# Patient Record
Sex: FEMALE | Race: BLACK OR AFRICAN AMERICAN | Employment: OTHER | URBAN - METROPOLITAN AREA
[De-identification: names, ages, dates, MRNs, and addresses within clinical notes are randomized per-mention and may not be internally consistent; named-entity substitution may affect disease eponyms.]

---

## 2018-01-01 ENCOUNTER — APPOINTMENT (OUTPATIENT)
Dept: GENERAL RADIOLOGY | Age: 83
End: 2018-01-01
Payer: MEDICARE

## 2018-01-01 ENCOUNTER — HOSPITAL ENCOUNTER (OUTPATIENT)
Dept: CT IMAGING | Age: 83
Discharge: HOME | End: 2018-12-21
Payer: MEDICARE

## 2018-01-01 ENCOUNTER — HOSPITAL ENCOUNTER (OUTPATIENT)
Age: 83
Discharge: HOME | End: 2018-12-19
Payer: MEDICARE

## 2018-01-01 ENCOUNTER — OFFICE VISIT (OUTPATIENT)
Dept: HEMATOLOGY | Age: 83
End: 2018-01-01
Payer: MEDICARE

## 2018-01-01 ENCOUNTER — TELEPHONE (OUTPATIENT)
Dept: HEMATOLOGY | Age: 83
End: 2018-01-01

## 2018-01-01 ENCOUNTER — HOSPITAL ENCOUNTER (OUTPATIENT)
Dept: GENERAL RADIOLOGY | Age: 83
Discharge: HOME | End: 2018-12-16
Payer: MEDICARE

## 2018-01-01 ENCOUNTER — HOSPITAL ENCOUNTER (OUTPATIENT)
Age: 83
Discharge: HOME | End: 2018-12-16
Payer: MEDICARE

## 2018-01-01 ENCOUNTER — APPOINTMENT (OUTPATIENT)
Dept: INTERVENTIONAL RADIOLOGY/VASCULAR | Age: 83
End: 2018-01-01
Payer: MEDICARE

## 2018-01-01 ENCOUNTER — HOSPITAL ENCOUNTER (OUTPATIENT)
Age: 83
Setting detail: OBSERVATION
Discharge: HOME | End: 2018-11-24
Attending: EMERGENCY MEDICINE | Admitting: INTERNAL MEDICINE
Payer: MEDICARE

## 2018-01-01 VITALS
BODY MASS INDEX: 39.86 KG/M2 | WEIGHT: 248 LBS | TEMPERATURE: 97.5 F | SYSTOLIC BLOOD PRESSURE: 112 MMHG | HEART RATE: 98 BPM | DIASTOLIC BLOOD PRESSURE: 67 MMHG | HEIGHT: 66 IN | RESPIRATION RATE: 16 BRPM | OXYGEN SATURATION: 95 %

## 2018-01-01 VITALS
OXYGEN SATURATION: 95 % | HEART RATE: 116 BPM | SYSTOLIC BLOOD PRESSURE: 129 MMHG | HEIGHT: 66 IN | DIASTOLIC BLOOD PRESSURE: 84 MMHG | WEIGHT: 244 LBS | RESPIRATION RATE: 20 BRPM | BODY MASS INDEX: 39.21 KG/M2

## 2018-01-01 DIAGNOSIS — J90 PLEURAL EFFUSION: Primary | ICD-10-CM

## 2018-01-01 DIAGNOSIS — K31.89 GASTRIC MASS: ICD-10-CM

## 2018-01-01 DIAGNOSIS — R16.0 LIVER MASS: Primary | ICD-10-CM

## 2018-01-01 DIAGNOSIS — J90 PLEURAL EFFUSION: ICD-10-CM

## 2018-01-01 DIAGNOSIS — R16.0 LIVER MASS: ICD-10-CM

## 2018-01-01 LAB
ALBUMIN SERPL-MCNC: 3.7 G/DL (ref 3.5–5.2)
ALP BLD-CCNC: 88 U/L (ref 35–104)
ALT SERPL-CCNC: 17 U/L (ref 0–32)
AMYLASE FLUID: 24 U/L
ANION GAP SERPL CALCULATED.3IONS-SCNC: 11 MMOL/L (ref 7–16)
ANION GAP SERPL CALCULATED.3IONS-SCNC: 13 MMOL/L (ref 7–16)
ANION GAP SERPL CALCULATED.3IONS-SCNC: 15 MMOL/L (ref 7–16)
APPEARANCE FLUID: NORMAL
APTT: 35.9 SEC (ref 24.5–35.1)
AST SERPL-CCNC: 16 U/L (ref 0–31)
BASOPHILS ABSOLUTE: 0.05 E9/L (ref 0–0.2)
BASOPHILS ABSOLUTE: 0.05 E9/L (ref 0–0.2)
BASOPHILS RELATIVE PERCENT: 0.4 % (ref 0–2)
BASOPHILS RELATIVE PERCENT: 0.5 % (ref 0–2)
BILIRUB SERPL-MCNC: 0.2 MG/DL (ref 0–1.2)
BODY FLUID CULTURE, STERILE: NORMAL
BUN BLDV-MCNC: 13 MG/DL (ref 8–23)
BUN BLDV-MCNC: 14 MG/DL (ref 8–23)
BUN BLDV-MCNC: 16 MG/DL (ref 8–23)
CALCIUM SERPL-MCNC: 9.2 MG/DL (ref 8.6–10.2)
CALCIUM SERPL-MCNC: 9.6 MG/DL (ref 8.6–10.2)
CALCIUM SERPL-MCNC: 9.7 MG/DL (ref 8.6–10.2)
CEA,FLUID: 621.5 NG/ML
CEA: 5.1 NG/ML (ref 0–5.2)
CELL COUNT FLUID TYPE: NORMAL
CHLORIDE BLD-SCNC: 100 MMOL/L (ref 98–107)
CHLORIDE BLD-SCNC: 103 MMOL/L (ref 98–107)
CHLORIDE BLD-SCNC: 95 MMOL/L (ref 98–107)
CO2: 23 MMOL/L (ref 22–29)
CO2: 25 MMOL/L (ref 22–29)
CO2: 25 MMOL/L (ref 22–29)
COLOR FLUID: YELLOW
CREAT SERPL-MCNC: 0.7 MG/DL (ref 0.5–1)
EKG ATRIAL RATE: 115 BPM
EKG ATRIAL RATE: 241 BPM
EKG P AXIS: 54 DEGREES
EKG Q-T INTERVAL: 288 MS
EKG Q-T INTERVAL: 434 MS
EKG QRS DURATION: 82 MS
EKG QRS DURATION: 82 MS
EKG QTC CALCULATION (BAZETT): 412 MS
EKG QTC CALCULATION (BAZETT): 613 MS
EKG R AXIS: 18 DEGREES
EKG R AXIS: 6 DEGREES
EKG T AXIS: -27 DEGREES
EKG T AXIS: -85 DEGREES
EKG VENTRICULAR RATE: 120 BPM
EKG VENTRICULAR RATE: 123 BPM
EOSINOPHILS ABSOLUTE: 0.08 E9/L (ref 0.05–0.5)
EOSINOPHILS ABSOLUTE: 0.19 E9/L (ref 0.05–0.5)
EOSINOPHILS RELATIVE PERCENT: 0.7 % (ref 0–6)
EOSINOPHILS RELATIVE PERCENT: 1.7 % (ref 0–6)
FLUID TYPE: NORMAL
FLUID TYPE: NORMAL
FUNGUS (MYCOLOGY) CULTURE: NORMAL
FUNGUS STAIN: NORMAL
GFR AFRICAN AMERICAN: >60
GFR NON-AFRICAN AMERICAN: >60 ML/MIN/1.73
GLUCOSE BLD-MCNC: 128 MG/DL (ref 74–99)
GLUCOSE BLD-MCNC: 158 MG/DL (ref 74–99)
GLUCOSE BLD-MCNC: 186 MG/DL (ref 74–99)
GLUCOSE, FLUID: 130 MG/DL
GRAM STAIN RESULT: NORMAL
HBA1C MFR BLD: 6.3 % (ref 4–5.6)
HCT VFR BLD CALC: 39.8 % (ref 34–48)
HCT VFR BLD CALC: 41.9 % (ref 34–48)
HEMOGLOBIN: 13.1 G/DL (ref 11.5–15.5)
HEMOGLOBIN: 14 G/DL (ref 11.5–15.5)
IMMATURE GRANULOCYTES #: 0.04 E9/L
IMMATURE GRANULOCYTES #: 0.06 E9/L
IMMATURE GRANULOCYTES %: 0.4 % (ref 0–5)
IMMATURE GRANULOCYTES %: 0.6 % (ref 0–5)
INR BLD: 1.1
LD, FLUID: 163 U/L
LV EF: 60 %
LVEF MODALITY: NORMAL
LYMPHOCYTES ABSOLUTE: 1.92 E9/L (ref 1.5–4)
LYMPHOCYTES ABSOLUTE: 2.19 E9/L (ref 1.5–4)
LYMPHOCYTES RELATIVE PERCENT: 17.6 % (ref 20–42)
LYMPHOCYTES RELATIVE PERCENT: 19.7 % (ref 20–42)
MAGNESIUM: 2.3 MG/DL (ref 1.6–2.6)
MAGNESIUM: 2.5 MG/DL (ref 1.6–2.6)
MCH RBC QN AUTO: 28.1 PG (ref 26–35)
MCH RBC QN AUTO: 28.6 PG (ref 26–35)
MCHC RBC AUTO-ENTMCNC: 32.9 % (ref 32–34.5)
MCHC RBC AUTO-ENTMCNC: 33.4 % (ref 32–34.5)
MCV RBC AUTO: 85.4 FL (ref 80–99.9)
MCV RBC AUTO: 85.5 FL (ref 80–99.9)
METER GLUCOSE: 105 MG/DL (ref 74–99)
METER GLUCOSE: 116 MG/DL (ref 74–99)
METER GLUCOSE: 126 MG/DL (ref 74–99)
METER GLUCOSE: 128 MG/DL (ref 74–99)
METER GLUCOSE: 129 MG/DL (ref 74–99)
METER GLUCOSE: 147 MG/DL (ref 74–99)
METER GLUCOSE: 149 MG/DL (ref 74–99)
MONOCYTE, FLUID: 95 %
MONOCYTES ABSOLUTE: 0.67 E9/L (ref 0.1–0.95)
MONOCYTES ABSOLUTE: 0.8 E9/L (ref 0.1–0.95)
MONOCYTES RELATIVE PERCENT: 6.1 % (ref 2–12)
MONOCYTES RELATIVE PERCENT: 7.2 % (ref 2–12)
NEUTROPHIL, FLUID: 5 %
NEUTROPHILS ABSOLUTE: 7.85 E9/L (ref 1.8–7.3)
NEUTROPHILS ABSOLUTE: 8.12 E9/L (ref 1.8–7.3)
NEUTROPHILS RELATIVE PERCENT: 70.6 % (ref 43–80)
NEUTROPHILS RELATIVE PERCENT: 74.5 % (ref 43–80)
NUCLEATED CELLS FLUID: 2250 /UL
PDW BLD-RTO: 13.5 FL (ref 11.5–15)
PDW BLD-RTO: 13.7 FL (ref 11.5–15)
PLATELET # BLD: 532 E9/L (ref 130–450)
PLATELET # BLD: 545 E9/L (ref 130–450)
PMV BLD AUTO: 8.4 FL (ref 7–12)
PMV BLD AUTO: 8.7 FL (ref 7–12)
POTASSIUM REFLEX MAGNESIUM: 3.5 MMOL/L (ref 3.5–5)
POTASSIUM SERPL-SCNC: 3.9 MMOL/L (ref 3.5–5)
POTASSIUM SERPL-SCNC: 4.4 MMOL/L (ref 3.5–5)
PRO-BNP: 73 PG/ML (ref 0–450)
PROCALCITONIN: 0.06 NG/ML (ref 0–0.08)
PROTEIN FLUID: 4.5 G/DL
PROTHROMBIN TIME: 12.9 SEC (ref 9.3–12.4)
RBC # BLD: 4.66 E12/L (ref 3.5–5.5)
RBC # BLD: 4.9 E12/L (ref 3.5–5.5)
RBC FLUID: <2000 /UL
SODIUM BLD-SCNC: 131 MMOL/L (ref 132–146)
SODIUM BLD-SCNC: 139 MMOL/L (ref 132–146)
SODIUM BLD-SCNC: 140 MMOL/L (ref 132–146)
TOTAL PROTEIN: 7.6 G/DL (ref 6.4–8.3)
TRIGLYCERIDES FLUID: 30 MG/DL
TROPONIN: <0.01 NG/ML (ref 0–0.03)
WBC # BLD: 10.9 E9/L (ref 4.5–11.5)
WBC # BLD: 11.1 E9/L (ref 4.5–11.5)

## 2018-01-01 PROCEDURE — 4004F PT TOBACCO SCREEN RCVD TLK: CPT | Performed by: TRANSPLANT SURGERY

## 2018-01-01 PROCEDURE — 2580000003 HC RX 258: Performed by: INTERNAL MEDICINE

## 2018-01-01 PROCEDURE — 71045 X-RAY EXAM CHEST 1 VIEW: CPT

## 2018-01-01 PROCEDURE — 85610 PROTHROMBIN TIME: CPT

## 2018-01-01 PROCEDURE — 32555 ASPIRATE PLEURA W/ IMAGING: CPT

## 2018-01-01 PROCEDURE — 83615 LACTATE (LD) (LDH) ENZYME: CPT

## 2018-01-01 PROCEDURE — 87205 SMEAR GRAM STAIN: CPT

## 2018-01-01 PROCEDURE — 87102 FUNGUS ISOLATION CULTURE: CPT

## 2018-01-01 PROCEDURE — 82378 CARCINOEMBRYONIC ANTIGEN: CPT

## 2018-01-01 PROCEDURE — G0378 HOSPITAL OBSERVATION PER HR: HCPCS

## 2018-01-01 PROCEDURE — 74175 CTA ABDOMEN W/CONTRAST: CPT

## 2018-01-01 PROCEDURE — 80048 BASIC METABOLIC PNL TOTAL CA: CPT

## 2018-01-01 PROCEDURE — 82962 GLUCOSE BLOOD TEST: CPT

## 2018-01-01 PROCEDURE — 96374 THER/PROPH/DIAG INJ IV PUSH: CPT

## 2018-01-01 PROCEDURE — 83735 ASSAY OF MAGNESIUM: CPT

## 2018-01-01 PROCEDURE — 6360000002 HC RX W HCPCS: Performed by: INTERNAL MEDICINE

## 2018-01-01 PROCEDURE — 6370000000 HC RX 637 (ALT 250 FOR IP): Performed by: INTERNAL MEDICINE

## 2018-01-01 PROCEDURE — 85025 COMPLETE CBC W/AUTO DIFF WBC: CPT

## 2018-01-01 PROCEDURE — 99204 OFFICE O/P NEW MOD 45 MIN: CPT | Performed by: TRANSPLANT SURGERY

## 2018-01-01 PROCEDURE — G8484 FLU IMMUNIZE NO ADMIN: HCPCS | Performed by: TRANSPLANT SURGERY

## 2018-01-01 PROCEDURE — 96372 THER/PROPH/DIAG INJ SC/IM: CPT

## 2018-01-01 PROCEDURE — 1101F PT FALLS ASSESS-DOCD LE1/YR: CPT | Performed by: TRANSPLANT SURGERY

## 2018-01-01 PROCEDURE — 84145 PROCALCITONIN (PCT): CPT

## 2018-01-01 PROCEDURE — 71046 X-RAY EXAM CHEST 2 VIEWS: CPT

## 2018-01-01 PROCEDURE — 87070 CULTURE OTHR SPECIMN AEROBIC: CPT

## 2018-01-01 PROCEDURE — 2580000003 HC RX 258: Performed by: RADIOLOGY

## 2018-01-01 PROCEDURE — 6360000004 HC RX CONTRAST MEDICATION: Performed by: RADIOLOGY

## 2018-01-01 PROCEDURE — 84157 ASSAY OF PROTEIN OTHER: CPT

## 2018-01-01 PROCEDURE — 94761 N-INVAS EAR/PLS OXIMETRY MLT: CPT

## 2018-01-01 PROCEDURE — 84484 ASSAY OF TROPONIN QUANT: CPT

## 2018-01-01 PROCEDURE — 1123F ACP DISCUSS/DSCN MKR DOCD: CPT | Performed by: TRANSPLANT SURGERY

## 2018-01-01 PROCEDURE — 1090F PRES/ABSN URINE INCON ASSESS: CPT | Performed by: TRANSPLANT SURGERY

## 2018-01-01 PROCEDURE — 99285 EMERGENCY DEPT VISIT HI MDM: CPT

## 2018-01-01 PROCEDURE — 85730 THROMBOPLASTIN TIME PARTIAL: CPT

## 2018-01-01 PROCEDURE — 83036 HEMOGLOBIN GLYCOSYLATED A1C: CPT

## 2018-01-01 PROCEDURE — 82150 ASSAY OF AMYLASE: CPT

## 2018-01-01 PROCEDURE — G8427 DOCREV CUR MEDS BY ELIG CLIN: HCPCS | Performed by: TRANSPLANT SURGERY

## 2018-01-01 PROCEDURE — 84478 ASSAY OF TRIGLYCERIDES: CPT

## 2018-01-01 PROCEDURE — 6360000002 HC RX W HCPCS: Performed by: EMERGENCY MEDICINE

## 2018-01-01 PROCEDURE — 36415 COLL VENOUS BLD VENIPUNCTURE: CPT

## 2018-01-01 PROCEDURE — 89051 BODY FLUID CELL COUNT: CPT

## 2018-01-01 PROCEDURE — G8400 PT W/DXA NO RESULTS DOC: HCPCS | Performed by: TRANSPLANT SURGERY

## 2018-01-01 PROCEDURE — C1729 CATH, DRAINAGE: HCPCS

## 2018-01-01 PROCEDURE — 80053 COMPREHEN METABOLIC PANEL: CPT

## 2018-01-01 PROCEDURE — 4040F PNEUMOC VAC/ADMIN/RCVD: CPT | Performed by: TRANSPLANT SURGERY

## 2018-01-01 PROCEDURE — 83880 ASSAY OF NATRIURETIC PEPTIDE: CPT

## 2018-01-01 PROCEDURE — 93306 TTE W/DOPPLER COMPLETE: CPT

## 2018-01-01 PROCEDURE — 82947 ASSAY GLUCOSE BLOOD QUANT: CPT

## 2018-01-01 PROCEDURE — G8417 CALC BMI ABV UP PARAM F/U: HCPCS | Performed by: TRANSPLANT SURGERY

## 2018-01-01 RX ORDER — POLYETHYLENE GLYCOL 3350 17 G/17G
17 POWDER, FOR SOLUTION ORAL ONCE
Status: COMPLETED | OUTPATIENT
Start: 2018-01-01 | End: 2018-01-01

## 2018-01-01 RX ORDER — DEXTROSE MONOHYDRATE 50 MG/ML
100 INJECTION, SOLUTION INTRAVENOUS PRN
Status: DISCONTINUED | OUTPATIENT
Start: 2018-01-01 | End: 2018-01-01 | Stop reason: SDUPTHER

## 2018-01-01 RX ORDER — SODIUM CHLORIDE 0.9 % (FLUSH) 0.9 %
10 SYRINGE (ML) INJECTION EVERY 12 HOURS SCHEDULED
Status: DISCONTINUED | OUTPATIENT
Start: 2018-01-01 | End: 2018-01-01 | Stop reason: HOSPADM

## 2018-01-01 RX ORDER — METOPROLOL SUCCINATE 25 MG/1
25 TABLET, EXTENDED RELEASE ORAL DAILY
Status: DISCONTINUED | OUTPATIENT
Start: 2018-01-01 | End: 2018-01-01 | Stop reason: HOSPADM

## 2018-01-01 RX ORDER — DORZOLAMIDE HCL 20 MG/ML
1 SOLUTION/ DROPS OPHTHALMIC 2 TIMES DAILY
COMMUNITY

## 2018-01-01 RX ORDER — FUROSEMIDE 10 MG/ML
40 INJECTION INTRAMUSCULAR; INTRAVENOUS ONCE
Status: COMPLETED | OUTPATIENT
Start: 2018-01-01 | End: 2018-01-01

## 2018-01-01 RX ORDER — AMLODIPINE BESYLATE 2.5 MG/1
2.5 TABLET ORAL DAILY
Status: DISCONTINUED | OUTPATIENT
Start: 2018-01-01 | End: 2018-01-01 | Stop reason: HOSPADM

## 2018-01-01 RX ORDER — DEXTROSE MONOHYDRATE 25 G/50ML
12.5 INJECTION, SOLUTION INTRAVENOUS PRN
Status: DISCONTINUED | OUTPATIENT
Start: 2018-01-01 | End: 2018-01-01 | Stop reason: HOSPADM

## 2018-01-01 RX ORDER — LATANOPROST 50 UG/ML
1 SOLUTION/ DROPS OPHTHALMIC NIGHTLY
COMMUNITY

## 2018-01-01 RX ORDER — METOPROLOL SUCCINATE 25 MG/1
25 TABLET, EXTENDED RELEASE ORAL DAILY
COMMUNITY

## 2018-01-01 RX ORDER — TRIAMTERENE AND HYDROCHLOROTHIAZIDE 37.5; 25 MG/1; MG/1
1 TABLET ORAL DAILY
Status: ON HOLD | COMMUNITY
End: 2019-01-01 | Stop reason: HOSPADM

## 2018-01-01 RX ORDER — ACETAMINOPHEN 325 MG/1
650 TABLET ORAL EVERY 4 HOURS PRN
Status: DISCONTINUED | OUTPATIENT
Start: 2018-01-01 | End: 2018-01-01 | Stop reason: HOSPADM

## 2018-01-01 RX ORDER — NICOTINE POLACRILEX 4 MG
15 LOZENGE BUCCAL PRN
Status: DISCONTINUED | OUTPATIENT
Start: 2018-01-01 | End: 2018-01-01 | Stop reason: HOSPADM

## 2018-01-01 RX ORDER — DOCUSATE SODIUM 100 MG/1
100 CAPSULE, LIQUID FILLED ORAL DAILY
Status: DISCONTINUED | OUTPATIENT
Start: 2018-01-01 | End: 2018-01-01 | Stop reason: HOSPADM

## 2018-01-01 RX ORDER — DEXTROSE MONOHYDRATE 50 MG/ML
100 INJECTION, SOLUTION INTRAVENOUS PRN
Status: DISCONTINUED | OUTPATIENT
Start: 2018-01-01 | End: 2018-01-01 | Stop reason: HOSPADM

## 2018-01-01 RX ORDER — DORZOLAMIDE HCL 20 MG/ML
1 SOLUTION/ DROPS OPHTHALMIC 2 TIMES DAILY
Status: DISCONTINUED | OUTPATIENT
Start: 2018-01-01 | End: 2018-01-01 | Stop reason: HOSPADM

## 2018-01-01 RX ORDER — SODIUM CHLORIDE 0.9 % (FLUSH) 0.9 %
10 SYRINGE (ML) INJECTION PRN
Status: DISCONTINUED | OUTPATIENT
Start: 2018-01-01 | End: 2018-01-01 | Stop reason: HOSPADM

## 2018-01-01 RX ORDER — PREDNISONE 50 MG/1
TABLET ORAL
Qty: 10 TABLET | Refills: 0 | Status: ON HOLD | OUTPATIENT
Start: 2018-01-01 | End: 2019-01-01 | Stop reason: HOSPADM

## 2018-01-01 RX ORDER — ONDANSETRON 2 MG/ML
4 INJECTION INTRAMUSCULAR; INTRAVENOUS EVERY 6 HOURS PRN
Status: DISCONTINUED | OUTPATIENT
Start: 2018-01-01 | End: 2018-01-01 | Stop reason: HOSPADM

## 2018-01-01 RX ORDER — TRIAMTERENE AND HYDROCHLOROTHIAZIDE 37.5; 25 MG/1; MG/1
1 TABLET ORAL DAILY
Status: DISCONTINUED | OUTPATIENT
Start: 2018-01-01 | End: 2018-01-01 | Stop reason: HOSPADM

## 2018-01-01 RX ORDER — LATANOPROST 50 UG/ML
1 SOLUTION/ DROPS OPHTHALMIC NIGHTLY
Status: DISCONTINUED | OUTPATIENT
Start: 2018-01-01 | End: 2018-01-01 | Stop reason: HOSPADM

## 2018-01-01 RX ORDER — SODIUM CHLORIDE 0.9 % (FLUSH) 0.9 %
10 SYRINGE (ML) INJECTION
Status: COMPLETED | OUTPATIENT
Start: 2018-01-01 | End: 2018-01-01

## 2018-01-01 RX ORDER — AMLODIPINE BESYLATE 2.5 MG/1
2.5 TABLET ORAL DAILY
COMMUNITY
End: 2019-01-01

## 2018-01-01 RX ORDER — NICOTINE POLACRILEX 4 MG
15 LOZENGE BUCCAL PRN
Status: DISCONTINUED | OUTPATIENT
Start: 2018-01-01 | End: 2018-01-01 | Stop reason: SDUPTHER

## 2018-01-01 RX ORDER — CETIRIZINE HYDROCHLORIDE 10 MG/1
5 TABLET ORAL NIGHTLY
Status: DISCONTINUED | OUTPATIENT
Start: 2018-01-01 | End: 2018-01-01 | Stop reason: HOSPADM

## 2018-01-01 RX ORDER — DEXTROSE MONOHYDRATE 25 G/50ML
12.5 INJECTION, SOLUTION INTRAVENOUS PRN
Status: DISCONTINUED | OUTPATIENT
Start: 2018-01-01 | End: 2018-01-01 | Stop reason: SDUPTHER

## 2018-01-01 RX ADMIN — CETIRIZINE HYDROCHLORIDE 5 MG: 10 TABLET, FILM COATED ORAL at 20:03

## 2018-01-01 RX ADMIN — Medication 10 ML: at 08:38

## 2018-01-01 RX ADMIN — LATANOPROST 1 DROP: 50 SOLUTION/ DROPS OPHTHALMIC at 21:21

## 2018-01-01 RX ADMIN — DOCUSATE SODIUM 100 MG: 100 CAPSULE, LIQUID FILLED ORAL at 17:03

## 2018-01-01 RX ADMIN — Medication 10 ML: at 09:11

## 2018-01-01 RX ADMIN — AMLODIPINE BESYLATE 2.5 MG: 2.5 TABLET ORAL at 09:11

## 2018-01-01 RX ADMIN — Medication 10 ML: at 10:31

## 2018-01-01 RX ADMIN — DOCUSATE SODIUM 100 MG: 100 CAPSULE, LIQUID FILLED ORAL at 08:39

## 2018-01-01 RX ADMIN — ENOXAPARIN SODIUM 40 MG: 40 INJECTION SUBCUTANEOUS at 08:39

## 2018-01-01 RX ADMIN — TRIAMTERENE AND HYDROCHLOROTHIAZIDE 1 TABLET: 37.5; 25 TABLET ORAL at 09:11

## 2018-01-01 RX ADMIN — DORZOLAMIDE HYDROCHLORIDE 1 DROP: 20 SOLUTION/ DROPS OPHTHALMIC at 21:21

## 2018-01-01 RX ADMIN — DORZOLAMIDE HYDROCHLORIDE 1 DROP: 20 SOLUTION/ DROPS OPHTHALMIC at 09:11

## 2018-01-01 RX ADMIN — AMLODIPINE BESYLATE 2.5 MG: 2.5 TABLET ORAL at 08:39

## 2018-01-01 RX ADMIN — IOHEXOL 50 ML: 240 INJECTION, SOLUTION INTRATHECAL; INTRAVASCULAR; INTRAVENOUS; ORAL at 10:31

## 2018-01-01 RX ADMIN — LATANOPROST 1 DROP: 50 SOLUTION/ DROPS OPHTHALMIC at 20:03

## 2018-01-01 RX ADMIN — DORZOLAMIDE HYDROCHLORIDE 1 DROP: 20 SOLUTION/ DROPS OPHTHALMIC at 08:40

## 2018-01-01 RX ADMIN — POLYETHYLENE GLYCOL 3350 17 G: 17 POWDER, FOR SOLUTION ORAL at 17:03

## 2018-01-01 RX ADMIN — Medication 10 ML: at 21:21

## 2018-01-01 RX ADMIN — METOPROLOL SUCCINATE 25 MG: 25 TABLET, FILM COATED, EXTENDED RELEASE ORAL at 08:39

## 2018-01-01 RX ADMIN — ENOXAPARIN SODIUM 40 MG: 40 INJECTION SUBCUTANEOUS at 18:25

## 2018-01-01 RX ADMIN — FUROSEMIDE 40 MG: 10 INJECTION, SOLUTION INTRAMUSCULAR; INTRAVENOUS at 13:58

## 2018-01-01 RX ADMIN — TRIAMTERENE AND HYDROCHLOROTHIAZIDE 1 TABLET: 37.5; 25 TABLET ORAL at 08:38

## 2018-01-01 RX ADMIN — METOPROLOL SUCCINATE 25 MG: 25 TABLET, FILM COATED, EXTENDED RELEASE ORAL at 09:11

## 2018-01-01 RX ADMIN — DORZOLAMIDE HYDROCHLORIDE 1 DROP: 20 SOLUTION/ DROPS OPHTHALMIC at 20:03

## 2018-01-01 RX ADMIN — Medication 10 ML: at 20:31

## 2018-01-01 RX ADMIN — IOPAMIDOL 90 ML: 755 INJECTION, SOLUTION INTRAVENOUS at 10:31

## 2018-01-01 ASSESSMENT — ENCOUNTER SYMPTOMS
PHOTOPHOBIA: 0
CONSTIPATION: 0
EYE PAIN: 0
EYE DISCHARGE: 0
NAUSEA: 0
BACK PAIN: 0
VOMITING: 0
ABDOMINAL PAIN: 1
DIARRHEA: 0
BLOOD IN STOOL: 0
SHORTNESS OF BREATH: 0

## 2018-01-01 ASSESSMENT — PAIN SCALES - GENERAL
PAINLEVEL_OUTOF10: 0

## 2018-11-22 PROBLEM — J90 PLEURAL EFFUSION: Status: ACTIVE | Noted: 2018-01-01

## 2018-11-22 NOTE — ED PROVIDER NOTES
HPI:  11/22/18,   Time: 2:40 PM       Akua Ba is a 80 y.o. female presenting to the ED for shortness of breath. Patient reassured of breath for the past week. It is worth exertion when she lies flat. She's never had this before. Denies any associated pain. Denies any associated fever, sputum, chest pain, change in bowel or bladder, rash, nausea, vomiting, or any other symptoms. Review of Systems:   Pertinent positives and negatives are stated within HPI, all other systems reviewed and are negative.          --------------------------------------------- PAST HISTORY ---------------------------------------------  Past Medical History:  has a past medical history of Arthritis. Past Surgical History:  has a past surgical history that includes joint replacement. Social History:  reports that she has been smoking Cigarettes. She has been smoking about 1.00 pack per day. She does not have any smokeless tobacco history on file. She reports that she does not drink alcohol or use drugs. Family History: family history is not on file. The patients home medications have been reviewed. Allergies: Iv [iodides]        ---------------------------------------------------PHYSICAL EXAM--------------------------------------    Constitutional/General: Alert and oriented x3, well appearing, non toxic in NAD  Head: Normocephalic and atraumatic  Eyes: PERRL, EOMI, conjunctive normal, sclera non icteric  Mouth: Oropharynx clear, handling secretions, no trismus, no asymmetry of the posterior oropharynx or uvular edema  Neck: Supple, full ROM, non tender to palpation in the midline, no stridor, no crepitus, no meningeal signs  Respiratory: Diminished at the left, coarse on the right. Not in respiratory distress  Cardiovascular:  Regular tachycardia. Regular rhythm. No murmurs, gallops, or rubs. 2+ distal pulses  Chest: No chest wall tenderness  GI:  Abdomen Soft, Non tender, Non distended. +BS.  No organomegaly, no

## 2018-11-23 PROBLEM — I10 HTN (HYPERTENSION), BENIGN: Chronic | Status: ACTIVE | Noted: 2018-01-01

## 2018-11-23 PROBLEM — I49.3 PVC (PREMATURE VENTRICULAR CONTRACTION): Status: ACTIVE | Noted: 2018-01-01

## 2018-11-23 PROBLEM — R06.00 DYSPNEA: Status: ACTIVE | Noted: 2018-01-01

## 2018-11-23 PROBLEM — I10 ESSENTIAL HYPERTENSION: Status: ACTIVE | Noted: 2018-01-01

## 2018-11-23 PROBLEM — E66.01 MORBID OBESITY (HCC): Chronic | Status: ACTIVE | Noted: 2018-01-01

## 2018-11-23 PROBLEM — K59.00 CONSTIPATION: Status: ACTIVE | Noted: 2018-01-01

## 2018-11-23 PROBLEM — E11.9 DIABETES MELLITUS TYPE 2, CONTROLLED (HCC): Chronic | Status: ACTIVE | Noted: 2018-01-01

## 2018-11-23 NOTE — PROGRESS NOTES
Patient arrived awake for left thoracentesis, procedure explained by tech and Interventional Radiologist, questions answered, patient expresses understanding and consent signed. Pre procedure routine / checklist completed. Left post pleural area scanned, prepped and centesis catheter inserted left side with ultrasound guidance by Dr Quiuqe Marin  @ 21 [phone]. Patient tolerated well. 1.9 Liters drained of green/yellow colored pleural fluid. Centesis catheter removed @ 1903. Puncture site cleansed and dry dressing applied. No bleeding, swelling or complications noted. Chest x-ray post procedure completed, report called to C/Jessica Mchugh [id number] on floor @ 1301.

## 2018-11-23 NOTE — CARE COORDINATION
11/23/2018 social work transition of care  Pt was out of the room but dtr,Fanny ([phone]) was present. Patient is from home with dtr [facility] and had been independent,per dtr. Pt has hx of snf-[place] and [facility]-unable to remember provider. Patient pcp is Dr. Jose Leonard and pharmacy is [facility] on [address] [place] [address]. Explained sw role in transition of care,pt plan is home,family will transport. Sw will follow and assist prn.   Electronically signed by DICK Brown on 11/23/2018 at 1:05 PM

## 2018-11-23 NOTE — H&P
[address] [facility]  History and Physical      CHIEF COMPLAINT:  Short of breath    History of Present Illness: the patient is a 80 y.o.  [place] woman followed by DR Nai Garcia who presents secondary to shortness of breath. She reports feeling short of breath for several weeks. She initially had problems with constipation which she was attributing to use of metformin. She was using stool softeners with modest improvement. She then began noting increased dyspnea with exertion. No associated fevers or chills, but she has noted a sore throat. No nausea or vomiting. No chest pain. No new leg edema. She presented to the ER. Work up with imaging demonstrated a large left pleural effusion. She denies any past history of CHF. Her BNP was normal.  She was admitted for further care. Thoracentesis was completed a short time ago with success. She currently feels better and has no current distress or dyspnea. Past Medical History:   Diagnosis Date    Arthritis     Diabetes mellitus type 2, controlled   11/23/2018    HTN (hypertension), benign 11/23/2018         Past Surgical History:   Procedure Laterality Date    JOINT REPLACEMENT         Medications Prior to Admission:    Prescriptions Prior to Admission: amLODIPine (NORVASC) 2.5 MG tablet, Take 2.5 mg by mouth daily  metoprolol succinate (TOPROL XL) 25 MG extended release tablet, Take 25 mg by mouth daily  triamterene-hydrochlorothiazide (MAXZIDE-25) 37.5-25 MG per tablet, Take 1 tablet by mouth daily  metFORMIN (GLUCOPHAGE) 500 MG tablet, Take 500 mg by mouth daily (with breakfast)  latanoprost (XALATAN) 0.005 % ophthalmic solution, Place 1 drop into both eyes nightly  dorzolamide (TRUSOPT) 2 % ophthalmic solution, Place 1 drop into both eyes 2 times daily    Note that the patient's home medications were reviewed and the above list is accurate to the best of my knowledge at the time of the exam.    Allergies:     Iv [iodides]    Social History:    reports that she has been smoking Cigarettes. She has been smoking about 1.00 pack per day. She does not have any smokeless tobacco history on file. She reports that she does not drink alcohol or use drugs. Family History:   History is noncontributory due to advanced age and co-morbidities. REVIEW OF SYSTEMS:  As above in the HPI, otherwise negative    PHYSICAL EXAM:    Vitals:  /62   Pulse 94   Temp 97.7 °F (36.5 °C) (Oral)   Resp 16   Ht 5' 6\" (1.676 m)   Wt 249 lb 12.8 oz (113.3 kg)   SpO2 97%   BMI 40.32 kg/m²     General:  Awake, alert, oriented X 3. Well developed, well nourished, well groomed. No apparent distress. HEENT:  Normocephalic, atraumatic. No scleral icterus. No conjunctival injection. Normal lips, teeth, and gums. No nasal discharge. No pharyngeal erythema or exudate. Neck:  Supple  Heart:  RRR, no murmurs, gallops, or rubs  Lungs:  CTA bilaterally, bilat symmetrical expansion, no wheeze, rales, or rhonchi  Abdomen:   Bowel sounds present, soft, non distended, nontender, no masses, no organomegaly, no peritoneal signs  Extremities:  No clubbing, cyanosis, or edema  Skin:  Warm and dry, no open lesions or rash  Neuro:  Cranial nerves 2-12 intact, no focal deficits  Breast: deferred  Rectal: deferred  Genitalia:  deferred    LABS:  CBC:   Lab Results   Component Value Date    WBC 11.1 11/23/2018    RBC 4.66 11/23/2018    HGB 13.1 11/23/2018    HCT 39.8 11/23/2018    MCV 85.4 11/23/2018    MCH 28.1 11/23/2018    MCHC 32.9 11/23/2018    RDW 13.7 11/23/2018     11/23/2018    MPV 8.7 11/23/2018     BMP:    Lab Results   Component Value Date     11/23/2018    K 3.5 11/23/2018    CL 95 11/23/2018    CO2 25 11/23/2018    BUN 14 11/23/2018    LABALBU 3.7 11/22/2018    CREATININE 0.7 11/23/2018    CALCIUM 9.2 11/23/2018    GFRAA >60 11/23/2018    LABGLOM >60 11/23/2018    GLUCOSE 128 11/23/2018         ASSESSMENT:      Patient Active Problem List   Diagnosis    Pleural effusion-?etiology, improved post thoracentesis    HTN (hypertension), benign    Diabetes mellitus type 2, controlled      Constipation    Morbid obesity        PLAN:    1) admit to monitored bed observation  2) pulmonary consult (done)  3) await pleural fluid studies  4) check echo  5) resume home medications  6) stool softeners and laxatives for constipation  7) home once work up complete  8) the patient is expected to stay 2 or more midnights to evaluate and treat her pleural effusion      Please note that over 50 minutes was spent in evaluating the patient, review of records and results, discussion with staff/family, etc.    Anahy Encarnacion MD  2:43 PM  11/23/2018

## 2018-11-23 NOTE — PROGRESS NOTES
Dior Richardson M.D.,[facility]  Tavo Whiteside D.O., F.AMILCAR.OELMO., Zachery Spann M.D. Camryn Dutton M.D., Jeffrey Zuniga M.D. Daily Pulmonary Progress Note    Patient:  Rebekah Ballard 80 y.o. female MRN: [id number]     Date of Service: 11/23/2018      Synopsis     We are following patient for increasing dyspnea and large left pleural effusion    \"CC\" shortness of breath    Code status:  Subjective      Patient was seen and examined today lying in the bed. She states that she feels a little better today and is awaiting thoracentesis. She has a slight productive cough and is on room air. She complains of dyspnea on exertion. Review of Systems:  Constitutional: Denies fever, weight loss, night sweats, and fatigue  Skin: Denies pigmentation, dark lesions, and rashes   HEENT: Denies hearing loss, tinnitus, ear drainage, epistaxis, sore throat, and hoarseness. Cardiovascular: Denies palpitations, chest pain, and chest pressure. Respiratory: Denies cough, dyspnea at rest, hemoptysis, apnea, and choking.   Gastrointestinal: Denies nausea, vomiting, poor appetite, diarrhea, heartburn or reflux  Genitourinary: Denies dysuria, frequency, urgency or hematuria  Musculoskeletal: Denies myalgias, muscle weakness, and bone pain  Neurological: Denies dizziness, vertigo, headache, and focal weakness  Psychological: Denies anxiety and depression  Endocrine: Denies heat intolerance and cold intolerance  Hematopoietic/Lymphatic: Denies bleeding problems and blood transfusions    24-hour events:  No acute overnight events    Objective   Vitals: BP (!) 142/80   Pulse 87   Temp 97.2 °F (36.2 °C) (Temporal)   Resp 17   Ht 5' 6\" (1.676 m)   Wt 249 lb 12.8 oz (113.3 kg)   SpO2 94%   BMI 40.32 kg/m²     I/O:    Intake/Output Summary (Last 24 hours) at 11/23/18 0942  Last data filed at 11/23/18 0640   Gross per 24 hour   Intake              480 ml   Output              700 ml   Net             -220 ml results for input(s): CULTRESP in the last 72 hours. No results for input(s): LABGRAM in the last 72 hours. No results for input(s): LEGUR in the last 72 hours. No results for input(s): STREPNEUMAGU in the last 72 hours. No results for input(s): LP1UAG in the last 72 hours. Assessment:    1. Increasing dyspnea. 2.  Large left pleural effusion, cannot rule out underlying mass. 3.  History of breast cancer. Plan:   1. Thoracentesis today, follow fluids  2. CT chest without contrast after thoracentesis  3. procalcitonin pending  4. Echo pending      This plan of care was reviewed in collaboration with    Electronically signed by LIZBETH Thompson CNP on 11/23/2018 at 9:42 AM    I personally saw, examined, and cared for the patient. Labs, medications, radiographs reviewed. I agree with history exam and plans detailed in NP note with the following additions: For L thoracentesis today. Possible chest CT thereafter.   Pleural fluid analysis    Electronically signed by Tyler Arnold MD on 11/23/2018 at 2:40 PM

## 2018-11-23 NOTE — PLAN OF CARE
Problem: Serum Glucose Level - Abnormal:  Goal: Ability to maintain appropriate glucose levels will improve  Ability to maintain appropriate glucose levels will improve   Outcome: Ongoing      Problem: Gas Exchange - Impaired:  Goal: Levels of oxygenation will improve  Levels of oxygenation will improve   Outcome: Met This Shift      Problem: Breathing Pattern - Ineffective:  Goal: Ability to achieve and maintain a regular respiratory rate will improve  Ability to achieve and maintain a regular respiratory rate will improve  Outcome: Met This Shift

## 2018-11-24 NOTE — PROGRESS NOTES
CARDIOLOGY PROGRESS NOTE  [facility]        Subjective:  [address] cardiology seeing patient for shortness of breath, dyspnea on exertion, hypertension, diabetes on metformin 500 mg daily at home. Status post left thoracentesis and breathing better by her report. She is a long-term smoker and analysis pending. Eating lunch in chair at bedside this afternoon and appears to be comfortable in no distress. She denies any hemoptysis, chest pain or exertional chest symptoms. She has ambulated in the room without significant difficulty or shortness of breath by her report. Objective: Labs chart medications and telemetry all reviewed. Patient Vitals for the past 24 hrs:   BP Temp Temp src Pulse Resp SpO2 Weight   11/24/18 0758 112/67 97.5 °F (36.4 °C) Temporal 98 16 95 % -   11/24/18 0630 - - - - - - 248 lb (112.5 kg)   11/23/18 2315 (!) 120/58 97.2 °F (36.2 °C) Temporal 90 16 - -   11/23/18 1945 122/62 96.8 °F (36 °C) Temporal 86 16 - -   11/23/18 1500 (!) 114/59 96.5 °F (35.8 °C) Temporal 92 - 95 % -         Intake/Output Summary (Last 24 hours) at 11/24/18 1324  Last data filed at 11/24/18 1044   Gross per 24 hour   Intake              720 ml   Output              800 ml   Net              -80 ml       Wt Readings from Last 3 Encounters:   11/24/18 248 lb (112.5 kg)       Telemetry:  I  personally reviewed and shows normal sinus rhythm heart rate of 90.     Current meds: Scheduled Meds:   insulin lispro  0-6 Units Subcutaneous TID     insulin lispro  0-3 Units Subcutaneous Nightly    cetirizine  5 mg Oral Nightly    docusate sodium  100 mg Oral Daily    amLODIPine  2.5 mg Oral Daily    dorzolamide  1 drop Both Eyes BID    latanoprost  1 drop Both Eyes Nightly    metoprolol succinate  25 mg Oral Daily    triamterene-hydrochlorothiazide  1 tablet Oral Daily    sodium chloride flush  10 mL Intravenous 2 times per day    enoxaparin  40 mg Subcutaneous Daily     Continuous Infusions:  

## 2018-12-17 NOTE — PROGRESS NOTES
use No    Drug use: No    Sexual activity: Not on file     Other Topics Concern    Not on file     Social History Narrative    No narrative on file       ROS:   Review of Systems   Constitutional: Negative for chills, diaphoresis and fever. HENT: Negative for congestion, ear discharge, ear pain, hearing loss, nosebleeds and tinnitus. Eyes: Negative for photophobia, pain and discharge. Respiratory: Negative for shortness of breath. Cardiovascular: Negative for palpitations and leg swelling. Gastrointestinal: Positive for abdominal pain. Negative for blood in stool, constipation, diarrhea, nausea and vomiting. Endocrine: Negative for polydipsia. Genitourinary: Negative for frequency, hematuria and urgency. Musculoskeletal: Negative for back pain and neck pain. Skin: Negative for rash. Allergic/Immunologic: Negative for environmental allergies. Neurological: Negative for tremors and seizures. Psychiatric/Behavioral: Negative for hallucinations and suicidal ideas. The patient is not nervous/anxious. Physical Exam:  Vitals:    12/14/18 1151   BP: 129/84   Pulse: 116   Resp: 20   SpO2: 95%       PSYCH: mood and affect normal, alert and oriented x 3  CONSTITUTIONAL: No apparent distress, comfortable  EYES: Sclera white, pupils equal round and reactive to light  ENMT:  Hearing normal, trachea midline, ears externally intact  LYMPH: no lympadenopathy in neck. Nolympadenopathy in groins  RESP: Breath sounds were clear and equal with no rales, wheezes, or rhonchi. Respiratory effort was normal with no retractions or use of accessory muscles. CV: Heart soundswere normal with a regular rate and rhythm. No pedal edema  GI/ Abdomen: soft, nondistended, nontender,no guarding, no peritoneal signs.     MSK: no clubbing/ no cyanosis/ gaitnormal       Assessment/Plan:  9cm Gastric mass - possible liver mass  - we discussed her previous surgery's along with there previous CT scan  - will plan for a triphasic CT scan with oral contrast and a steroid prep  - will plan for an EGD  - I am wondering if the mass we are seeing is her previous VBG - she may require an Endoscopic ultrasound  - she is in agreement with this plan    45 Minutes of which greater than 50% was spent counseling or coordinating her care. Thank you for the consultation allowing me to take part in Ms. Shelby' care. Please send a copy of my note to Dr. Frankie Solomon.  Rosangela Alaniz M.D.  12/17/2018  11:06 AM

## 2019-01-01 ENCOUNTER — ANESTHESIA (OUTPATIENT)
Dept: OPERATING ROOM | Age: 84
DRG: 982 | End: 2019-01-01
Payer: MEDICARE

## 2019-01-01 ENCOUNTER — HOSPITAL ENCOUNTER (OUTPATIENT)
Dept: PREADMISSION TESTING | Age: 84
Discharge: HOME | End: 2019-02-05
Payer: MEDICARE

## 2019-01-01 ENCOUNTER — APPOINTMENT (OUTPATIENT)
Dept: GENERAL RADIOLOGY | Age: 84
DRG: 982 | End: 2019-01-01
Attending: TRANSPLANT SURGERY
Payer: MEDICARE

## 2019-01-01 ENCOUNTER — OFFICE VISIT (OUTPATIENT)
Dept: HEMATOLOGY | Age: 84
End: 2019-01-01
Payer: MEDICARE

## 2019-01-01 ENCOUNTER — HOSPITAL ENCOUNTER (INPATIENT)
Age: 84
LOS: 3 days | Discharge: HOSPICE | DRG: 180 | End: 2019-06-01
Attending: EMERGENCY MEDICINE | Admitting: INTERNAL MEDICINE
Payer: MEDICARE

## 2019-01-01 ENCOUNTER — APPOINTMENT (OUTPATIENT)
Dept: GENERAL RADIOLOGY | Age: 84
DRG: 193 | End: 2019-01-01
Payer: MEDICARE

## 2019-01-01 ENCOUNTER — HOSPITAL ENCOUNTER (OUTPATIENT)
Age: 84
Setting detail: OUTPATIENT SURGERY
Discharge: HOME | End: 2019-01-21
Attending: TRANSPLANT SURGERY | Admitting: TRANSPLANT SURGERY
Payer: MEDICARE

## 2019-01-01 ENCOUNTER — TELEPHONE (OUTPATIENT)
Dept: HEMATOLOGY | Age: 84
End: 2019-01-01

## 2019-01-01 ENCOUNTER — ANESTHESIA EVENT (OUTPATIENT)
Dept: ENDOSCOPY | Age: 84
End: 2019-01-01
Payer: MEDICARE

## 2019-01-01 ENCOUNTER — APPOINTMENT (OUTPATIENT)
Dept: GENERAL RADIOLOGY | Age: 84
DRG: 180 | End: 2019-01-01
Payer: MEDICARE

## 2019-01-01 ENCOUNTER — HOSPITAL ENCOUNTER (OUTPATIENT)
Dept: CT IMAGING | Age: 84
Discharge: HOME | End: 2019-03-17
Payer: MEDICARE

## 2019-01-01 ENCOUNTER — HOSPITAL ENCOUNTER (INPATIENT)
Age: 84
LOS: 3 days | Discharge: HOME | DRG: 193 | End: 2019-05-24
Attending: EMERGENCY MEDICINE | Admitting: INTERNAL MEDICINE
Payer: MEDICARE

## 2019-01-01 ENCOUNTER — ANESTHESIA (OUTPATIENT)
Dept: ENDOSCOPY | Age: 84
End: 2019-01-01
Payer: MEDICARE

## 2019-01-01 ENCOUNTER — ANESTHESIA EVENT (OUTPATIENT)
Dept: OPERATING ROOM | Age: 84
DRG: 982 | End: 2019-01-01
Payer: MEDICARE

## 2019-01-01 ENCOUNTER — APPOINTMENT (OUTPATIENT)
Dept: GENERAL RADIOLOGY | Age: 84
End: 2019-01-01
Payer: MEDICARE

## 2019-01-01 ENCOUNTER — HOSPITAL ENCOUNTER (EMERGENCY)
Age: 84
Discharge: HOME | End: 2019-03-29
Attending: EMERGENCY MEDICINE
Payer: MEDICARE

## 2019-01-01 ENCOUNTER — APPOINTMENT (OUTPATIENT)
Dept: CT IMAGING | Age: 84
DRG: 180 | End: 2019-01-01
Payer: MEDICARE

## 2019-01-01 ENCOUNTER — HOSPITAL ENCOUNTER (INPATIENT)
Age: 84
LOS: 8 days | Discharge: HOME HEALTH | DRG: 982 | End: 2019-02-20
Attending: TRANSPLANT SURGERY | Admitting: TRANSPLANT SURGERY
Payer: MEDICARE

## 2019-01-01 ENCOUNTER — APPOINTMENT (OUTPATIENT)
Dept: CT IMAGING | Age: 84
DRG: 982 | End: 2019-01-01
Attending: TRANSPLANT SURGERY
Payer: MEDICARE

## 2019-01-01 ENCOUNTER — APPOINTMENT (OUTPATIENT)
Dept: INTERVENTIONAL RADIOLOGY/VASCULAR | Age: 84
DRG: 982 | End: 2019-01-01
Attending: TRANSPLANT SURGERY
Payer: MEDICARE

## 2019-01-01 VITALS
BODY MASS INDEX: 39.05 KG/M2 | HEIGHT: 66 IN | RESPIRATION RATE: 20 BRPM | SYSTOLIC BLOOD PRESSURE: 125 MMHG | OXYGEN SATURATION: 95 % | DIASTOLIC BLOOD PRESSURE: 81 MMHG | WEIGHT: 243 LBS | HEART RATE: 115 BPM

## 2019-01-01 VITALS
OXYGEN SATURATION: 99 % | WEIGHT: 238.2 LBS | DIASTOLIC BLOOD PRESSURE: 56 MMHG | HEART RATE: 88 BPM | RESPIRATION RATE: 22 BRPM | TEMPERATURE: 97.2 F | HEIGHT: 66 IN | SYSTOLIC BLOOD PRESSURE: 122 MMHG | BODY MASS INDEX: 38.28 KG/M2

## 2019-01-01 VITALS
WEIGHT: 243 LBS | OXYGEN SATURATION: 96 % | BODY MASS INDEX: 39.05 KG/M2 | TEMPERATURE: 96.8 F | RESPIRATION RATE: 16 BRPM | DIASTOLIC BLOOD PRESSURE: 58 MMHG | HEIGHT: 66 IN | HEART RATE: 83 BPM | SYSTOLIC BLOOD PRESSURE: 112 MMHG

## 2019-01-01 VITALS
DIASTOLIC BLOOD PRESSURE: 79 MMHG | BODY MASS INDEX: 39.05 KG/M2 | RESPIRATION RATE: 18 BRPM | HEIGHT: 66 IN | WEIGHT: 243 LBS | HEART RATE: 96 BPM | SYSTOLIC BLOOD PRESSURE: 133 MMHG

## 2019-01-01 VITALS
BODY MASS INDEX: 37.59 KG/M2 | SYSTOLIC BLOOD PRESSURE: 138 MMHG | HEART RATE: 98 BPM | DIASTOLIC BLOOD PRESSURE: 75 MMHG | HEIGHT: 66 IN | TEMPERATURE: 97.3 F | OXYGEN SATURATION: 96 % | WEIGHT: 233.9 LBS

## 2019-01-01 VITALS
SYSTOLIC BLOOD PRESSURE: 109 MMHG | WEIGHT: 225.2 LBS | BODY MASS INDEX: 38.45 KG/M2 | TEMPERATURE: 98.4 F | RESPIRATION RATE: 18 BRPM | DIASTOLIC BLOOD PRESSURE: 61 MMHG | HEART RATE: 100 BPM | HEIGHT: 64 IN | OXYGEN SATURATION: 95 %

## 2019-01-01 VITALS
WEIGHT: 230 LBS | TEMPERATURE: 97.6 F | HEIGHT: 63 IN | HEART RATE: 88 BPM | BODY MASS INDEX: 40.75 KG/M2 | SYSTOLIC BLOOD PRESSURE: 133 MMHG | OXYGEN SATURATION: 95 % | RESPIRATION RATE: 18 BRPM | DIASTOLIC BLOOD PRESSURE: 69 MMHG

## 2019-01-01 VITALS
DIASTOLIC BLOOD PRESSURE: 74 MMHG | TEMPERATURE: 97.5 F | HEIGHT: 60 IN | BODY MASS INDEX: 45.57 KG/M2 | WEIGHT: 232.1 LBS | HEART RATE: 89 BPM | SYSTOLIC BLOOD PRESSURE: 120 MMHG | OXYGEN SATURATION: 100 %

## 2019-01-01 VITALS — SYSTOLIC BLOOD PRESSURE: 100 MMHG | OXYGEN SATURATION: 93 % | DIASTOLIC BLOOD PRESSURE: 58 MMHG

## 2019-01-01 VITALS
OXYGEN SATURATION: 96 % | HEIGHT: 66 IN | HEART RATE: 98 BPM | TEMPERATURE: 98.4 F | BODY MASS INDEX: 37.28 KG/M2 | DIASTOLIC BLOOD PRESSURE: 69 MMHG | WEIGHT: 232 LBS | RESPIRATION RATE: 20 BRPM | SYSTOLIC BLOOD PRESSURE: 133 MMHG

## 2019-01-01 VITALS
WEIGHT: 228 LBS | HEIGHT: 64 IN | SYSTOLIC BLOOD PRESSURE: 123 MMHG | OXYGEN SATURATION: 96 % | RESPIRATION RATE: 18 BRPM | BODY MASS INDEX: 38.93 KG/M2 | DIASTOLIC BLOOD PRESSURE: 75 MMHG | HEART RATE: 93 BPM | TEMPERATURE: 97.7 F

## 2019-01-01 VITALS — TEMPERATURE: 96.1 F | OXYGEN SATURATION: 96 %

## 2019-01-01 DIAGNOSIS — C49.A2 GASTROINTESTINAL STROMAL TUMOR (GIST) OF STOMACH (HCC): Primary | ICD-10-CM

## 2019-01-01 DIAGNOSIS — G89.3 CANCER ASSOCIATED PAIN: ICD-10-CM

## 2019-01-01 DIAGNOSIS — R06.02 SOB (SHORTNESS OF BREATH): ICD-10-CM

## 2019-01-01 DIAGNOSIS — K31.89 MASS OF STOMACH: ICD-10-CM

## 2019-01-01 DIAGNOSIS — Z01.812 PRE-OPERATIVE LABORATORY EXAMINATION: Primary | ICD-10-CM

## 2019-01-01 DIAGNOSIS — C49.A0 GASTROINTESTINAL STROMAL TUMOR (GIST) (HCC): Primary | ICD-10-CM

## 2019-01-01 DIAGNOSIS — J18.9 PNEUMONIA DUE TO ORGANISM: Primary | ICD-10-CM

## 2019-01-01 DIAGNOSIS — G89.18 POST-OP PAIN: ICD-10-CM

## 2019-01-01 DIAGNOSIS — Z51.5 PALLIATIVE CARE ENCOUNTER: ICD-10-CM

## 2019-01-01 DIAGNOSIS — C79.9 METASTATIC DISEASE (HCC): ICD-10-CM

## 2019-01-01 DIAGNOSIS — K31.89 GASTRIC MASS: ICD-10-CM

## 2019-01-01 DIAGNOSIS — R10.12 LEFT UPPER QUADRANT ABDOMINAL PAIN OF UNKNOWN ETIOLOGY: ICD-10-CM

## 2019-01-01 DIAGNOSIS — Z01.812 PRE-OPERATIVE LABORATORY EXAMINATION: ICD-10-CM

## 2019-01-01 DIAGNOSIS — C34.10 MALIGNANT NEOPLASM OF UPPER LOBE OF LUNG, UNSPECIFIED LATERALITY (HCC): ICD-10-CM

## 2019-01-01 DIAGNOSIS — I50.9 CONGESTIVE HEART FAILURE, UNSPECIFIED HF CHRONICITY, UNSPECIFIED HEART FAILURE TYPE (HCC): Primary | ICD-10-CM

## 2019-01-01 DIAGNOSIS — J18.9 COMMUNITY ACQUIRED PNEUMONIA OF LEFT LOWER LOBE OF LUNG: ICD-10-CM

## 2019-01-01 LAB
ABO/RH: NORMAL
ALBUMIN SERPL-MCNC: 2.3 G/DL (ref 3.5–5.2)
ALBUMIN SERPL-MCNC: 2.4 G/DL (ref 3.5–5.2)
ALBUMIN SERPL-MCNC: 2.6 G/DL (ref 3.5–5.2)
ALBUMIN SERPL-MCNC: 2.6 G/DL (ref 3.5–5.2)
ALBUMIN SERPL-MCNC: 2.7 G/DL (ref 3.5–5.2)
ALBUMIN SERPL-MCNC: 2.9 G/DL (ref 3.5–5.2)
ALBUMIN SERPL-MCNC: 3 G/DL (ref 3.5–5.2)
ALBUMIN SERPL-MCNC: 3.7 G/DL (ref 3.5–5.2)
ALP BLD-CCNC: 142 U/L (ref 35–104)
ALP BLD-CCNC: 312 U/L (ref 35–104)
ALP BLD-CCNC: 61 U/L (ref 35–104)
ALP BLD-CCNC: 63 U/L (ref 35–104)
ALP BLD-CCNC: 65 U/L (ref 35–104)
ALP BLD-CCNC: 65 U/L (ref 35–104)
ALP BLD-CCNC: 68 U/L (ref 35–104)
ALP BLD-CCNC: 74 U/L (ref 35–104)
ALP BLD-CCNC: 78 U/L (ref 35–104)
ALP BLD-CCNC: 78 U/L (ref 35–104)
ALP BLD-CCNC: 79 U/L (ref 35–104)
ALT SERPL-CCNC: 13 U/L (ref 0–32)
ALT SERPL-CCNC: 14 U/L (ref 0–32)
ALT SERPL-CCNC: 21 U/L (ref 0–32)
ALT SERPL-CCNC: 21 U/L (ref 0–32)
ALT SERPL-CCNC: 8 U/L (ref 0–32)
ALT SERPL-CCNC: 9 U/L (ref 0–32)
AMORPHOUS: ABNORMAL
ANION GAP SERPL CALCULATED.3IONS-SCNC: 10 MMOL/L (ref 7–16)
ANION GAP SERPL CALCULATED.3IONS-SCNC: 10 MMOL/L (ref 7–16)
ANION GAP SERPL CALCULATED.3IONS-SCNC: 11 MMOL/L (ref 7–16)
ANION GAP SERPL CALCULATED.3IONS-SCNC: 12 MMOL/L (ref 7–16)
ANION GAP SERPL CALCULATED.3IONS-SCNC: 14 MMOL/L (ref 7–16)
ANION GAP SERPL CALCULATED.3IONS-SCNC: 14 MMOL/L (ref 7–16)
ANION GAP SERPL CALCULATED.3IONS-SCNC: 15 MMOL/L (ref 7–16)
ANION GAP SERPL CALCULATED.3IONS-SCNC: 7 MMOL/L (ref 7–16)
ANION GAP SERPL CALCULATED.3IONS-SCNC: 7 MMOL/L (ref 7–16)
ANION GAP SERPL CALCULATED.3IONS-SCNC: 9 MMOL/L (ref 7–16)
ANISOCYTOSIS: ABNORMAL
ANTIBODY SCREEN: NORMAL
APTT: 29.2 SEC (ref 24.5–35.1)
APTT: 31.7 SEC (ref 24.5–35.1)
AST SERPL-CCNC: 12 U/L (ref 0–31)
AST SERPL-CCNC: 14 U/L (ref 0–31)
AST SERPL-CCNC: 15 U/L (ref 0–31)
AST SERPL-CCNC: 16 U/L (ref 0–31)
AST SERPL-CCNC: 17 U/L (ref 0–31)
AST SERPL-CCNC: 17 U/L (ref 0–31)
AST SERPL-CCNC: 18 U/L (ref 0–31)
AST SERPL-CCNC: 19 U/L (ref 0–31)
AST SERPL-CCNC: 19 U/L (ref 0–31)
AST SERPL-CCNC: 44 U/L (ref 0–31)
AST SERPL-CCNC: 49 U/L (ref 0–31)
BACTERIA: ABNORMAL /HPF
BASOPHILS ABSOLUTE: 0 E9/L (ref 0–0.2)
BASOPHILS ABSOLUTE: 0.01 E9/L (ref 0–0.2)
BASOPHILS ABSOLUTE: 0.02 E9/L (ref 0–0.2)
BASOPHILS ABSOLUTE: 0.03 E9/L (ref 0–0.2)
BASOPHILS ABSOLUTE: 0.04 E9/L (ref 0–0.2)
BASOPHILS RELATIVE PERCENT: 0 % (ref 0–2)
BASOPHILS RELATIVE PERCENT: 0.1 % (ref 0–2)
BASOPHILS RELATIVE PERCENT: 0.1 % (ref 0–2)
BASOPHILS RELATIVE PERCENT: 0.3 % (ref 0–2)
BASOPHILS RELATIVE PERCENT: 0.3 % (ref 0–2)
BASOPHILS RELATIVE PERCENT: 0.4 % (ref 0–2)
BILIRUB SERPL-MCNC: 0.2 MG/DL (ref 0–1.2)
BILIRUB SERPL-MCNC: 0.3 MG/DL (ref 0–1.2)
BILIRUB SERPL-MCNC: 0.4 MG/DL (ref 0–1.2)
BILIRUB SERPL-MCNC: 0.4 MG/DL (ref 0–1.2)
BILIRUB SERPL-MCNC: 0.5 MG/DL (ref 0–1.2)
BILIRUB SERPL-MCNC: 0.6 MG/DL (ref 0–1.2)
BILIRUB SERPL-MCNC: <0.2 MG/DL (ref 0–1.2)
BILIRUBIN URINE: ABNORMAL
BLOOD BANK DISPENSE STATUS: NORMAL
BLOOD BANK PRODUCT CODE: NORMAL
BLOOD CULTURE, ROUTINE: ABNORMAL
BLOOD CULTURE, ROUTINE: NORMAL
BLOOD, URINE: NEGATIVE
BPU ID: NORMAL
BUN BLDV-MCNC: 13 MG/DL (ref 8–23)
BUN BLDV-MCNC: 15 MG/DL (ref 8–23)
BUN BLDV-MCNC: 16 MG/DL (ref 8–23)
BUN BLDV-MCNC: 16 MG/DL (ref 8–23)
BUN BLDV-MCNC: 18 MG/DL (ref 8–23)
BUN BLDV-MCNC: 20 MG/DL (ref 8–23)
BUN BLDV-MCNC: 21 MG/DL (ref 8–23)
BUN BLDV-MCNC: 22 MG/DL (ref 8–23)
BUN BLDV-MCNC: 23 MG/DL (ref 8–23)
BUN BLDV-MCNC: 25 MG/DL (ref 8–23)
BUN BLDV-MCNC: 25 MG/DL (ref 8–23)
BUN BLDV-MCNC: 27 MG/DL (ref 8–23)
BUN BLDV-MCNC: 27 MG/DL (ref 8–23)
BUN BLDV-MCNC: 29 MG/DL (ref 8–23)
BURR CELLS: ABNORMAL
CALCIUM IONIZED: 1.3 MMOL/L (ref 1.15–1.33)
CALCIUM IONIZED: 1.34 MMOL/L (ref 1.15–1.33)
CALCIUM IONIZED: 1.35 MMOL/L (ref 1.15–1.33)
CALCIUM SERPL-MCNC: 10.4 MG/DL (ref 8.6–10.2)
CALCIUM SERPL-MCNC: 8.7 MG/DL (ref 8.6–10.2)
CALCIUM SERPL-MCNC: 8.7 MG/DL (ref 8.6–10.2)
CALCIUM SERPL-MCNC: 8.9 MG/DL (ref 8.6–10.2)
CALCIUM SERPL-MCNC: 9 MG/DL (ref 8.6–10.2)
CALCIUM SERPL-MCNC: 9 MG/DL (ref 8.6–10.2)
CALCIUM SERPL-MCNC: 9.1 MG/DL (ref 8.6–10.2)
CALCIUM SERPL-MCNC: 9.1 MG/DL (ref 8.6–10.2)
CALCIUM SERPL-MCNC: 9.2 MG/DL (ref 8.6–10.2)
CALCIUM SERPL-MCNC: 9.3 MG/DL (ref 8.6–10.2)
CALCIUM SERPL-MCNC: 9.5 MG/DL (ref 8.6–10.2)
CALCIUM SERPL-MCNC: 9.5 MG/DL (ref 8.6–10.2)
CHLORIDE BLD-SCNC: 102 MMOL/L (ref 98–107)
CHLORIDE BLD-SCNC: 103 MMOL/L (ref 98–107)
CHLORIDE BLD-SCNC: 104 MMOL/L (ref 98–107)
CHLORIDE BLD-SCNC: 105 MMOL/L (ref 98–107)
CHLORIDE BLD-SCNC: 106 MMOL/L (ref 98–107)
CHLORIDE BLD-SCNC: 107 MMOL/L (ref 98–107)
CHLORIDE BLD-SCNC: 108 MMOL/L (ref 98–107)
CHLORIDE BLD-SCNC: 98 MMOL/L (ref 98–107)
CLARITY: CLEAR
CO2: 21 MMOL/L (ref 22–29)
CO2: 22 MMOL/L (ref 22–29)
CO2: 23 MMOL/L (ref 22–29)
CO2: 24 MMOL/L (ref 22–29)
CO2: 24 MMOL/L (ref 22–29)
CO2: 26 MMOL/L (ref 22–29)
CO2: 27 MMOL/L (ref 22–29)
CO2: 28 MMOL/L (ref 22–29)
CO2: 29 MMOL/L (ref 22–29)
CO2: 29 MMOL/L (ref 22–29)
COLOR: YELLOW
CREAT SERPL-MCNC: 0.5 MG/DL (ref 0.5–1)
CREAT SERPL-MCNC: 0.6 MG/DL (ref 0.5–1)
CREAT SERPL-MCNC: 0.7 MG/DL (ref 0.5–1)
CREAT SERPL-MCNC: 0.8 MG/DL (ref 0.5–1)
CREAT SERPL-MCNC: 0.9 MG/DL (ref 0.5–1)
CREAT SERPL-MCNC: 1 MG/DL (ref 0.5–1)
CREAT SERPL-MCNC: 1 MG/DL (ref 0.5–1)
CULTURE, BLOOD 2: NORMAL
CULTURE, RESPIRATORY: NORMAL
DESCRIPTION BLOOD BANK: NORMAL
EKG ATRIAL RATE: 137 BPM
EKG ATRIAL RATE: 96 BPM
EKG ATRIAL RATE: 99 BPM
EKG P AXIS: 36 DEGREES
EKG P AXIS: 38 DEGREES
EKG P AXIS: 43 DEGREES
EKG P-R INTERVAL: 138 MS
EKG P-R INTERVAL: 138 MS
EKG P-R INTERVAL: 156 MS
EKG Q-T INTERVAL: 268 MS
EKG Q-T INTERVAL: 338 MS
EKG Q-T INTERVAL: 382 MS
EKG QRS DURATION: 76 MS
EKG QRS DURATION: 76 MS
EKG QRS DURATION: 80 MS
EKG QTC CALCULATION (BAZETT): 404 MS
EKG QTC CALCULATION (BAZETT): 427 MS
EKG QTC CALCULATION (BAZETT): 490 MS
EKG R AXIS: -5 DEGREES
EKG R AXIS: -6 DEGREES
EKG R AXIS: 4 DEGREES
EKG T AXIS: -42 DEGREES
EKG T AXIS: 137 DEGREES
EKG T AXIS: 33 DEGREES
EKG VENTRICULAR RATE: 137 BPM
EKG VENTRICULAR RATE: 96 BPM
EKG VENTRICULAR RATE: 99 BPM
EOSINOPHILS ABSOLUTE: 0 E9/L (ref 0.05–0.5)
EOSINOPHILS ABSOLUTE: 0 E9/L (ref 0.05–0.5)
EOSINOPHILS ABSOLUTE: 0.02 E9/L (ref 0.05–0.5)
EOSINOPHILS ABSOLUTE: 0.12 E9/L (ref 0.05–0.5)
EOSINOPHILS ABSOLUTE: 0.29 E9/L (ref 0.05–0.5)
EOSINOPHILS ABSOLUTE: 0.46 E9/L (ref 0.05–0.5)
EOSINOPHILS ABSOLUTE: 0.51 E9/L (ref 0.05–0.5)
EOSINOPHILS ABSOLUTE: 0.53 E9/L (ref 0.05–0.5)
EOSINOPHILS ABSOLUTE: 0.58 E9/L (ref 0.05–0.5)
EOSINOPHILS ABSOLUTE: 0.89 E9/L (ref 0.05–0.5)
EOSINOPHILS ABSOLUTE: 0.93 E9/L (ref 0.05–0.5)
EOSINOPHILS RELATIVE PERCENT: 0 % (ref 0–6)
EOSINOPHILS RELATIVE PERCENT: 0 % (ref 0–6)
EOSINOPHILS RELATIVE PERCENT: 0.1 % (ref 0–6)
EOSINOPHILS RELATIVE PERCENT: 1 % (ref 0–6)
EOSINOPHILS RELATIVE PERCENT: 2.6 % (ref 0–6)
EOSINOPHILS RELATIVE PERCENT: 4.3 % (ref 0–6)
EOSINOPHILS RELATIVE PERCENT: 4.6 % (ref 0–6)
EOSINOPHILS RELATIVE PERCENT: 4.7 % (ref 0–6)
EOSINOPHILS RELATIVE PERCENT: 5.2 % (ref 0–6)
EOSINOPHILS RELATIVE PERCENT: 5.3 % (ref 0–6)
EOSINOPHILS RELATIVE PERCENT: 5.6 % (ref 0–6)
EPITHELIAL CELLS, UA: ABNORMAL /HPF
FILM ARRAY ADENOVIRUS: NORMAL
FILM ARRAY BORDETELLA PERTUSSIS: NORMAL
FILM ARRAY CHLAMYDOPHILIA PNEUMONIAE: NORMAL
FILM ARRAY CORONAVIRUS 229E: NORMAL
FILM ARRAY CORONAVIRUS HKU1: NORMAL
FILM ARRAY CORONAVIRUS NL63: NORMAL
FILM ARRAY CORONAVIRUS OC43: NORMAL
FILM ARRAY INFLUENZA A VIRUS 09H1: NORMAL
FILM ARRAY INFLUENZA A VIRUS H1: NORMAL
FILM ARRAY INFLUENZA A VIRUS H3: NORMAL
FILM ARRAY INFLUENZA A VIRUS: NORMAL
FILM ARRAY INFLUENZA B: NORMAL
FILM ARRAY METAPNEUMOVIRUS: NORMAL
FILM ARRAY MYCOPLASMA PNEUMONIAE: NORMAL
FILM ARRAY PARAINFLUENZA VIRUS 1: NORMAL
FILM ARRAY PARAINFLUENZA VIRUS 2: NORMAL
FILM ARRAY PARAINFLUENZA VIRUS 3: NORMAL
FILM ARRAY PARAINFLUENZA VIRUS 4: NORMAL
FILM ARRAY RESPIRATORY SYNCITIAL VIRUS: NORMAL
FILM ARRAY RHINOVIRUS/ENTEROVIRUS: NORMAL
GFR AFRICAN AMERICAN: >60
GFR NON-AFRICAN AMERICAN: >60 ML/MIN/1.73
GLUCOSE BLD-MCNC: 100 MG/DL (ref 74–99)
GLUCOSE BLD-MCNC: 104 MG/DL (ref 74–99)
GLUCOSE BLD-MCNC: 110 MG/DL (ref 74–99)
GLUCOSE BLD-MCNC: 111 MG/DL (ref 74–99)
GLUCOSE BLD-MCNC: 115 MG/DL (ref 74–99)
GLUCOSE BLD-MCNC: 117 MG/DL (ref 74–99)
GLUCOSE BLD-MCNC: 120 MG/DL (ref 74–99)
GLUCOSE BLD-MCNC: 120 MG/DL (ref 74–99)
GLUCOSE BLD-MCNC: 121 MG/DL (ref 74–99)
GLUCOSE BLD-MCNC: 122 MG/DL (ref 74–99)
GLUCOSE BLD-MCNC: 125 MG/DL (ref 74–99)
GLUCOSE BLD-MCNC: 130 MG/DL (ref 74–99)
GLUCOSE BLD-MCNC: 152 MG/DL (ref 74–99)
GLUCOSE BLD-MCNC: 167 MG/DL (ref 74–99)
GLUCOSE URINE: NEGATIVE MG/DL
HCT VFR BLD CALC: 27.7 % (ref 34–48)
HCT VFR BLD CALC: 29.6 % (ref 34–48)
HCT VFR BLD CALC: 30.7 % (ref 34–48)
HCT VFR BLD CALC: 31.2 % (ref 34–48)
HCT VFR BLD CALC: 31.5 % (ref 34–48)
HCT VFR BLD CALC: 31.7 % (ref 34–48)
HCT VFR BLD CALC: 32.3 % (ref 34–48)
HCT VFR BLD CALC: 32.4 % (ref 34–48)
HCT VFR BLD CALC: 33.3 % (ref 34–48)
HCT VFR BLD CALC: 33.7 % (ref 34–48)
HCT VFR BLD CALC: 34.3 % (ref 34–48)
HCT VFR BLD CALC: 35.8 % (ref 34–48)
HCT VFR BLD CALC: 36 % (ref 34–48)
HCT VFR BLD CALC: 38.1 % (ref 34–48)
HEMOGLOBIN: 10 G/DL (ref 11.5–15.5)
HEMOGLOBIN: 10.1 G/DL (ref 11.5–15.5)
HEMOGLOBIN: 10.2 G/DL (ref 11.5–15.5)
HEMOGLOBIN: 10.3 G/DL (ref 11.5–15.5)
HEMOGLOBIN: 10.5 G/DL (ref 11.5–15.5)
HEMOGLOBIN: 10.9 G/DL (ref 11.5–15.5)
HEMOGLOBIN: 11.2 G/DL (ref 11.5–15.5)
HEMOGLOBIN: 11.4 G/DL (ref 11.5–15.5)
HEMOGLOBIN: 11.9 G/DL (ref 11.5–15.5)
HEMOGLOBIN: 12.4 G/DL (ref 11.5–15.5)
HEMOGLOBIN: 8.8 G/DL (ref 11.5–15.5)
HEMOGLOBIN: 9.3 G/DL (ref 11.5–15.5)
HEMOGLOBIN: 9.5 G/DL (ref 11.5–15.5)
HEMOGLOBIN: 9.9 G/DL (ref 11.5–15.5)
HYPOCHROMIA: ABNORMAL
IMMATURE GRANULOCYTES #: 0.04 E9/L
IMMATURE GRANULOCYTES #: 0.05 E9/L
IMMATURE GRANULOCYTES #: 0.07 E9/L
IMMATURE GRANULOCYTES #: 0.1 E9/L
IMMATURE GRANULOCYTES #: 1.1 E9/L
IMMATURE GRANULOCYTES %: 0.4 % (ref 0–5)
IMMATURE GRANULOCYTES %: 0.5 % (ref 0–5)
IMMATURE GRANULOCYTES %: 0.5 % (ref 0–5)
IMMATURE GRANULOCYTES %: 0.6 % (ref 0–5)
IMMATURE GRANULOCYTES %: 0.6 % (ref 0–5)
IMMATURE GRANULOCYTES %: 1.1 % (ref 0–5)
INR BLD: 1.1
INR BLD: 1.3
KETONES, URINE: NEGATIVE MG/DL
L. PNEUMOPHILA SEROGP 1 UR AG: NORMAL
LACTIC ACID, SEPSIS: 2.8 MMOL/L (ref 0.5–1.9)
LACTIC ACID: 1.5 MMOL/L (ref 0.5–2.2)
LEUKOCYTE ESTERASE, URINE: NEGATIVE
LIPASE: 9 U/L (ref 13–60)
LYMPHOCYTES ABSOLUTE: 0 E9/L (ref 1.5–4)
LYMPHOCYTES ABSOLUTE: 0.41 E9/L (ref 1.5–4)
LYMPHOCYTES ABSOLUTE: 1.51 E9/L (ref 1.5–4)
LYMPHOCYTES ABSOLUTE: 1.65 E9/L (ref 1.5–4)
LYMPHOCYTES ABSOLUTE: 1.67 E9/L (ref 1.5–4)
LYMPHOCYTES ABSOLUTE: 1.74 E9/L (ref 1.5–4)
LYMPHOCYTES ABSOLUTE: 1.87 E9/L (ref 1.5–4)
LYMPHOCYTES ABSOLUTE: 2.04 E9/L (ref 1.5–4)
LYMPHOCYTES ABSOLUTE: 2.09 E9/L (ref 1.5–4)
LYMPHOCYTES ABSOLUTE: 2.33 E9/L (ref 1.5–4)
LYMPHOCYTES ABSOLUTE: 2.69 E9/L (ref 1.5–4)
LYMPHOCYTES RELATIVE PERCENT: 0 % (ref 20–42)
LYMPHOCYTES RELATIVE PERCENT: 1.7 % (ref 20–42)
LYMPHOCYTES RELATIVE PERCENT: 11.9 % (ref 20–42)
LYMPHOCYTES RELATIVE PERCENT: 12.9 % (ref 20–42)
LYMPHOCYTES RELATIVE PERCENT: 15 % (ref 20–42)
LYMPHOCYTES RELATIVE PERCENT: 16.7 % (ref 20–42)
LYMPHOCYTES RELATIVE PERCENT: 17 % (ref 20–42)
LYMPHOCYTES RELATIVE PERCENT: 20.4 % (ref 20–42)
LYMPHOCYTES RELATIVE PERCENT: 20.6 % (ref 20–42)
LYMPHOCYTES RELATIVE PERCENT: 23.6 % (ref 20–42)
LYMPHOCYTES RELATIVE PERCENT: 8.9 % (ref 20–42)
MAGNESIUM: 1.9 MG/DL (ref 1.6–2.6)
MAGNESIUM: 2 MG/DL (ref 1.6–2.6)
MAGNESIUM: 2.1 MG/DL (ref 1.6–2.6)
MAGNESIUM: 2.1 MG/DL (ref 1.6–2.6)
MAGNESIUM: 2.2 MG/DL (ref 1.6–2.6)
MAGNESIUM: 2.2 MG/DL (ref 1.6–2.6)
MCH RBC QN AUTO: 26.3 PG (ref 26–35)
MCH RBC QN AUTO: 26.4 PG (ref 26–35)
MCH RBC QN AUTO: 26.7 PG (ref 26–35)
MCH RBC QN AUTO: 26.8 PG (ref 26–35)
MCH RBC QN AUTO: 26.8 PG (ref 26–35)
MCH RBC QN AUTO: 27.2 PG (ref 26–35)
MCH RBC QN AUTO: 27.3 PG (ref 26–35)
MCH RBC QN AUTO: 27.3 PG (ref 26–35)
MCH RBC QN AUTO: 27.4 PG (ref 26–35)
MCH RBC QN AUTO: 27.5 PG (ref 26–35)
MCH RBC QN AUTO: 27.5 PG (ref 26–35)
MCH RBC QN AUTO: 27.6 PG (ref 26–35)
MCHC RBC AUTO-ENTMCNC: 30.9 % (ref 32–34.5)
MCHC RBC AUTO-ENTMCNC: 31.3 % (ref 32–34.5)
MCHC RBC AUTO-ENTMCNC: 31.4 % (ref 32–34.5)
MCHC RBC AUTO-ENTMCNC: 31.4 % (ref 32–34.5)
MCHC RBC AUTO-ENTMCNC: 31.5 % (ref 32–34.5)
MCHC RBC AUTO-ENTMCNC: 31.5 % (ref 32–34.5)
MCHC RBC AUTO-ENTMCNC: 31.8 % (ref 32–34.5)
MCHC RBC AUTO-ENTMCNC: 32.1 % (ref 32–34.5)
MCHC RBC AUTO-ENTMCNC: 32.5 % (ref 32–34.5)
MCHC RBC AUTO-ENTMCNC: 32.5 % (ref 32–34.5)
MCHC RBC AUTO-ENTMCNC: 33.1 % (ref 32–34.5)
MCHC RBC AUTO-ENTMCNC: 33.2 % (ref 32–34.5)
MCV RBC AUTO: 80.6 FL (ref 80–99.9)
MCV RBC AUTO: 80.9 FL (ref 80–99.9)
MCV RBC AUTO: 81.3 FL (ref 80–99.9)
MCV RBC AUTO: 82.9 FL (ref 80–99.9)
MCV RBC AUTO: 84.2 FL (ref 80–99.9)
MCV RBC AUTO: 84.7 FL (ref 80–99.9)
MCV RBC AUTO: 86 FL (ref 80–99.9)
MCV RBC AUTO: 86.3 FL (ref 80–99.9)
MCV RBC AUTO: 86.4 FL (ref 80–99.9)
MCV RBC AUTO: 86.8 FL (ref 80–99.9)
MCV RBC AUTO: 87.1 FL (ref 80–99.9)
MCV RBC AUTO: 87.2 FL (ref 80–99.9)
MCV RBC AUTO: 87.5 FL (ref 80–99.9)
MCV RBC AUTO: 88.2 FL (ref 80–99.9)
METER GLUCOSE: 105 MG/DL (ref 74–99)
METER GLUCOSE: 106 MG/DL (ref 74–99)
METER GLUCOSE: 110 MG/DL (ref 74–99)
METER GLUCOSE: 112 MG/DL (ref 74–99)
METER GLUCOSE: 115 MG/DL (ref 74–99)
METER GLUCOSE: 123 MG/DL (ref 74–99)
METER GLUCOSE: 126 MG/DL (ref 74–99)
METER GLUCOSE: 126 MG/DL (ref 74–99)
METER GLUCOSE: 127 MG/DL (ref 74–99)
METER GLUCOSE: 131 MG/DL (ref 74–99)
METER GLUCOSE: 134 MG/DL (ref 74–99)
METER GLUCOSE: 137 MG/DL (ref 74–99)
METER GLUCOSE: 138 MG/DL (ref 74–99)
METER GLUCOSE: 145 MG/DL (ref 74–99)
METER GLUCOSE: 182 MG/DL (ref 74–99)
MONOCYTES ABSOLUTE: 0 E9/L (ref 0.1–0.95)
MONOCYTES ABSOLUTE: 0.54 E9/L (ref 0.1–0.95)
MONOCYTES ABSOLUTE: 0.74 E9/L (ref 0.1–0.95)
MONOCYTES ABSOLUTE: 0.8 E9/L (ref 0.1–0.95)
MONOCYTES ABSOLUTE: 0.83 E9/L (ref 0.1–0.95)
MONOCYTES ABSOLUTE: 0.83 E9/L (ref 0.1–0.95)
MONOCYTES ABSOLUTE: 0.88 E9/L (ref 0.1–0.95)
MONOCYTES ABSOLUTE: 1.1 E9/L (ref 0.1–0.95)
MONOCYTES ABSOLUTE: 1.23 E9/L (ref 0.1–0.95)
MONOCYTES ABSOLUTE: 1.24 E9/L (ref 0.1–0.95)
MONOCYTES ABSOLUTE: 1.36 E9/L (ref 0.1–0.95)
MONOCYTES RELATIVE PERCENT: 0 % (ref 2–12)
MONOCYTES RELATIVE PERCENT: 3.4 % (ref 2–12)
MONOCYTES RELATIVE PERCENT: 6.1 % (ref 2–12)
MONOCYTES RELATIVE PERCENT: 7.3 % (ref 2–12)
MONOCYTES RELATIVE PERCENT: 7.3 % (ref 2–12)
MONOCYTES RELATIVE PERCENT: 7.5 % (ref 2–12)
MONOCYTES RELATIVE PERCENT: 7.8 % (ref 2–12)
MONOCYTES RELATIVE PERCENT: 7.9 % (ref 2–12)
MONOCYTES RELATIVE PERCENT: 8.6 % (ref 2–12)
MONOCYTES RELATIVE PERCENT: 9.5 % (ref 2–12)
MONOCYTES RELATIVE PERCENT: 9.7 % (ref 2–12)
MRSA CULTURE ONLY: NORMAL
MYELOCYTE PERCENT: 0.9 % (ref 0–0)
MYELOCYTE PERCENT: 0.9 % (ref 0–0)
NEUTROPHILS ABSOLUTE: 0 E9/L (ref 1.8–7.3)
NEUTROPHILS ABSOLUTE: 10.92 E9/L (ref 1.8–7.3)
NEUTROPHILS ABSOLUTE: 14.05 E9/L (ref 1.8–7.3)
NEUTROPHILS ABSOLUTE: 14.14 E9/L (ref 1.8–7.3)
NEUTROPHILS ABSOLUTE: 18.13 E9/L (ref 1.8–7.3)
NEUTROPHILS ABSOLUTE: 6.58 E9/L (ref 1.8–7.3)
NEUTROPHILS ABSOLUTE: 6.62 E9/L (ref 1.8–7.3)
NEUTROPHILS ABSOLUTE: 6.71 E9/L (ref 1.8–7.3)
NEUTROPHILS ABSOLUTE: 7.23 E9/L (ref 1.8–7.3)
NEUTROPHILS ABSOLUTE: 8.03 E9/L (ref 1.8–7.3)
NEUTROPHILS ABSOLUTE: 8.58 E9/L (ref 1.8–7.3)
NEUTROPHILS RELATIVE PERCENT: 0 % (ref 43–80)
NEUTROPHILS RELATIVE PERCENT: 63.4 % (ref 43–80)
NEUTROPHILS RELATIVE PERCENT: 65.3 % (ref 43–80)
NEUTROPHILS RELATIVE PERCENT: 66.4 % (ref 43–80)
NEUTROPHILS RELATIVE PERCENT: 68.4 % (ref 43–80)
NEUTROPHILS RELATIVE PERCENT: 72.8 % (ref 43–80)
NEUTROPHILS RELATIVE PERCENT: 73.8 % (ref 43–80)
NEUTROPHILS RELATIVE PERCENT: 77.6 % (ref 43–80)
NEUTROPHILS RELATIVE PERCENT: 77.8 % (ref 43–80)
NEUTROPHILS RELATIVE PERCENT: 83.1 % (ref 43–80)
NEUTROPHILS RELATIVE PERCENT: 87 % (ref 43–80)
NITRITE, URINE: NEGATIVE
ORGANISM: ABNORMAL
ORGANISM: ABNORMAL
OVALOCYTES: ABNORMAL
OVALOCYTES: ABNORMAL
PDW BLD-RTO: 14.8 FL (ref 11.5–15)
PDW BLD-RTO: 14.9 FL (ref 11.5–15)
PDW BLD-RTO: 15 FL (ref 11.5–15)
PDW BLD-RTO: 15 FL (ref 11.5–15)
PDW BLD-RTO: 15.1 FL (ref 11.5–15)
PDW BLD-RTO: 15.1 FL (ref 11.5–15)
PDW BLD-RTO: 15.2 FL (ref 11.5–15)
PDW BLD-RTO: 15.3 FL (ref 11.5–15)
PDW BLD-RTO: 15.3 FL (ref 11.5–15)
PDW BLD-RTO: 15.5 FL (ref 11.5–15)
PDW BLD-RTO: 15.8 FL (ref 11.5–15)
PDW BLD-RTO: 15.9 FL (ref 11.5–15)
PDW BLD-RTO: 15.9 FL (ref 11.5–15)
PDW BLD-RTO: 16.7 FL (ref 11.5–15)
PH UA: 5.5 (ref 5–9)
PHOSPHORUS: 2.8 MG/DL (ref 2.5–4.5)
PHOSPHORUS: 3 MG/DL (ref 2.5–4.5)
PHOSPHORUS: 3.3 MG/DL (ref 2.5–4.5)
PHOSPHORUS: 3.4 MG/DL (ref 2.5–4.5)
PHOSPHORUS: 3.4 MG/DL (ref 2.5–4.5)
PHOSPHORUS: 3.6 MG/DL (ref 2.5–4.5)
PHOSPHORUS: 3.7 MG/DL (ref 2.5–4.5)
PLATELET # BLD: 353 E9/L (ref 130–450)
PLATELET # BLD: 367 E9/L (ref 130–450)
PLATELET # BLD: 371 E9/L (ref 130–450)
PLATELET # BLD: 386 E9/L (ref 130–450)
PLATELET # BLD: 402 E9/L (ref 130–450)
PLATELET # BLD: 424 E9/L (ref 130–450)
PLATELET # BLD: 425 E9/L (ref 130–450)
PLATELET # BLD: 440 E9/L (ref 130–450)
PLATELET # BLD: 443 E9/L (ref 130–450)
PLATELET # BLD: 449 E9/L (ref 130–450)
PLATELET # BLD: 459 E9/L (ref 130–450)
PLATELET # BLD: 485 E9/L (ref 130–450)
PLATELET # BLD: 486 E9/L (ref 130–450)
PLATELET # BLD: 515 E9/L (ref 130–450)
PMV BLD AUTO: 10.1 FL (ref 7–12)
PMV BLD AUTO: 10.2 FL (ref 7–12)
PMV BLD AUTO: 8.4 FL (ref 7–12)
PMV BLD AUTO: 8.7 FL (ref 7–12)
PMV BLD AUTO: 8.9 FL (ref 7–12)
PMV BLD AUTO: 9.1 FL (ref 7–12)
PMV BLD AUTO: 9.2 FL (ref 7–12)
PMV BLD AUTO: 9.4 FL (ref 7–12)
PMV BLD AUTO: 9.7 FL (ref 7–12)
PMV BLD AUTO: 9.8 FL (ref 7–12)
POIKILOCYTES: ABNORMAL
POLYCHROMASIA: ABNORMAL
POTASSIUM REFLEX MAGNESIUM: 3.7 MMOL/L (ref 3.5–5)
POTASSIUM REFLEX MAGNESIUM: 3.8 MMOL/L (ref 3.5–5)
POTASSIUM REFLEX MAGNESIUM: 3.8 MMOL/L (ref 3.5–5)
POTASSIUM REFLEX MAGNESIUM: 3.9 MMOL/L (ref 3.5–5)
POTASSIUM REFLEX MAGNESIUM: 4.2 MMOL/L (ref 3.5–5)
POTASSIUM REFLEX MAGNESIUM: 4.3 MMOL/L (ref 3.5–5)
POTASSIUM REFLEX MAGNESIUM: 4.6 MMOL/L (ref 3.5–5)
POTASSIUM REFLEX MAGNESIUM: 5 MMOL/L (ref 3.5–5)
POTASSIUM SERPL-SCNC: 3.7 MMOL/L (ref 3.5–5)
POTASSIUM SERPL-SCNC: 4.1 MMOL/L (ref 3.5–5)
POTASSIUM SERPL-SCNC: 4.3 MMOL/L (ref 3.5–5)
POTASSIUM SERPL-SCNC: 4.4 MMOL/L (ref 3.5–5)
PRO-BNP: 174 PG/ML (ref 0–450)
PRO-BNP: 246 PG/ML (ref 0–450)
PRO-BNP: 515 PG/ML (ref 0–450)
PROCALCITONIN: 0.49 NG/ML (ref 0–0.08)
PROCALCITONIN: 1.07 NG/ML (ref 0–0.08)
PROTEIN UA: 30 MG/DL
PROTHROMBIN TIME: 12.2 SEC (ref 9.3–12.4)
PROTHROMBIN TIME: 15 SEC (ref 9.3–12.4)
RBC # BLD: 3.21 E12/L (ref 3.5–5.5)
RBC # BLD: 3.4 E12/L (ref 3.5–5.5)
RBC # BLD: 3.48 E12/L (ref 3.5–5.5)
RBC # BLD: 3.63 E12/L (ref 3.5–5.5)
RBC # BLD: 3.63 E12/L (ref 3.5–5.5)
RBC # BLD: 3.69 E12/L (ref 3.5–5.5)
RBC # BLD: 3.75 E12/L (ref 3.5–5.5)
RBC # BLD: 3.82 E12/L (ref 3.5–5.5)
RBC # BLD: 3.9 E12/L (ref 3.5–5.5)
RBC # BLD: 4.14 E12/L (ref 3.5–5.5)
RBC # BLD: 4.18 E12/L (ref 3.5–5.5)
RBC # BLD: 4.25 E12/L (ref 3.5–5.5)
RBC # BLD: 4.45 E12/L (ref 3.5–5.5)
RBC # BLD: 4.5 E12/L (ref 3.5–5.5)
RBC UA: ABNORMAL /HPF (ref 0–2)
REASON FOR REJECTION: NORMAL
REJECTED TEST: NORMAL
SMEAR, RESPIRATORY: NORMAL
SODIUM BLD-SCNC: 133 MMOL/L (ref 132–146)
SODIUM BLD-SCNC: 138 MMOL/L (ref 132–146)
SODIUM BLD-SCNC: 138 MMOL/L (ref 132–146)
SODIUM BLD-SCNC: 139 MMOL/L (ref 132–146)
SODIUM BLD-SCNC: 139 MMOL/L (ref 132–146)
SODIUM BLD-SCNC: 140 MMOL/L (ref 132–146)
SODIUM BLD-SCNC: 140 MMOL/L (ref 132–146)
SODIUM BLD-SCNC: 141 MMOL/L (ref 132–146)
SODIUM BLD-SCNC: 144 MMOL/L (ref 132–146)
SODIUM BLD-SCNC: 145 MMOL/L (ref 132–146)
SODIUM BLD-SCNC: 145 MMOL/L (ref 132–146)
SPECIFIC GRAVITY UA: 1.02 (ref 1–1.03)
STREP PNEUMONIAE ANTIGEN, URINE: NORMAL
TOTAL PROTEIN: 5.6 G/DL (ref 6.4–8.3)
TOTAL PROTEIN: 5.7 G/DL (ref 6.4–8.3)
TOTAL PROTEIN: 5.7 G/DL (ref 6.4–8.3)
TOTAL PROTEIN: 5.9 G/DL (ref 6.4–8.3)
TOTAL PROTEIN: 6.1 G/DL (ref 6.4–8.3)
TOTAL PROTEIN: 6.2 G/DL (ref 6.4–8.3)
TOTAL PROTEIN: 6.2 G/DL (ref 6.4–8.3)
TOTAL PROTEIN: 6.8 G/DL (ref 6.4–8.3)
TOTAL PROTEIN: 7.1 G/DL (ref 6.4–8.3)
TROPONIN: <0.01 NG/ML (ref 0–0.03)
URINE CULTURE, ROUTINE: NORMAL
UROBILINOGEN, URINE: 1 E.U./DL
VANCOMYCIN RANDOM: 7.9 MCG/ML (ref 5–40)
WBC # BLD: 10.3 E9/L (ref 4.5–11.5)
WBC # BLD: 10.7 E9/L (ref 4.5–11.5)
WBC # BLD: 11 E9/L (ref 4.5–11.5)
WBC # BLD: 11.4 E9/L (ref 4.5–11.5)
WBC # BLD: 11.6 E9/L (ref 4.5–11.5)
WBC # BLD: 14 E9/L (ref 4.5–11.5)
WBC # BLD: 16.3 E9/L (ref 4.5–11.5)
WBC # BLD: 16.9 E9/L (ref 4.5–11.5)
WBC # BLD: 17.7 E9/L (ref 4.5–11.5)
WBC # BLD: 17.9 E9/L (ref 4.5–11.5)
WBC # BLD: 20.6 E9/L (ref 4.5–11.5)
WBC # BLD: 6.7 E9/L (ref 4.5–11.5)
WBC # BLD: 9.7 E9/L (ref 4.5–11.5)
WBC # BLD: 9.9 E9/L (ref 4.5–11.5)
WBC UA: ABNORMAL /HPF (ref 0–5)

## 2019-01-01 PROCEDURE — 2580000003 HC RX 258: Performed by: STUDENT IN AN ORGANIZED HEALTH CARE EDUCATION/TRAINING PROGRAM

## 2019-01-01 PROCEDURE — 82962 GLUCOSE BLOOD TEST: CPT

## 2019-01-01 PROCEDURE — 84100 ASSAY OF PHOSPHORUS: CPT

## 2019-01-01 PROCEDURE — 99285 EMERGENCY DEPT VISIT HI MDM: CPT

## 2019-01-01 PROCEDURE — 7100000010 HC PHASE II RECOVERY - FIRST 15 MIN: Performed by: TRANSPLANT SURGERY

## 2019-01-01 PROCEDURE — 6370000000 HC RX 637 (ALT 250 FOR IP): Performed by: INTERNAL MEDICINE

## 2019-01-01 PROCEDURE — 36415 COLL VENOUS BLD VENIPUNCTURE: CPT

## 2019-01-01 PROCEDURE — 2580000003 HC RX 258: Performed by: ANESTHESIOLOGY

## 2019-01-01 PROCEDURE — G8400 PT W/DXA NO RESULTS DOC: HCPCS | Performed by: TRANSPLANT SURGERY

## 2019-01-01 PROCEDURE — 94669 MECHANICAL CHEST WALL OSCILL: CPT

## 2019-01-01 PROCEDURE — 6360000002 HC RX W HCPCS: Performed by: EMERGENCY MEDICINE

## 2019-01-01 PROCEDURE — 86850 RBC ANTIBODY SCREEN: CPT

## 2019-01-01 PROCEDURE — C9113 INJ PANTOPRAZOLE SODIUM, VIA: HCPCS | Performed by: STUDENT IN AN ORGANIZED HEALTH CARE EDUCATION/TRAINING PROGRAM

## 2019-01-01 PROCEDURE — 87798 DETECT AGENT NOS DNA AMP: CPT

## 2019-01-01 PROCEDURE — 6360000002 HC RX W HCPCS: Performed by: STUDENT IN AN ORGANIZED HEALTH CARE EDUCATION/TRAINING PROGRAM

## 2019-01-01 PROCEDURE — 80053 COMPREHEN METABOLIC PANEL: CPT

## 2019-01-01 PROCEDURE — 2700000000 HC OXYGEN THERAPY PER DAY

## 2019-01-01 PROCEDURE — 1101F PT FALLS ASSESS-DOCD LE1/YR: CPT | Performed by: TRANSPLANT SURGERY

## 2019-01-01 PROCEDURE — 88305 TISSUE EXAM BY PATHOLOGIST: CPT

## 2019-01-01 PROCEDURE — 97530 THERAPEUTIC ACTIVITIES: CPT

## 2019-01-01 PROCEDURE — 80048 BASIC METABOLIC PNL TOTAL CA: CPT

## 2019-01-01 PROCEDURE — 85025 COMPLETE CBC W/AUTO DIFF WBC: CPT

## 2019-01-01 PROCEDURE — 97535 SELF CARE MNGMENT TRAINING: CPT

## 2019-01-01 PROCEDURE — 71250 CT THORAX DX C-: CPT

## 2019-01-01 PROCEDURE — 3609010600 HC COLONOSCOPY POLYPECTOMY SNARE/COLD BIOPSY: Performed by: TRANSPLANT SURGERY

## 2019-01-01 PROCEDURE — 88331 PATH CONSLTJ SURG 1 BLK 1SPC: CPT

## 2019-01-01 PROCEDURE — 6360000002 HC RX W HCPCS: Performed by: ANESTHESIOLOGY

## 2019-01-01 PROCEDURE — 2500000003 HC RX 250 WO HCPCS: Performed by: STUDENT IN AN ORGANIZED HEALTH CARE EDUCATION/TRAINING PROGRAM

## 2019-01-01 PROCEDURE — 6360000002 HC RX W HCPCS: Performed by: INTERNAL MEDICINE

## 2019-01-01 PROCEDURE — 87149 DNA/RNA DIRECT PROBE: CPT

## 2019-01-01 PROCEDURE — 97166 OT EVAL MOD COMPLEX 45 MIN: CPT

## 2019-01-01 PROCEDURE — 82330 ASSAY OF CALCIUM: CPT

## 2019-01-01 PROCEDURE — 71045 X-RAY EXAM CHEST 1 VIEW: CPT

## 2019-01-01 PROCEDURE — 94640 AIRWAY INHALATION TREATMENT: CPT

## 2019-01-01 PROCEDURE — 87077 CULTURE AEROBIC IDENTIFY: CPT

## 2019-01-01 PROCEDURE — 2580000003 HC RX 258: Performed by: EMERGENCY MEDICINE

## 2019-01-01 PROCEDURE — 83880 ASSAY OF NATRIURETIC PEPTIDE: CPT

## 2019-01-01 PROCEDURE — 81001 URINALYSIS AUTO W/SCOPE: CPT

## 2019-01-01 PROCEDURE — 2140000000 HC CCU INTERMEDIATE R&B

## 2019-01-01 PROCEDURE — 83735 ASSAY OF MAGNESIUM: CPT

## 2019-01-01 PROCEDURE — 6370000000 HC RX 637 (ALT 250 FOR IP): Performed by: STUDENT IN AN ORGANIZED HEALTH CARE EDUCATION/TRAINING PROGRAM

## 2019-01-01 PROCEDURE — 2580000003 HC RX 258: Performed by: INTERNAL MEDICINE

## 2019-01-01 PROCEDURE — 6360000002 HC RX W HCPCS: Performed by: SURGERY

## 2019-01-01 PROCEDURE — 6370000000 HC RX 637 (ALT 250 FOR IP): Performed by: EMERGENCY MEDICINE

## 2019-01-01 PROCEDURE — 83605 ASSAY OF LACTIC ACID: CPT

## 2019-01-01 PROCEDURE — G8427 DOCREV CUR MEDS BY ELIG CLIN: HCPCS | Performed by: TRANSPLANT SURGERY

## 2019-01-01 PROCEDURE — 0W9B3ZZ DRAINAGE OF LEFT PLEURAL CAVITY, PERCUTANEOUS APPROACH: ICD-10-PCS | Performed by: RADIOLOGY

## 2019-01-01 PROCEDURE — 6360000002 HC RX W HCPCS: Performed by: TRANSPLANT SURGERY

## 2019-01-01 PROCEDURE — 87186 SC STD MICRODIL/AGAR DIL: CPT

## 2019-01-01 PROCEDURE — 99211 OFF/OP EST MAY X REQ PHY/QHP: CPT | Performed by: TRANSPLANT SURGERY

## 2019-01-01 PROCEDURE — 2060000000 HC ICU INTERMEDIATE R&B

## 2019-01-01 PROCEDURE — 88309 TISSUE EXAM BY PATHOLOGIST: CPT

## 2019-01-01 PROCEDURE — 99221 1ST HOSP IP/OBS SF/LOW 40: CPT | Performed by: THORACIC SURGERY (CARDIOTHORACIC VASCULAR SURGERY)

## 2019-01-01 PROCEDURE — 7100000000 HC PACU RECOVERY - FIRST 15 MIN: Performed by: TRANSPLANT SURGERY

## 2019-01-01 PROCEDURE — 87070 CULTURE OTHR SPECIMN AEROBIC: CPT

## 2019-01-01 PROCEDURE — 84484 ASSAY OF TROPONIN QUANT: CPT

## 2019-01-01 PROCEDURE — 97110 THERAPEUTIC EXERCISES: CPT

## 2019-01-01 PROCEDURE — 6360000002 HC RX W HCPCS: Performed by: NURSE ANESTHETIST, CERTIFIED REGISTERED

## 2019-01-01 PROCEDURE — G8417 CALC BMI ABV UP PARAM F/U: HCPCS | Performed by: TRANSPLANT SURGERY

## 2019-01-01 PROCEDURE — 71046 X-RAY EXAM CHEST 2 VIEWS: CPT

## 2019-01-01 PROCEDURE — 96368 THER/DIAG CONCURRENT INF: CPT

## 2019-01-01 PROCEDURE — 3700000000 HC ANESTHESIA ATTENDED CARE: Performed by: TRANSPLANT SURGERY

## 2019-01-01 PROCEDURE — 99406 BEHAV CHNG SMOKING 3-10 MIN: CPT

## 2019-01-01 PROCEDURE — 99222 1ST HOSP IP/OBS MODERATE 55: CPT | Performed by: EMERGENCY MEDICINE

## 2019-01-01 PROCEDURE — 99024 POSTOP FOLLOW-UP VISIT: CPT | Performed by: TRANSPLANT SURGERY

## 2019-01-01 PROCEDURE — 85027 COMPLETE CBC AUTOMATED: CPT

## 2019-01-01 PROCEDURE — 94150 VITAL CAPACITY TEST: CPT

## 2019-01-01 PROCEDURE — 85730 THROMBOPLASTIN TIME PARTIAL: CPT

## 2019-01-01 PROCEDURE — 45380 COLONOSCOPY AND BIOPSY: CPT | Performed by: TRANSPLANT SURGERY

## 2019-01-01 PROCEDURE — 96375 TX/PRO/DX INJ NEW DRUG ADDON: CPT

## 2019-01-01 PROCEDURE — 87088 URINE BACTERIA CULTURE: CPT

## 2019-01-01 PROCEDURE — 94664 DEMO&/EVAL PT USE INHALER: CPT

## 2019-01-01 PROCEDURE — 1090F PRES/ABSN URINE INCON ASSESS: CPT | Performed by: TRANSPLANT SURGERY

## 2019-01-01 PROCEDURE — 87581 M.PNEUMON DNA AMP PROBE: CPT

## 2019-01-01 PROCEDURE — C1773 RET DEV, INSERTABLE: HCPCS | Performed by: TRANSPLANT SURGERY

## 2019-01-01 PROCEDURE — 87040 BLOOD CULTURE FOR BACTERIA: CPT

## 2019-01-01 PROCEDURE — 3600000004 HC SURGERY LEVEL 4 BASE: Performed by: TRANSPLANT SURGERY

## 2019-01-01 PROCEDURE — 94760 N-INVAS EAR/PLS OXIMETRY 1: CPT

## 2019-01-01 PROCEDURE — 87486 CHLMYD PNEUM DNA AMP PROBE: CPT

## 2019-01-01 PROCEDURE — P9041 ALBUMIN (HUMAN),5%, 50ML: HCPCS | Performed by: ANESTHESIOLOGY

## 2019-01-01 PROCEDURE — 84145 PROCALCITONIN (PCT): CPT

## 2019-01-01 PROCEDURE — 88307 TISSUE EXAM BY PATHOLOGIST: CPT

## 2019-01-01 PROCEDURE — 86923 COMPATIBILITY TEST ELECTRIC: CPT

## 2019-01-01 PROCEDURE — 4040F PNEUMOC VAC/ADMIN/RCVD: CPT | Performed by: TRANSPLANT SURGERY

## 2019-01-01 PROCEDURE — 83690 ASSAY OF LIPASE: CPT

## 2019-01-01 PROCEDURE — 85610 PROTHROMBIN TIME: CPT

## 2019-01-01 PROCEDURE — 3609012400 HC EGD TRANSORAL BIOPSY SINGLE/MULTIPLE: Performed by: TRANSPLANT SURGERY

## 2019-01-01 PROCEDURE — 1200000000 HC SEMI PRIVATE

## 2019-01-01 PROCEDURE — 3700000001 HC ADD 15 MINUTES (ANESTHESIA): Performed by: TRANSPLANT SURGERY

## 2019-01-01 PROCEDURE — 93005 ELECTROCARDIOGRAM TRACING: CPT | Performed by: NURSE PRACTITIONER

## 2019-01-01 PROCEDURE — 99214 OFFICE O/P EST MOD 30 MIN: CPT | Performed by: TRANSPLANT SURGERY

## 2019-01-01 PROCEDURE — 87081 CULTURE SCREEN ONLY: CPT

## 2019-01-01 PROCEDURE — 6360000004 HC RX CONTRAST MEDICATION: Performed by: RADIOLOGY

## 2019-01-01 PROCEDURE — 87633 RESP VIRUS 12-25 TARGETS: CPT

## 2019-01-01 PROCEDURE — 96374 THER/PROPH/DIAG INJ IV PUSH: CPT

## 2019-01-01 PROCEDURE — 2500000003 HC RX 250 WO HCPCS: Performed by: EMERGENCY MEDICINE

## 2019-01-01 PROCEDURE — 99233 SBSQ HOSP IP/OBS HIGH 50: CPT | Performed by: PHYSICIAN ASSISTANT

## 2019-01-01 PROCEDURE — G8484 FLU IMMUNIZE NO ADMIN: HCPCS | Performed by: TRANSPLANT SURGERY

## 2019-01-01 PROCEDURE — 88342 IMHCHEM/IMCYTCHM 1ST ANTB: CPT

## 2019-01-01 PROCEDURE — 93005 ELECTROCARDIOGRAM TRACING: CPT | Performed by: EMERGENCY MEDICINE

## 2019-01-01 PROCEDURE — 82565 ASSAY OF CREATININE: CPT

## 2019-01-01 PROCEDURE — 2500000003 HC RX 250 WO HCPCS: Performed by: ANESTHESIOLOGY

## 2019-01-01 PROCEDURE — 1123F ACP DISCUSS/DSCN MKR DOCD: CPT | Performed by: TRANSPLANT SURGERY

## 2019-01-01 PROCEDURE — 76604 US EXAM CHEST: CPT

## 2019-01-01 PROCEDURE — 99233 SBSQ HOSP IP/OBS HIGH 50: CPT | Performed by: EMERGENCY MEDICINE

## 2019-01-01 PROCEDURE — 2580000003 HC RX 258: Performed by: NURSE ANESTHETIST, CERTIFIED REGISTERED

## 2019-01-01 PROCEDURE — 0DB60ZZ EXCISION OF STOMACH, OPEN APPROACH: ICD-10-PCS | Performed by: TRANSPLANT SURGERY

## 2019-01-01 PROCEDURE — 7100000011 HC PHASE II RECOVERY - ADDTL 15 MIN: Performed by: TRANSPLANT SURGERY

## 2019-01-01 PROCEDURE — 80202 ASSAY OF VANCOMYCIN: CPT

## 2019-01-01 PROCEDURE — 86901 BLOOD TYPING SEROLOGIC RH(D): CPT

## 2019-01-01 PROCEDURE — 45385 COLONOSCOPY W/LESION REMOVAL: CPT | Performed by: TRANSPLANT SURGERY

## 2019-01-01 PROCEDURE — 97161 PT EVAL LOW COMPLEX 20 MIN: CPT

## 2019-01-01 PROCEDURE — 4004F PT TOBACCO SCREEN RCVD TLK: CPT | Performed by: TRANSPLANT SURGERY

## 2019-01-01 PROCEDURE — 93010 ELECTROCARDIOGRAM REPORT: CPT | Performed by: INTERNAL MEDICINE

## 2019-01-01 PROCEDURE — 49905 OMENTAL FLAP INTRA-ABDOM: CPT | Performed by: TRANSPLANT SURGERY

## 2019-01-01 PROCEDURE — 86900 BLOOD TYPING SEROLOGIC ABO: CPT

## 2019-01-01 PROCEDURE — 96365 THER/PROPH/DIAG IV INF INIT: CPT

## 2019-01-01 PROCEDURE — 43239 EGD BIOPSY SINGLE/MULTIPLE: CPT | Performed by: TRANSPLANT SURGERY

## 2019-01-01 PROCEDURE — 71275 CT ANGIOGRAPHY CHEST: CPT

## 2019-01-01 PROCEDURE — 87450 HC DIRECT STREP B ANTIGEN: CPT

## 2019-01-01 PROCEDURE — 88341 IMHCHEM/IMCYTCHM EA ADD ANTB: CPT

## 2019-01-01 PROCEDURE — 3600000014 HC SURGERY LEVEL 4 ADDTL 15MIN: Performed by: TRANSPLANT SURGERY

## 2019-01-01 PROCEDURE — 43611 EXCISION OF STOMACH LESION: CPT | Performed by: TRANSPLANT SURGERY

## 2019-01-01 PROCEDURE — 0DNU0ZZ RELEASE OMENTUM, OPEN APPROACH: ICD-10-PCS | Performed by: TRANSPLANT SURGERY

## 2019-01-01 PROCEDURE — 2580000003 HC RX 258: Performed by: TRANSPLANT SURGERY

## 2019-01-01 PROCEDURE — 7100000001 HC PACU RECOVERY - ADDTL 15 MIN: Performed by: TRANSPLANT SURGERY

## 2019-01-01 PROCEDURE — 87206 SMEAR FLUORESCENT/ACID STAI: CPT

## 2019-01-01 PROCEDURE — 2500000003 HC RX 250 WO HCPCS: Performed by: INTERNAL MEDICINE

## 2019-01-01 PROCEDURE — 2709999900 HC NON-CHARGEABLE SUPPLY: Performed by: TRANSPLANT SURGERY

## 2019-01-01 PROCEDURE — 97162 PT EVAL MOD COMPLEX 30 MIN: CPT

## 2019-01-01 PROCEDURE — 97165 OT EVAL LOW COMPLEX 30 MIN: CPT

## 2019-01-01 PROCEDURE — 0DU707Z SUPPLEMENT STOMACH, PYLORUS WITH AUTOLOGOUS TISSUE SUBSTITUTE, OPEN APPROACH: ICD-10-PCS | Performed by: TRANSPLANT SURGERY

## 2019-01-01 RX ORDER — 0.9 % SODIUM CHLORIDE 0.9 %
10 VIAL (ML) INJECTION EVERY 12 HOURS SCHEDULED
Status: DISCONTINUED | OUTPATIENT
Start: 2019-01-01 | End: 2019-01-01 | Stop reason: HOSPADM

## 2019-01-01 RX ORDER — NEOSTIGMINE METHYLSULFATE 1 MG/ML
INJECTION, SOLUTION INTRAVENOUS PRN
Status: DISCONTINUED | OUTPATIENT
Start: 2019-01-01 | End: 2019-01-01 | Stop reason: SDUPTHER

## 2019-01-01 RX ORDER — PANTOPRAZOLE SODIUM 40 MG/1
40 TABLET, DELAYED RELEASE ORAL
Status: DISCONTINUED | OUTPATIENT
Start: 2019-01-01 | End: 2019-01-01

## 2019-01-01 RX ORDER — METOPROLOL TARTRATE 5 MG/5ML
5 INJECTION INTRAVENOUS EVERY 6 HOURS
Status: DISCONTINUED | OUTPATIENT
Start: 2019-01-01 | End: 2019-01-01

## 2019-01-01 RX ORDER — ONDANSETRON 2 MG/ML
INJECTION INTRAMUSCULAR; INTRAVENOUS PRN
Status: DISCONTINUED | OUTPATIENT
Start: 2019-01-01 | End: 2019-01-01 | Stop reason: SDUPTHER

## 2019-01-01 RX ORDER — OMEPRAZOLE 20 MG/1
20 CAPSULE, DELAYED RELEASE ORAL DAILY
Qty: 30 CAPSULE | Refills: 3 | Status: SHIPPED | OUTPATIENT
Start: 2019-01-01 | End: 2019-07-02

## 2019-01-01 RX ORDER — SODIUM CHLORIDE 0.9 % (FLUSH) 0.9 %
10 SYRINGE (ML) INJECTION EVERY 12 HOURS SCHEDULED
Status: DISCONTINUED | OUTPATIENT
Start: 2019-01-01 | End: 2019-01-01 | Stop reason: HOSPADM

## 2019-01-01 RX ORDER — ALBUMIN, HUMAN INJ 5% 5 %
SOLUTION INTRAVENOUS PRN
Status: DISCONTINUED | OUTPATIENT
Start: 2019-01-01 | End: 2019-01-01 | Stop reason: SDUPTHER

## 2019-01-01 RX ORDER — MEPERIDINE HYDROCHLORIDE 50 MG/ML
12.5 INJECTION INTRAMUSCULAR; INTRAVENOUS; SUBCUTANEOUS EVERY 5 MIN PRN
Status: DISCONTINUED | OUTPATIENT
Start: 2019-01-01 | End: 2019-01-01 | Stop reason: HOSPADM

## 2019-01-01 RX ORDER — LORAZEPAM 0.5 MG/1
0.5 TABLET ORAL EVERY 6 HOURS PRN
Status: DISCONTINUED | OUTPATIENT
Start: 2019-01-01 | End: 2019-01-01 | Stop reason: HOSPADM

## 2019-01-01 RX ORDER — ACETAMINOPHEN 325 MG/1
650 TABLET ORAL EVERY 4 HOURS PRN
Status: DISCONTINUED | OUTPATIENT
Start: 2019-01-01 | End: 2019-01-01

## 2019-01-01 RX ORDER — ACETAMINOPHEN 500 MG
500 TABLET ORAL EVERY 4 HOURS
Status: DISCONTINUED | OUTPATIENT
Start: 2019-01-01 | End: 2019-01-01 | Stop reason: HOSPADM

## 2019-01-01 RX ORDER — GABAPENTIN 100 MG/1
100 CAPSULE ORAL 3 TIMES DAILY
Qty: 90 CAPSULE | Refills: 3 | Status: SHIPPED | OUTPATIENT
Start: 2019-01-01 | End: 2019-01-01 | Stop reason: ALTCHOICE

## 2019-01-01 RX ORDER — MINERAL OIL 100 G/100G
1 OIL RECTAL PRN
Status: DISCONTINUED | OUTPATIENT
Start: 2019-01-01 | End: 2019-01-01 | Stop reason: HOSPADM

## 2019-01-01 RX ORDER — LIDOCAINE HYDROCHLORIDE 20 MG/ML
INJECTION, SOLUTION INFILTRATION; PERINEURAL PRN
Status: DISCONTINUED | OUTPATIENT
Start: 2019-01-01 | End: 2019-01-01 | Stop reason: SDUPTHER

## 2019-01-01 RX ORDER — 0.9 % SODIUM CHLORIDE 0.9 %
1000 INTRAVENOUS SOLUTION INTRAVENOUS ONCE
Status: COMPLETED | OUTPATIENT
Start: 2019-01-01 | End: 2019-01-01

## 2019-01-01 RX ORDER — MORPHINE SULFATE 15 MG/1
7.5 TABLET ORAL EVERY 4 HOURS PRN
Status: DISCONTINUED | OUTPATIENT
Start: 2019-01-01 | End: 2019-01-01

## 2019-01-01 RX ORDER — SODIUM CHLORIDE 0.9 % (FLUSH) 0.9 %
10 SYRINGE (ML) INJECTION PRN
Status: DISCONTINUED | OUTPATIENT
Start: 2019-01-01 | End: 2019-01-01 | Stop reason: HOSPADM

## 2019-01-01 RX ORDER — POLYETHYLENE GLYCOL 3350 17 G/17G
17 POWDER, FOR SOLUTION ORAL DAILY
Qty: 1530 G | Refills: 5 | Status: ON HOLD | OUTPATIENT
Start: 2019-01-01 | End: 2019-01-01 | Stop reason: HOSPADM

## 2019-01-01 RX ORDER — SODIUM CHLORIDE 9 MG/ML
INJECTION, SOLUTION INTRAVENOUS CONTINUOUS PRN
Status: DISCONTINUED | OUTPATIENT
Start: 2019-01-01 | End: 2019-01-01 | Stop reason: SDUPTHER

## 2019-01-01 RX ORDER — ONDANSETRON 2 MG/ML
4 INJECTION INTRAMUSCULAR; INTRAVENOUS EVERY 6 HOURS PRN
Status: DISCONTINUED | OUTPATIENT
Start: 2019-01-01 | End: 2019-01-01 | Stop reason: HOSPADM

## 2019-01-01 RX ORDER — OXYCODONE HYDROCHLORIDE 5 MG/1
5 TABLET ORAL EVERY 6 HOURS PRN
Qty: 48 TABLET | Refills: 0 | Status: SHIPPED | OUTPATIENT
Start: 2019-01-01 | End: 2019-01-01 | Stop reason: ALTCHOICE

## 2019-01-01 RX ORDER — OXYCODONE HYDROCHLORIDE 5 MG/1
5 TABLET ORAL EVERY 4 HOURS PRN
Status: DISCONTINUED | OUTPATIENT
Start: 2019-01-01 | End: 2019-01-01

## 2019-01-01 RX ORDER — ONDANSETRON 2 MG/ML
4 INJECTION INTRAMUSCULAR; INTRAVENOUS
Status: DISCONTINUED | OUTPATIENT
Start: 2019-01-01 | End: 2019-01-01 | Stop reason: HOSPADM

## 2019-01-01 RX ORDER — ACETAMINOPHEN 325 MG/1
650 TABLET ORAL EVERY 4 HOURS PRN
Status: DISCONTINUED | OUTPATIENT
Start: 2019-01-01 | End: 2019-01-01 | Stop reason: HOSPADM

## 2019-01-01 RX ORDER — PHENYLEPHRINE HYDROCHLORIDE 10 MG/ML
INJECTION INTRAVENOUS PRN
Status: DISCONTINUED | OUTPATIENT
Start: 2019-01-01 | End: 2019-01-01 | Stop reason: SDUPTHER

## 2019-01-01 RX ORDER — KETOROLAC TROMETHAMINE 30 MG/ML
15 INJECTION, SOLUTION INTRAMUSCULAR; INTRAVENOUS EVERY 6 HOURS
Status: DISCONTINUED | OUTPATIENT
Start: 2019-01-01 | End: 2019-01-01

## 2019-01-01 RX ORDER — CALCIUM CARBONATE 200(500)MG
1000 TABLET,CHEWABLE ORAL
Status: DISCONTINUED | OUTPATIENT
Start: 2019-01-01 | End: 2019-01-01 | Stop reason: HOSPADM

## 2019-01-01 RX ORDER — LIDOCAINE HYDROCHLORIDE ANHYDROUS AND DEXTROSE MONOHYDRATE .4; 5 G/100ML; G/100ML
INJECTION, SOLUTION INTRAVENOUS CONTINUOUS PRN
Status: DISCONTINUED | OUTPATIENT
Start: 2019-01-01 | End: 2019-01-01 | Stop reason: SDUPTHER

## 2019-01-01 RX ORDER — PSEUDOEPHEDRINE HCL 30 MG
100 TABLET ORAL DAILY
COMMUNITY
Start: 2019-01-01 | End: 2019-01-01 | Stop reason: ALTCHOICE

## 2019-01-01 RX ORDER — DIPHENHYDRAMINE HYDROCHLORIDE 50 MG/ML
25 INJECTION INTRAMUSCULAR; INTRAVENOUS EVERY 6 HOURS PRN
Status: DISCONTINUED | OUTPATIENT
Start: 2019-01-01 | End: 2019-01-01

## 2019-01-01 RX ORDER — POLYETHYLENE GLYCOL 3350 17 G/17G
17 POWDER, FOR SOLUTION ORAL DAILY PRN
Status: DISCONTINUED | OUTPATIENT
Start: 2019-01-01 | End: 2019-01-01 | Stop reason: HOSPADM

## 2019-01-01 RX ORDER — KETOROLAC TROMETHAMINE 30 MG/ML
15 INJECTION, SOLUTION INTRAMUSCULAR; INTRAVENOUS EVERY 6 HOURS PRN
Status: DISCONTINUED | OUTPATIENT
Start: 2019-01-01 | End: 2019-01-01 | Stop reason: HOSPADM

## 2019-01-01 RX ORDER — METOPROLOL SUCCINATE 25 MG/1
25 TABLET, EXTENDED RELEASE ORAL DAILY
Status: DISCONTINUED | OUTPATIENT
Start: 2019-01-01 | End: 2019-01-01 | Stop reason: HOSPADM

## 2019-01-01 RX ORDER — SODIUM CHLORIDE, SODIUM LACTATE, POTASSIUM CHLORIDE, CALCIUM CHLORIDE 600; 310; 30; 20 MG/100ML; MG/100ML; MG/100ML; MG/100ML
INJECTION, SOLUTION INTRAVENOUS CONTINUOUS
Status: DISCONTINUED | OUTPATIENT
Start: 2019-01-01 | End: 2019-01-01

## 2019-01-01 RX ORDER — METOPROLOL SUCCINATE 50 MG/1
50 TABLET, EXTENDED RELEASE ORAL DAILY
Status: DISCONTINUED | OUTPATIENT
Start: 2019-01-01 | End: 2019-01-01

## 2019-01-01 RX ORDER — SODIUM CHLORIDE 0.9 % (FLUSH) 0.9 %
10 SYRINGE (ML) INJECTION PRN
Status: DISCONTINUED | OUTPATIENT
Start: 2019-01-01 | End: 2019-01-01 | Stop reason: SDUPTHER

## 2019-01-01 RX ORDER — CALCIUM CARBONATE 200(500)MG
500 TABLET,CHEWABLE ORAL 3 TIMES DAILY PRN
Status: DISCONTINUED | OUTPATIENT
Start: 2019-01-01 | End: 2019-01-01 | Stop reason: HOSPADM

## 2019-01-01 RX ORDER — AMLODIPINE BESYLATE 2.5 MG/1
2.5 TABLET ORAL DAILY
Status: DISCONTINUED | OUTPATIENT
Start: 2019-01-01 | End: 2019-01-01 | Stop reason: HOSPADM

## 2019-01-01 RX ORDER — LORAZEPAM 0.5 MG/1
0.5 TABLET ORAL EVERY 6 HOURS PRN
Qty: 45 TABLET | Refills: 0 | Status: SHIPPED | OUTPATIENT
Start: 2019-01-01 | End: 2019-06-16

## 2019-01-01 RX ORDER — MOXIFLOXACIN HYDROCHLORIDE 400 MG/1
400 TABLET ORAL DAILY
Qty: 10 TABLET | Refills: 0 | Status: SHIPPED | OUTPATIENT
Start: 2019-01-01 | End: 2019-01-01

## 2019-01-01 RX ORDER — NICOTINE POLACRILEX 4 MG
15 LOZENGE BUCCAL PRN
Status: DISCONTINUED | OUTPATIENT
Start: 2019-01-01 | End: 2019-01-01 | Stop reason: HOSPADM

## 2019-01-01 RX ORDER — SODIUM CHLORIDE 0.9 % (FLUSH) 0.9 %
10 SYRINGE (ML) INJECTION
Status: DISCONTINUED | OUTPATIENT
Start: 2019-01-01 | End: 2019-01-01 | Stop reason: SDUPTHER

## 2019-01-01 RX ORDER — OXYCODONE HYDROCHLORIDE AND ACETAMINOPHEN 5; 325 MG/1; MG/1
1 TABLET ORAL ONCE
Status: COMPLETED | OUTPATIENT
Start: 2019-01-01 | End: 2019-01-01

## 2019-01-01 RX ORDER — DOCUSATE SODIUM 100 MG/1
100 CAPSULE, LIQUID FILLED ORAL DAILY
Status: DISCONTINUED | OUTPATIENT
Start: 2019-01-01 | End: 2019-01-01 | Stop reason: HOSPADM

## 2019-01-01 RX ORDER — OXYCODONE HYDROCHLORIDE AND ACETAMINOPHEN 5; 325 MG/1; MG/1
1 TABLET ORAL EVERY 4 HOURS PRN
Qty: 42 TABLET | Refills: 0 | Status: SHIPPED | OUTPATIENT
Start: 2019-01-01 | End: 2019-01-01

## 2019-01-01 RX ORDER — BUSPIRONE HYDROCHLORIDE 10 MG/1
10 TABLET ORAL 3 TIMES DAILY
Status: DISCONTINUED | OUTPATIENT
Start: 2019-01-01 | End: 2019-01-01 | Stop reason: HOSPADM

## 2019-01-01 RX ORDER — PANTOPRAZOLE SODIUM 40 MG/10ML
40 INJECTION, POWDER, LYOPHILIZED, FOR SOLUTION INTRAVENOUS DAILY
Status: DISCONTINUED | OUTPATIENT
Start: 2019-01-01 | End: 2019-01-01 | Stop reason: HOSPADM

## 2019-01-01 RX ORDER — BUSPIRONE HYDROCHLORIDE 10 MG/1
10 TABLET ORAL 3 TIMES DAILY
Qty: 90 TABLET | Refills: 1 | Status: SHIPPED | OUTPATIENT
Start: 2019-01-01

## 2019-01-01 RX ORDER — SENNA AND DOCUSATE SODIUM 50; 8.6 MG/1; MG/1
2 TABLET, FILM COATED ORAL DAILY PRN
Status: DISCONTINUED | OUTPATIENT
Start: 2019-01-01 | End: 2019-01-01 | Stop reason: HOSPADM

## 2019-01-01 RX ORDER — IPRATROPIUM BROMIDE AND ALBUTEROL SULFATE 2.5; .5 MG/3ML; MG/3ML
1 SOLUTION RESPIRATORY (INHALATION)
Status: DISCONTINUED | OUTPATIENT
Start: 2019-01-01 | End: 2019-01-01 | Stop reason: HOSPADM

## 2019-01-01 RX ORDER — AMOXICILLIN AND CLAVULANATE POTASSIUM 875; 125 MG/1; MG/1
1 TABLET, FILM COATED ORAL 2 TIMES DAILY
Qty: 20 TABLET | Refills: 0 | Status: SHIPPED | OUTPATIENT
Start: 2019-01-01 | End: 2019-01-01 | Stop reason: ALTCHOICE

## 2019-01-01 RX ORDER — DIPHENHYDRAMINE HYDROCHLORIDE 50 MG/ML
12.5 INJECTION INTRAMUSCULAR; INTRAVENOUS
Status: DISCONTINUED | OUTPATIENT
Start: 2019-01-01 | End: 2019-01-01 | Stop reason: HOSPADM

## 2019-01-01 RX ORDER — METHYLPREDNISOLONE SODIUM SUCCINATE 125 MG/2ML
80 INJECTION, POWDER, LYOPHILIZED, FOR SOLUTION INTRAMUSCULAR; INTRAVENOUS DAILY
Status: DISCONTINUED | OUTPATIENT
Start: 2019-01-01 | End: 2019-01-01 | Stop reason: HOSPADM

## 2019-01-01 RX ORDER — ALBUTEROL SULFATE 2.5 MG/3ML
2.5 SOLUTION RESPIRATORY (INHALATION) EVERY 4 HOURS PRN
Qty: 120 EACH | Refills: 3 | Status: SHIPPED | OUTPATIENT
Start: 2019-01-01

## 2019-01-01 RX ORDER — ALBUTEROL SULFATE 2.5 MG/3ML
2.5 SOLUTION RESPIRATORY (INHALATION)
Status: DISCONTINUED | OUTPATIENT
Start: 2019-01-01 | End: 2019-01-01 | Stop reason: HOSPADM

## 2019-01-01 RX ORDER — ALBUTEROL SULFATE 2.5 MG/3ML
2.5 SOLUTION RESPIRATORY (INHALATION) 4 TIMES DAILY
Status: DISCONTINUED | OUTPATIENT
Start: 2019-01-01 | End: 2019-01-01 | Stop reason: SDUPTHER

## 2019-01-01 RX ORDER — ASCORBIC ACID 1000 MG
1 TABLET ORAL DAILY
Status: DISCONTINUED | OUTPATIENT
Start: 2019-01-01 | End: 2019-01-01 | Stop reason: CLARIF

## 2019-01-01 RX ORDER — SODIUM CHLORIDE 9 MG/ML
INJECTION, SOLUTION INTRAVENOUS CONTINUOUS
Status: DISCONTINUED | OUTPATIENT
Start: 2019-01-01 | End: 2019-01-01

## 2019-01-01 RX ORDER — DORZOLAMIDE HCL 20 MG/ML
1 SOLUTION/ DROPS OPHTHALMIC 2 TIMES DAILY
Status: DISCONTINUED | OUTPATIENT
Start: 2019-01-01 | End: 2019-01-01 | Stop reason: HOSPADM

## 2019-01-01 RX ORDER — GABAPENTIN 100 MG/1
100 CAPSULE ORAL 3 TIMES DAILY
Status: DISCONTINUED | OUTPATIENT
Start: 2019-01-01 | End: 2019-01-01 | Stop reason: HOSPADM

## 2019-01-01 RX ORDER — SODIUM PHOSPHATE, DIBASIC AND SODIUM PHOSPHATE, MONOBASIC 7; 19 G/133ML; G/133ML
1 ENEMA RECTAL ONCE
Status: COMPLETED | OUTPATIENT
Start: 2019-01-01 | End: 2019-01-01

## 2019-01-01 RX ORDER — POLYVINYL ALCOHOL 14 MG/ML
1 SOLUTION/ DROPS OPHTHALMIC PRN
Status: DISCONTINUED | OUTPATIENT
Start: 2019-01-01 | End: 2019-01-01 | Stop reason: HOSPADM

## 2019-01-01 RX ORDER — 0.9 % SODIUM CHLORIDE 0.9 %
500 INTRAVENOUS SOLUTION INTRAVENOUS ONCE
Status: COMPLETED | OUTPATIENT
Start: 2019-01-01 | End: 2019-01-01

## 2019-01-01 RX ORDER — 0.9 % SODIUM CHLORIDE 0.9 %
10 VIAL (ML) INJECTION PRN
Status: DISCONTINUED | OUTPATIENT
Start: 2019-01-01 | End: 2019-01-01 | Stop reason: HOSPADM

## 2019-01-01 RX ORDER — AMLODIPINE BESYLATE 2.5 MG/1
2.5 TABLET ORAL DAILY
Status: DISCONTINUED | OUTPATIENT
Start: 2019-01-01 | End: 2019-01-01

## 2019-01-01 RX ORDER — PANTOPRAZOLE SODIUM 40 MG/1
40 TABLET, DELAYED RELEASE ORAL
Qty: 30 TABLET | Refills: 0 | Status: SHIPPED | OUTPATIENT
Start: 2019-01-01 | End: 2019-01-01

## 2019-01-01 RX ORDER — POLYETHYLENE GLYCOL 3350 17 G/17G
17 POWDER, FOR SOLUTION ORAL DAILY PRN
Qty: 527 G | Refills: 1 | Status: SHIPPED | OUTPATIENT
Start: 2019-01-01 | End: 2019-01-01 | Stop reason: ALTCHOICE

## 2019-01-01 RX ORDER — METHYLPREDNISOLONE SODIUM SUCCINATE 125 MG/2ML
80 INJECTION, POWDER, LYOPHILIZED, FOR SOLUTION INTRAMUSCULAR; INTRAVENOUS ONCE
Status: COMPLETED | OUTPATIENT
Start: 2019-01-01 | End: 2019-01-01

## 2019-01-01 RX ORDER — MORPHINE SULFATE 10 MG/ML
INJECTION, SOLUTION INTRAMUSCULAR; INTRAVENOUS PRN
Status: DISCONTINUED | OUTPATIENT
Start: 2019-01-01 | End: 2019-01-01 | Stop reason: SDUPTHER

## 2019-01-01 RX ORDER — GLYCOPYRROLATE 1 MG/5 ML
SYRINGE (ML) INTRAVENOUS PRN
Status: DISCONTINUED | OUTPATIENT
Start: 2019-01-01 | End: 2019-01-01 | Stop reason: SDUPTHER

## 2019-01-01 RX ORDER — SUCCINYLCHOLINE CHLORIDE 20 MG/ML
INJECTION INTRAMUSCULAR; INTRAVENOUS PRN
Status: DISCONTINUED | OUTPATIENT
Start: 2019-01-01 | End: 2019-01-01 | Stop reason: SDUPTHER

## 2019-01-01 RX ORDER — VECURONIUM BROMIDE 1 MG/ML
INJECTION, POWDER, LYOPHILIZED, FOR SOLUTION INTRAVENOUS PRN
Status: DISCONTINUED | OUTPATIENT
Start: 2019-01-01 | End: 2019-01-01 | Stop reason: SDUPTHER

## 2019-01-01 RX ORDER — OMEPRAZOLE 20 MG/1
20 CAPSULE, DELAYED RELEASE ORAL DAILY
Status: DISCONTINUED | OUTPATIENT
Start: 2019-01-01 | End: 2019-01-01 | Stop reason: HOSPADM

## 2019-01-01 RX ORDER — FENTANYL CITRATE 50 UG/ML
25 INJECTION, SOLUTION INTRAMUSCULAR; INTRAVENOUS EVERY 5 MIN PRN
Status: DISCONTINUED | OUTPATIENT
Start: 2019-01-01 | End: 2019-01-01 | Stop reason: HOSPADM

## 2019-01-01 RX ORDER — OXYCODONE HYDROCHLORIDE AND ACETAMINOPHEN 5; 325 MG/1; MG/1
1 TABLET ORAL EVERY 4 HOURS PRN
Status: DISCONTINUED | OUTPATIENT
Start: 2019-01-01 | End: 2019-01-01 | Stop reason: HOSPADM

## 2019-01-01 RX ORDER — NALOXONE HYDROCHLORIDE 0.4 MG/ML
0.4 INJECTION, SOLUTION INTRAMUSCULAR; INTRAVENOUS; SUBCUTANEOUS PRN
Status: DISCONTINUED | OUTPATIENT
Start: 2019-01-01 | End: 2019-01-01

## 2019-01-01 RX ORDER — SENNA AND DOCUSATE SODIUM 50; 8.6 MG/1; MG/1
2 TABLET, FILM COATED ORAL DAILY
Status: DISCONTINUED | OUTPATIENT
Start: 2019-01-01 | End: 2019-01-01 | Stop reason: HOSPADM

## 2019-01-01 RX ORDER — FENTANYL CITRATE 50 UG/ML
INJECTION, SOLUTION INTRAMUSCULAR; INTRAVENOUS PRN
Status: DISCONTINUED | OUTPATIENT
Start: 2019-01-01 | End: 2019-01-01 | Stop reason: SDUPTHER

## 2019-01-01 RX ORDER — FENTANYL CITRATE 50 UG/ML
50 INJECTION, SOLUTION INTRAMUSCULAR; INTRAVENOUS EVERY 5 MIN PRN
Status: DISCONTINUED | OUTPATIENT
Start: 2019-01-01 | End: 2019-01-01 | Stop reason: HOSPADM

## 2019-01-01 RX ORDER — LABETALOL HYDROCHLORIDE 5 MG/ML
5 INJECTION, SOLUTION INTRAVENOUS EVERY 10 MIN PRN
Status: DISCONTINUED | OUTPATIENT
Start: 2019-01-01 | End: 2019-01-01 | Stop reason: HOSPADM

## 2019-01-01 RX ORDER — SODIUM CHLORIDE 9 MG/ML
INJECTION, SOLUTION INTRAVENOUS CONTINUOUS
Status: DISCONTINUED | OUTPATIENT
Start: 2019-01-01 | End: 2019-01-01 | Stop reason: HOSPADM

## 2019-01-01 RX ORDER — DIPHENHYDRAMINE HYDROCHLORIDE 50 MG/ML
25 INJECTION INTRAMUSCULAR; INTRAVENOUS ONCE
Status: COMPLETED | OUTPATIENT
Start: 2019-01-01 | End: 2019-01-01

## 2019-01-01 RX ORDER — TRIAMTERENE AND HYDROCHLOROTHIAZIDE 37.5; 25 MG/1; MG/1
1 TABLET ORAL DAILY
Status: ON HOLD | COMMUNITY
End: 2019-01-01 | Stop reason: HOSPADM

## 2019-01-01 RX ORDER — OXYCODONE HYDROCHLORIDE 5 MG/1
5 TABLET ORAL EVERY 6 HOURS PRN
Qty: 28 TABLET | Refills: 0 | Status: SHIPPED | OUTPATIENT
Start: 2019-01-01 | End: 2019-01-01

## 2019-01-01 RX ORDER — ALBUTEROL SULFATE 90 UG/1
2 AEROSOL, METERED RESPIRATORY (INHALATION) EVERY 6 HOURS PRN
Qty: 1 INHALER | Refills: 0 | Status: SHIPPED | OUTPATIENT
Start: 2019-01-01 | End: 2019-01-01

## 2019-01-01 RX ORDER — FUROSEMIDE 10 MG/ML
20 INJECTION INTRAMUSCULAR; INTRAVENOUS ONCE
Status: COMPLETED | OUTPATIENT
Start: 2019-01-01 | End: 2019-01-01

## 2019-01-01 RX ORDER — SODIUM CHLORIDE 0.9 % (FLUSH) 0.9 %
10 SYRINGE (ML) INJECTION EVERY 12 HOURS SCHEDULED
Status: DISCONTINUED | OUTPATIENT
Start: 2019-01-01 | End: 2019-01-01 | Stop reason: SDUPTHER

## 2019-01-01 RX ORDER — DEXTROSE MONOHYDRATE 50 MG/ML
100 INJECTION, SOLUTION INTRAVENOUS PRN
Status: DISCONTINUED | OUTPATIENT
Start: 2019-01-01 | End: 2019-01-01 | Stop reason: HOSPADM

## 2019-01-01 RX ORDER — IPRATROPIUM BROMIDE AND ALBUTEROL SULFATE 2.5; .5 MG/3ML; MG/3ML
1 SOLUTION RESPIRATORY (INHALATION) ONCE
Status: COMPLETED | OUTPATIENT
Start: 2019-01-01 | End: 2019-01-01

## 2019-01-01 RX ORDER — 0.9 % SODIUM CHLORIDE 0.9 %
250 INTRAVENOUS SOLUTION INTRAVENOUS ONCE
Status: DISCONTINUED | OUTPATIENT
Start: 2019-01-01 | End: 2019-01-01

## 2019-01-01 RX ORDER — POLYETHYLENE GLYCOL 3350 17 G/17G
17 POWDER, FOR SOLUTION ORAL DAILY
Qty: 510 G | Refills: 5 | Status: SHIPPED | OUTPATIENT
Start: 2019-01-01 | End: 2019-01-01

## 2019-01-01 RX ORDER — EPHEDRINE SULFATE 50 MG/ML
INJECTION INTRAVENOUS PRN
Status: DISCONTINUED | OUTPATIENT
Start: 2019-01-01 | End: 2019-01-01 | Stop reason: SDUPTHER

## 2019-01-01 RX ORDER — DOXYCYCLINE HYCLATE 100 MG
100 TABLET ORAL 2 TIMES DAILY
Qty: 20 TABLET | Refills: 0 | Status: SHIPPED | OUTPATIENT
Start: 2019-01-01 | End: 2019-01-01 | Stop reason: ALTCHOICE

## 2019-01-01 RX ORDER — TRIAMTERENE AND HYDROCHLOROTHIAZIDE 37.5; 25 MG/1; MG/1
1 TABLET ORAL DAILY
Status: DISCONTINUED | OUTPATIENT
Start: 2019-01-01 | End: 2019-01-01 | Stop reason: HOSPADM

## 2019-01-01 RX ORDER — OXYCODONE HYDROCHLORIDE 5 MG/1
5 TABLET ORAL EVERY 4 HOURS PRN
Status: DISCONTINUED | OUTPATIENT
Start: 2019-01-01 | End: 2019-01-01 | Stop reason: HOSPADM

## 2019-01-01 RX ORDER — OXYCODONE HYDROCHLORIDE 10 MG/1
10 TABLET ORAL EVERY 4 HOURS PRN
Status: DISCONTINUED | OUTPATIENT
Start: 2019-01-01 | End: 2019-01-01

## 2019-01-01 RX ORDER — PROPOFOL 10 MG/ML
INJECTION, EMULSION INTRAVENOUS PRN
Status: DISCONTINUED | OUTPATIENT
Start: 2019-01-01 | End: 2019-01-01 | Stop reason: SDUPTHER

## 2019-01-01 RX ORDER — DEXAMETHASONE SODIUM PHOSPHATE 10 MG/ML
INJECTION, SOLUTION INTRAMUSCULAR; INTRAVENOUS PRN
Status: DISCONTINUED | OUTPATIENT
Start: 2019-01-01 | End: 2019-01-01 | Stop reason: SDUPTHER

## 2019-01-01 RX ORDER — HEPARIN SODIUM 10000 [USP'U]/ML
5000 INJECTION, SOLUTION INTRAVENOUS; SUBCUTANEOUS EVERY 8 HOURS SCHEDULED
Status: DISCONTINUED | OUTPATIENT
Start: 2019-01-01 | End: 2019-01-01 | Stop reason: HOSPADM

## 2019-01-01 RX ORDER — 0.9 % SODIUM CHLORIDE 0.9 %
10 VIAL (ML) INJECTION DAILY
Status: DISCONTINUED | OUTPATIENT
Start: 2019-01-01 | End: 2019-01-01 | Stop reason: HOSPADM

## 2019-01-01 RX ORDER — DEXTROSE MONOHYDRATE 25 G/50ML
12.5 INJECTION, SOLUTION INTRAVENOUS PRN
Status: DISCONTINUED | OUTPATIENT
Start: 2019-01-01 | End: 2019-01-01 | Stop reason: HOSPADM

## 2019-01-01 RX ORDER — SENNA AND DOCUSATE SODIUM 50; 8.6 MG/1; MG/1
2 TABLET, FILM COATED ORAL DAILY PRN
Qty: 50 TABLET | Refills: 0 | Status: SHIPPED | OUTPATIENT
Start: 2019-01-01 | End: 2019-01-01 | Stop reason: ALTCHOICE

## 2019-01-01 RX ORDER — LATANOPROST 50 UG/ML
1 SOLUTION/ DROPS OPHTHALMIC NIGHTLY
Status: DISCONTINUED | OUTPATIENT
Start: 2019-01-01 | End: 2019-01-01 | Stop reason: HOSPADM

## 2019-01-01 RX ORDER — OXYCODONE HYDROCHLORIDE AND ACETAMINOPHEN 5; 325 MG/1; MG/1
1 TABLET ORAL EVERY 6 HOURS PRN
Status: ON HOLD | COMMUNITY
End: 2019-01-01 | Stop reason: HOSPADM

## 2019-01-01 RX ORDER — MIDAZOLAM HYDROCHLORIDE 1 MG/ML
INJECTION INTRAMUSCULAR; INTRAVENOUS PRN
Status: DISCONTINUED | OUTPATIENT
Start: 2019-01-01 | End: 2019-01-01 | Stop reason: SDUPTHER

## 2019-01-01 RX ORDER — DEXTROSE, SODIUM CHLORIDE, AND POTASSIUM CHLORIDE 5; .45; .15 G/100ML; G/100ML; G/100ML
INJECTION INTRAVENOUS CONTINUOUS
Status: DISCONTINUED | OUTPATIENT
Start: 2019-01-01 | End: 2019-01-01

## 2019-01-01 RX ORDER — ONDANSETRON 2 MG/ML
4 INJECTION INTRAMUSCULAR; INTRAVENOUS EVERY 6 HOURS PRN
Status: DISCONTINUED | OUTPATIENT
Start: 2019-01-01 | End: 2019-01-01 | Stop reason: SDUPTHER

## 2019-01-01 RX ADMIN — ALBUTEROL SULFATE 2.5 MG: 2.5 SOLUTION RESPIRATORY (INHALATION) at 17:35

## 2019-01-01 RX ADMIN — BUSPIRONE HYDROCHLORIDE 10 MG: 10 TABLET ORAL at 08:36

## 2019-01-01 RX ADMIN — DOCUSATE SODIUM 100 MG: 100 CAPSULE, LIQUID FILLED ORAL at 21:52

## 2019-01-01 RX ADMIN — METOPROLOL TARTRATE 5 MG: 5 INJECTION, SOLUTION INTRAVENOUS at 02:55

## 2019-01-01 RX ADMIN — IPRATROPIUM BROMIDE AND ALBUTEROL SULFATE 1 AMPULE: .5; 3 SOLUTION RESPIRATORY (INHALATION) at 12:55

## 2019-01-01 RX ADMIN — PROPOFOL 50 MG: 10 INJECTION, EMULSION INTRAVENOUS at 08:47

## 2019-01-01 RX ADMIN — DORZOLAMIDE HYDROCHLORIDE 1 DROP: 20 SOLUTION/ DROPS OPHTHALMIC at 08:57

## 2019-01-01 RX ADMIN — KETOROLAC TROMETHAMINE 15 MG: 30 INJECTION, SOLUTION INTRAMUSCULAR at 08:50

## 2019-01-01 RX ADMIN — ENOXAPARIN SODIUM 40 MG: 40 INJECTION SUBCUTANEOUS at 08:36

## 2019-01-01 RX ADMIN — IPRATROPIUM BROMIDE AND ALBUTEROL SULFATE 1 AMPULE: .5; 3 SOLUTION RESPIRATORY (INHALATION) at 08:23

## 2019-01-01 RX ADMIN — ACETAMINOPHEN 650 MG: 325 TABLET ORAL at 14:13

## 2019-01-01 RX ADMIN — KETOROLAC TROMETHAMINE 15 MG: 30 INJECTION, SOLUTION INTRAMUSCULAR at 20:59

## 2019-01-01 RX ADMIN — SODIUM CHLORIDE, POTASSIUM CHLORIDE, SODIUM LACTATE AND CALCIUM CHLORIDE: 600; 310; 30; 20 INJECTION, SOLUTION INTRAVENOUS at 23:46

## 2019-01-01 RX ADMIN — AMLODIPINE BESYLATE 2.5 MG: 2.5 TABLET ORAL at 09:35

## 2019-01-01 RX ADMIN — IPRATROPIUM BROMIDE AND ALBUTEROL SULFATE 1 AMPULE: .5; 3 SOLUTION RESPIRATORY (INHALATION) at 08:43

## 2019-01-01 RX ADMIN — IPRATROPIUM BROMIDE AND ALBUTEROL SULFATE 1 AMPULE: .5; 3 SOLUTION RESPIRATORY (INHALATION) at 20:49

## 2019-01-01 RX ADMIN — MORPHINE SULFATE 2 MG: 10 INJECTION INTRAVENOUS at 10:52

## 2019-01-01 RX ADMIN — Medication 10 ML: at 09:55

## 2019-01-01 RX ADMIN — Medication 10 ML: at 10:19

## 2019-01-01 RX ADMIN — DORZOLAMIDE HYDROCHLORIDE 1 DROP: 20 SOLUTION/ DROPS OPHTHALMIC at 21:01

## 2019-01-01 RX ADMIN — METOPROLOL SUCCINATE 25 MG: 50 TABLET, EXTENDED RELEASE ORAL at 11:54

## 2019-01-01 RX ADMIN — DORZOLAMIDE HYDROCHLORIDE 1 DROP: 20 SOLUTION/ DROPS OPHTHALMIC at 22:39

## 2019-01-01 RX ADMIN — Medication 10 ML: at 10:18

## 2019-01-01 RX ADMIN — OXYCODONE HYDROCHLORIDE AND ACETAMINOPHEN 1 TABLET: 5; 325 TABLET ORAL at 01:14

## 2019-01-01 RX ADMIN — VANCOMYCIN HYDROCHLORIDE 1.5 G: 10 INJECTION, POWDER, LYOPHILIZED, FOR SOLUTION INTRAVENOUS at 09:35

## 2019-01-01 RX ADMIN — SODIUM CHLORIDE: 9 INJECTION, SOLUTION INTRAVENOUS at 09:55

## 2019-01-01 RX ADMIN — OXYCODONE HYDROCHLORIDE 5 MG: 5 TABLET ORAL at 12:09

## 2019-01-01 RX ADMIN — SODIUM CHLORIDE, POTASSIUM CHLORIDE, SODIUM LACTATE AND CALCIUM CHLORIDE: 600; 310; 30; 20 INJECTION, SOLUTION INTRAVENOUS at 10:59

## 2019-01-01 RX ADMIN — Medication 3 MG: at 10:50

## 2019-01-01 RX ADMIN — VECURONIUM BROMIDE FOR INJECTION 5 MG: 1 INJECTION, POWDER, LYOPHILIZED, FOR SOLUTION INTRAVENOUS at 07:43

## 2019-01-01 RX ADMIN — DORZOLAMIDE HYDROCHLORIDE 1 DROP: 20 SOLUTION/ DROPS OPHTHALMIC at 09:32

## 2019-01-01 RX ADMIN — HEPARIN SODIUM 5000 UNITS: 10000 INJECTION INTRAVENOUS; SUBCUTANEOUS at 05:55

## 2019-01-01 RX ADMIN — HEPARIN SODIUM 5000 UNITS: 10000 INJECTION INTRAVENOUS; SUBCUTANEOUS at 14:15

## 2019-01-01 RX ADMIN — PROPOFOL 50 MG: 10 INJECTION, EMULSION INTRAVENOUS at 08:55

## 2019-01-01 RX ADMIN — EPHEDRINE SULFATE 5 MG: 50 INJECTION, SOLUTION INTRAVENOUS at 08:23

## 2019-01-01 RX ADMIN — Medication 10 ML: at 15:44

## 2019-01-01 RX ADMIN — OMEPRAZOLE 20 MG: 20 CAPSULE, DELAYED RELEASE ORAL at 08:37

## 2019-01-01 RX ADMIN — MIDAZOLAM HYDROCHLORIDE 2 MG: 1 INJECTION, SOLUTION INTRAMUSCULAR; INTRAVENOUS at 07:28

## 2019-01-01 RX ADMIN — VECURONIUM BROMIDE FOR INJECTION 1 MG: 1 INJECTION, POWDER, LYOPHILIZED, FOR SOLUTION INTRAVENOUS at 09:49

## 2019-01-01 RX ADMIN — Medication 10 ML: at 08:41

## 2019-01-01 RX ADMIN — CALCIUM CARBONATE (ANTACID) CHEW TAB 500 MG 1000 MG: 500 CHEW TAB at 21:02

## 2019-01-01 RX ADMIN — PROPOFOL 200 MG: 10 INJECTION, EMULSION INTRAVENOUS at 07:32

## 2019-01-01 RX ADMIN — ENOXAPARIN SODIUM 40 MG: 40 INJECTION SUBCUTANEOUS at 08:59

## 2019-01-01 RX ADMIN — LATANOPROST 1 DROP: 50 SOLUTION OPHTHALMIC at 20:54

## 2019-01-01 RX ADMIN — LATANOPROST 1 DROP: 50 SOLUTION OPHTHALMIC at 21:29

## 2019-01-01 RX ADMIN — METOPROLOL SUCCINATE 50 MG: 50 TABLET, EXTENDED RELEASE ORAL at 08:49

## 2019-01-01 RX ADMIN — DORZOLAMIDE HCL 1 DROP: 20 SOLUTION/ DROPS OPHTHALMIC at 10:47

## 2019-01-01 RX ADMIN — SODIUM CHLORIDE: 9 INJECTION, SOLUTION INTRAVENOUS at 21:01

## 2019-01-01 RX ADMIN — HEPARIN SODIUM 5000 UNITS: 10000 INJECTION INTRAVENOUS; SUBCUTANEOUS at 21:43

## 2019-01-01 RX ADMIN — VANCOMYCIN HYDROCHLORIDE 1750 MG: 10 INJECTION, POWDER, LYOPHILIZED, FOR SOLUTION INTRAVENOUS at 01:15

## 2019-01-01 RX ADMIN — Medication 0.6 MG: at 10:50

## 2019-01-01 RX ADMIN — METOPROLOL SUCCINATE 50 MG: 50 TABLET, EXTENDED RELEASE ORAL at 08:57

## 2019-01-01 RX ADMIN — PROPOFOL 30 MG: 10 INJECTION, EMULSION INTRAVENOUS at 09:20

## 2019-01-01 RX ADMIN — ACETAMINOPHEN 500 MG: 500 TABLET ORAL at 11:19

## 2019-01-01 RX ADMIN — DOCUSATE SODIUM 100 MG: 100 CAPSULE, LIQUID FILLED ORAL at 08:57

## 2019-01-01 RX ADMIN — ALBUMIN (HUMAN) 500 ML: 12.5 INJECTION, SOLUTION INTRAVENOUS at 08:14

## 2019-01-01 RX ADMIN — LATANOPROST 1 DROP: 50 SOLUTION OPHTHALMIC at 22:17

## 2019-01-01 RX ADMIN — ACETAMINOPHEN 500 MG: 500 TABLET ORAL at 18:36

## 2019-01-01 RX ADMIN — ALBUTEROL SULFATE 2.5 MG: 2.5 SOLUTION RESPIRATORY (INHALATION) at 12:16

## 2019-01-01 RX ADMIN — CEFTRIAXONE SODIUM 1 G: 1 INJECTION, POWDER, FOR SOLUTION INTRAMUSCULAR; INTRAVENOUS at 01:57

## 2019-01-01 RX ADMIN — DEXAMETHASONE SODIUM PHOSPHATE 10 MG: 10 INJECTION INTRAMUSCULAR; INTRAVENOUS at 07:35

## 2019-01-01 RX ADMIN — HEPARIN SODIUM 5000 UNITS: 10000 INJECTION INTRAVENOUS; SUBCUTANEOUS at 20:24

## 2019-01-01 RX ADMIN — METOPROLOL SUCCINATE 50 MG: 50 TABLET, EXTENDED RELEASE ORAL at 09:30

## 2019-01-01 RX ADMIN — HEPARIN SODIUM 5000 UNITS: 10000 INJECTION INTRAVENOUS; SUBCUTANEOUS at 22:16

## 2019-01-01 RX ADMIN — MORPHINE SULFATE 2 MG: 10 INJECTION INTRAVENOUS at 11:17

## 2019-01-01 RX ADMIN — ENOXAPARIN SODIUM 40 MG: 40 INJECTION SUBCUTANEOUS at 09:04

## 2019-01-01 RX ADMIN — Medication 10 ML: at 09:05

## 2019-01-01 RX ADMIN — HEPARIN SODIUM 5000 UNITS: 10000 INJECTION INTRAVENOUS; SUBCUTANEOUS at 21:24

## 2019-01-01 RX ADMIN — PANTOPRAZOLE SODIUM 40 MG: 40 INJECTION, POWDER, FOR SOLUTION INTRAVENOUS at 08:57

## 2019-01-01 RX ADMIN — ACETAMINOPHEN 650 MG: 325 TABLET, FILM COATED ORAL at 21:29

## 2019-01-01 RX ADMIN — KETOROLAC TROMETHAMINE 15 MG: 30 INJECTION, SOLUTION INTRAMUSCULAR at 15:00

## 2019-01-01 RX ADMIN — CALCIUM CARBONATE (ANTACID) CHEW TAB 500 MG 1000 MG: 500 CHEW TAB at 14:41

## 2019-01-01 RX ADMIN — SODIUM PHOSPHATE, DIBASIC AND SODIUM PHOSPHATE, MONOBASIC 1 ENEMA: 7; 19 ENEMA RECTAL at 18:05

## 2019-01-01 RX ADMIN — VANCOMYCIN HYDROCHLORIDE 1.5 G: 10 INJECTION, POWDER, LYOPHILIZED, FOR SOLUTION INTRAVENOUS at 00:42

## 2019-01-01 RX ADMIN — SODIUM CHLORIDE, POTASSIUM CHLORIDE, SODIUM LACTATE AND CALCIUM CHLORIDE: 600; 310; 30; 20 INJECTION, SOLUTION INTRAVENOUS at 15:33

## 2019-01-01 RX ADMIN — LIDOCAINE HYDROCHLORIDE ANHYDROUS AND DEXTROSE MONOHYDRATE 2 MG/MIN: .4; 5 INJECTION, SOLUTION INTRAVENOUS at 07:48

## 2019-01-01 RX ADMIN — OMEPRAZOLE 20 MG: 20 CAPSULE, DELAYED RELEASE ORAL at 17:53

## 2019-01-01 RX ADMIN — LATANOPROST 1 DROP: 50 SOLUTION/ DROPS OPHTHALMIC at 20:26

## 2019-01-01 RX ADMIN — Medication 0.2 MG: at 08:24

## 2019-01-01 RX ADMIN — KETOROLAC TROMETHAMINE 15 MG: 30 INJECTION, SOLUTION INTRAMUSCULAR at 22:16

## 2019-01-01 RX ADMIN — DORZOLAMIDE HYDROCHLORIDE 1 DROP: 20 SOLUTION/ DROPS OPHTHALMIC at 10:20

## 2019-01-01 RX ADMIN — HEPARIN SODIUM 5000 UNITS: 10000 INJECTION INTRAVENOUS; SUBCUTANEOUS at 21:07

## 2019-01-01 RX ADMIN — PANTOPRAZOLE SODIUM 40 MG: 40 INJECTION, POWDER, FOR SOLUTION INTRAVENOUS at 10:52

## 2019-01-01 RX ADMIN — PROPOFOL 200 MG: 10 INJECTION, EMULSION INTRAVENOUS at 08:41

## 2019-01-01 RX ADMIN — SODIUM CHLORIDE: 9 INJECTION, SOLUTION INTRAVENOUS at 07:28

## 2019-01-01 RX ADMIN — SODIUM CHLORIDE: 9 INJECTION, SOLUTION INTRAVENOUS at 21:30

## 2019-01-01 RX ADMIN — DEXTROSE, SODIUM CHLORIDE, AND POTASSIUM CHLORIDE: 5; .45; .15 INJECTION INTRAVENOUS at 09:06

## 2019-01-01 RX ADMIN — FENTANYL CITRATE 25 MCG: 50 INJECTION, SOLUTION INTRAMUSCULAR; INTRAVENOUS at 10:31

## 2019-01-01 RX ADMIN — DORZOLAMIDE HCL 1 DROP: 20 SOLUTION/ DROPS OPHTHALMIC at 08:58

## 2019-01-01 RX ADMIN — ACETAMINOPHEN 500 MG: 500 TABLET ORAL at 20:59

## 2019-01-01 RX ADMIN — HEPARIN SODIUM 5000 UNITS: 10000 INJECTION INTRAVENOUS; SUBCUTANEOUS at 14:49

## 2019-01-01 RX ADMIN — SODIUM CHLORIDE 1000 ML: 9 INJECTION, SOLUTION INTRAVENOUS at 01:57

## 2019-01-01 RX ADMIN — VECURONIUM BROMIDE FOR INJECTION 1 MG: 1 INJECTION, POWDER, LYOPHILIZED, FOR SOLUTION INTRAVENOUS at 07:32

## 2019-01-01 RX ADMIN — METHYLPREDNISOLONE SODIUM SUCCINATE 80 MG: 125 INJECTION, POWDER, FOR SOLUTION INTRAMUSCULAR; INTRAVENOUS at 10:28

## 2019-01-01 RX ADMIN — DORZOLAMIDE HYDROCHLORIDE 1 DROP: 20 SOLUTION/ DROPS OPHTHALMIC at 21:07

## 2019-01-01 RX ADMIN — Medication 10 ML: at 12:49

## 2019-01-01 RX ADMIN — DORZOLAMIDE HYDROCHLORIDE 1 DROP: 20 SOLUTION/ DROPS OPHTHALMIC at 10:51

## 2019-01-01 RX ADMIN — IPRATROPIUM BROMIDE AND ALBUTEROL SULFATE 1 AMPULE: .5; 3 SOLUTION RESPIRATORY (INHALATION) at 08:11

## 2019-01-01 RX ADMIN — SENNOSIDES, DOCUSATE SODIUM 2 TABLET: 50; 8.6 TABLET, FILM COATED ORAL at 16:08

## 2019-01-01 RX ADMIN — KETOROLAC TROMETHAMINE 15 MG: 30 INJECTION, SOLUTION INTRAMUSCULAR at 09:05

## 2019-01-01 RX ADMIN — ALBUTEROL SULFATE 2.5 MG: 2.5 SOLUTION RESPIRATORY (INHALATION) at 20:36

## 2019-01-01 RX ADMIN — VECURONIUM BROMIDE FOR INJECTION 2 MG: 1 INJECTION, POWDER, LYOPHILIZED, FOR SOLUTION INTRAVENOUS at 08:37

## 2019-01-01 RX ADMIN — Medication 10 ML: at 10:53

## 2019-01-01 RX ADMIN — DIPHENHYDRAMINE HYDROCHLORIDE 25 MG: 50 INJECTION, SOLUTION INTRAMUSCULAR; INTRAVENOUS at 16:07

## 2019-01-01 RX ADMIN — DOCUSATE SODIUM 100 MG: 100 CAPSULE, LIQUID FILLED ORAL at 09:05

## 2019-01-01 RX ADMIN — DOCUSATE SODIUM 100 MG: 100 CAPSULE, LIQUID FILLED ORAL at 20:55

## 2019-01-01 RX ADMIN — CEFEPIME 2 G: 2 INJECTION, POWDER, FOR SOLUTION INTRAVENOUS at 04:24

## 2019-01-01 RX ADMIN — DORZOLAMIDE HYDROCHLORIDE 1 DROP: 20 SOLUTION/ DROPS OPHTHALMIC at 13:27

## 2019-01-01 RX ADMIN — METOPROLOL SUCCINATE 25 MG: 25 TABLET, EXTENDED RELEASE ORAL at 08:37

## 2019-01-01 RX ADMIN — LATANOPROST 1 DROP: 50 SOLUTION OPHTHALMIC at 21:10

## 2019-01-01 RX ADMIN — OXYCODONE HYDROCHLORIDE AND ACETAMINOPHEN 1 TABLET: 5; 325 TABLET ORAL at 16:52

## 2019-01-01 RX ADMIN — SUCCINYLCHOLINE CHLORIDE 120 MG: 20 INJECTION, SOLUTION INTRAMUSCULAR; INTRAVENOUS at 07:32

## 2019-01-01 RX ADMIN — EPHEDRINE SULFATE 10 MG: 50 INJECTION, SOLUTION INTRAVENOUS at 08:06

## 2019-01-01 RX ADMIN — Medication 10 ML: at 03:43

## 2019-01-01 RX ADMIN — MINERAL OIL 1 ENEMA: 118 ENEMA RECTAL at 13:59

## 2019-01-01 RX ADMIN — METOPROLOL SUCCINATE 50 MG: 50 TABLET, EXTENDED RELEASE ORAL at 09:06

## 2019-01-01 RX ADMIN — ALBUTEROL SULFATE 2.5 MG: 2.5 SOLUTION RESPIRATORY (INHALATION) at 07:26

## 2019-01-01 RX ADMIN — CEFEPIME 2 G: 2 INJECTION, POWDER, FOR SOLUTION INTRAVENOUS at 20:25

## 2019-01-01 RX ADMIN — Medication 10 ML: at 13:10

## 2019-01-01 RX ADMIN — POLYETHYLENE GLYCOL 3350 17 G: 17 POWDER, FOR SOLUTION ORAL at 11:13

## 2019-01-01 RX ADMIN — IPRATROPIUM BROMIDE AND ALBUTEROL SULFATE 1 AMPULE: .5; 3 SOLUTION RESPIRATORY (INHALATION) at 06:40

## 2019-01-01 RX ADMIN — PANTOPRAZOLE SODIUM 40 MG: 40 INJECTION, POWDER, FOR SOLUTION INTRAVENOUS at 10:19

## 2019-01-01 RX ADMIN — BUSPIRONE HYDROCHLORIDE 10 MG: 10 TABLET ORAL at 14:03

## 2019-01-01 RX ADMIN — METHYLPREDNISOLONE SODIUM SUCCINATE 80 MG: 125 INJECTION, POWDER, FOR SOLUTION INTRAMUSCULAR; INTRAVENOUS at 16:08

## 2019-01-01 RX ADMIN — IPRATROPIUM BROMIDE AND ALBUTEROL SULFATE 1 AMPULE: .5; 3 SOLUTION RESPIRATORY (INHALATION) at 16:20

## 2019-01-01 RX ADMIN — Medication 10 ML: at 01:52

## 2019-01-01 RX ADMIN — HEPARIN SODIUM 5000 UNITS: 10000 INJECTION INTRAVENOUS; SUBCUTANEOUS at 07:04

## 2019-01-01 RX ADMIN — ACETAMINOPHEN 650 MG: 325 TABLET, FILM COATED ORAL at 11:17

## 2019-01-01 RX ADMIN — HEPARIN SODIUM 5000 UNITS: 10000 INJECTION INTRAVENOUS; SUBCUTANEOUS at 21:10

## 2019-01-01 RX ADMIN — VANCOMYCIN HYDROCHLORIDE 1250 MG: 10 INJECTION, POWDER, LYOPHILIZED, FOR SOLUTION INTRAVENOUS at 09:55

## 2019-01-01 RX ADMIN — GABAPENTIN 100 MG: 100 CAPSULE ORAL at 09:05

## 2019-01-01 RX ADMIN — DORZOLAMIDE HYDROCHLORIDE 1 DROP: 20 SOLUTION/ DROPS OPHTHALMIC at 20:58

## 2019-01-01 RX ADMIN — HEPARIN SODIUM 5000 UNITS: 10000 INJECTION INTRAVENOUS; SUBCUTANEOUS at 14:11

## 2019-01-01 RX ADMIN — FENTANYL CITRATE 50 MCG: 50 INJECTION, SOLUTION INTRAMUSCULAR; INTRAVENOUS at 07:32

## 2019-01-01 RX ADMIN — SODIUM CHLORIDE: 9 INJECTION, SOLUTION INTRAVENOUS at 08:39

## 2019-01-01 RX ADMIN — DORZOLAMIDE HCL 1 DROP: 20 SOLUTION/ DROPS OPHTHALMIC at 08:37

## 2019-01-01 RX ADMIN — SENNOSIDES, DOCUSATE SODIUM 2 TABLET: 50; 8.6 TABLET, FILM COATED ORAL at 14:03

## 2019-01-01 RX ADMIN — ACETAMINOPHEN 500 MG: 500 TABLET ORAL at 07:03

## 2019-01-01 RX ADMIN — METOPROLOL TARTRATE 5 MG: 5 INJECTION, SOLUTION INTRAVENOUS at 15:00

## 2019-01-01 RX ADMIN — ALBUTEROL SULFATE 2.5 MG: 2.5 SOLUTION RESPIRATORY (INHALATION) at 07:48

## 2019-01-01 RX ADMIN — CEFEPIME 2 G: 2 INJECTION, POWDER, FOR SOLUTION INTRAVENOUS at 02:22

## 2019-01-01 RX ADMIN — VECURONIUM BROMIDE FOR INJECTION 2 MG: 1 INJECTION, POWDER, LYOPHILIZED, FOR SOLUTION INTRAVENOUS at 09:15

## 2019-01-01 RX ADMIN — Medication 10 ML: at 12:08

## 2019-01-01 RX ADMIN — OXYCODONE HYDROCHLORIDE 5 MG: 5 TABLET ORAL at 13:01

## 2019-01-01 RX ADMIN — LATANOPROST 1 DROP: 50 SOLUTION OPHTHALMIC at 20:58

## 2019-01-01 RX ADMIN — DOXYCYCLINE 100 MG: 100 INJECTION, POWDER, LYOPHILIZED, FOR SOLUTION INTRAVENOUS at 14:34

## 2019-01-01 RX ADMIN — ENOXAPARIN SODIUM 40 MG: 40 INJECTION SUBCUTANEOUS at 09:23

## 2019-01-01 RX ADMIN — ALBUTEROL SULFATE 2.5 MG: 2.5 SOLUTION RESPIRATORY (INHALATION) at 19:43

## 2019-01-01 RX ADMIN — MORPHINE SULFATE 4 MG: 10 INJECTION INTRAVENOUS at 11:05

## 2019-01-01 RX ADMIN — PROPOFOL 60 MG: 10 INJECTION, EMULSION INTRAVENOUS at 09:00

## 2019-01-01 RX ADMIN — DORZOLAMIDE HYDROCHLORIDE 1 DROP: 20 SOLUTION/ DROPS OPHTHALMIC at 20:34

## 2019-01-01 RX ADMIN — HEPARIN SODIUM 5000 UNITS: 10000 INJECTION INTRAVENOUS; SUBCUTANEOUS at 14:13

## 2019-01-01 RX ADMIN — DORZOLAMIDE HCL 1 DROP: 20 SOLUTION/ DROPS OPHTHALMIC at 20:25

## 2019-01-01 RX ADMIN — LATANOPROST 1 DROP: 50 SOLUTION OPHTHALMIC at 21:01

## 2019-01-01 RX ADMIN — NYSTATIN 500000 UNITS: 100000 SUSPENSION ORAL at 08:57

## 2019-01-01 RX ADMIN — METOPROLOL TARTRATE 5 MG: 5 INJECTION, SOLUTION INTRAVENOUS at 10:51

## 2019-01-01 RX ADMIN — DORZOLAMIDE HYDROCHLORIDE 1 DROP: 20 SOLUTION/ DROPS OPHTHALMIC at 21:23

## 2019-01-01 RX ADMIN — METOPROLOL SUCCINATE 25 MG: 25 TABLET, EXTENDED RELEASE ORAL at 10:49

## 2019-01-01 RX ADMIN — OXYCODONE HYDROCHLORIDE AND ACETAMINOPHEN 1 TABLET: 5; 325 TABLET ORAL at 10:36

## 2019-01-01 RX ADMIN — METOPROLOL SUCCINATE 50 MG: 50 TABLET, EXTENDED RELEASE ORAL at 09:35

## 2019-01-01 RX ADMIN — SODIUM CHLORIDE, POTASSIUM CHLORIDE, SODIUM LACTATE AND CALCIUM CHLORIDE: 600; 310; 30; 20 INJECTION, SOLUTION INTRAVENOUS at 19:28

## 2019-01-01 RX ADMIN — BUPIVACAINE HYDROCHLORIDE 6 ML/HR: 7.5 INJECTION, SOLUTION EPIDURAL; RETROBULBAR at 10:15

## 2019-01-01 RX ADMIN — BUSPIRONE HYDROCHLORIDE 10 MG: 10 TABLET ORAL at 20:26

## 2019-01-01 RX ADMIN — CEFEPIME 2 G: 2 INJECTION, POWDER, FOR SOLUTION INTRAVENOUS at 05:16

## 2019-01-01 RX ADMIN — PANTOPRAZOLE SODIUM 40 MG: 40 INJECTION, POWDER, FOR SOLUTION INTRAVENOUS at 09:05

## 2019-01-01 RX ADMIN — MORPHINE SULFATE 2 MG: 10 INJECTION INTRAVENOUS at 10:48

## 2019-01-01 RX ADMIN — CEFOXITIN SODIUM 2 G: 2 POWDER, FOR SOLUTION INTRAVENOUS at 07:32

## 2019-01-01 RX ADMIN — ENOXAPARIN SODIUM 40 MG: 40 INJECTION SUBCUTANEOUS at 10:46

## 2019-01-01 RX ADMIN — ONDANSETRON 4 MG: 2 INJECTION INTRAMUSCULAR; INTRAVENOUS at 00:49

## 2019-01-01 RX ADMIN — IPRATROPIUM BROMIDE AND ALBUTEROL SULFATE 1 AMPULE: .5; 3 SOLUTION RESPIRATORY (INHALATION) at 16:55

## 2019-01-01 RX ADMIN — IPRATROPIUM BROMIDE AND ALBUTEROL SULFATE 1 AMPULE: .5; 3 SOLUTION RESPIRATORY (INHALATION) at 16:52

## 2019-01-01 RX ADMIN — EPHEDRINE SULFATE 5 MG: 50 INJECTION, SOLUTION INTRAVENOUS at 08:13

## 2019-01-01 RX ADMIN — ACETAMINOPHEN 500 MG: 500 TABLET ORAL at 09:05

## 2019-01-01 RX ADMIN — IPRATROPIUM BROMIDE AND ALBUTEROL SULFATE 1 AMPULE: .5; 3 SOLUTION RESPIRATORY (INHALATION) at 11:52

## 2019-01-01 RX ADMIN — ACETAMINOPHEN 500 MG: 500 TABLET ORAL at 22:48

## 2019-01-01 RX ADMIN — IOPAMIDOL 60 ML: 755 INJECTION, SOLUTION INTRAVENOUS at 18:08

## 2019-01-01 RX ADMIN — GABAPENTIN 100 MG: 100 CAPSULE ORAL at 20:55

## 2019-01-01 RX ADMIN — IPRATROPIUM BROMIDE AND ALBUTEROL SULFATE 1 AMPULE: .5; 3 SOLUTION RESPIRATORY (INHALATION) at 20:21

## 2019-01-01 RX ADMIN — OXYCODONE HYDROCHLORIDE AND ACETAMINOPHEN 1 TABLET: 5; 325 TABLET ORAL at 04:23

## 2019-01-01 RX ADMIN — IPRATROPIUM BROMIDE AND ALBUTEROL SULFATE 1 AMPULE: .5; 3 SOLUTION RESPIRATORY (INHALATION) at 16:48

## 2019-01-01 RX ADMIN — PROPOFOL 100 MG: 10 INJECTION, EMULSION INTRAVENOUS at 08:44

## 2019-01-01 RX ADMIN — GABAPENTIN 100 MG: 100 CAPSULE ORAL at 21:02

## 2019-01-01 RX ADMIN — KETOROLAC TROMETHAMINE 15 MG: 30 INJECTION, SOLUTION INTRAMUSCULAR at 14:10

## 2019-01-01 RX ADMIN — BUPIVACAINE HYDROCHLORIDE: 7.5 INJECTION, SOLUTION EPIDURAL; RETROBULBAR at 22:16

## 2019-01-01 RX ADMIN — DOCUSATE SODIUM 100 MG: 100 CAPSULE, LIQUID FILLED ORAL at 09:23

## 2019-01-01 RX ADMIN — ACETAMINOPHEN 650 MG: 325 TABLET, FILM COATED ORAL at 20:55

## 2019-01-01 RX ADMIN — ALBUTEROL SULFATE 2.5 MG: 2.5 SOLUTION RESPIRATORY (INHALATION) at 20:05

## 2019-01-01 RX ADMIN — ALBUTEROL SULFATE 2.5 MG: 2.5 SOLUTION RESPIRATORY (INHALATION) at 21:15

## 2019-01-01 RX ADMIN — FENTANYL CITRATE 50 MCG: 50 INJECTION, SOLUTION INTRAMUSCULAR; INTRAVENOUS at 07:45

## 2019-01-01 RX ADMIN — METHYLPREDNISOLONE SODIUM SUCCINATE 80 MG: 125 INJECTION, POWDER, FOR SOLUTION INTRAMUSCULAR; INTRAVENOUS at 08:57

## 2019-01-01 RX ADMIN — DORZOLAMIDE HYDROCHLORIDE 1 DROP: 20 SOLUTION/ DROPS OPHTHALMIC at 21:52

## 2019-01-01 RX ADMIN — HEPARIN SODIUM 5000 UNITS: 10000 INJECTION INTRAVENOUS; SUBCUTANEOUS at 15:43

## 2019-01-01 RX ADMIN — FENTANYL CITRATE 50 MCG: 50 INJECTION, SOLUTION INTRAMUSCULAR; INTRAVENOUS at 08:35

## 2019-01-01 RX ADMIN — OXYCODONE HYDROCHLORIDE 5 MG: 5 TABLET ORAL at 20:18

## 2019-01-01 RX ADMIN — DORZOLAMIDE HYDROCHLORIDE 1 DROP: 20 SOLUTION/ DROPS OPHTHALMIC at 21:10

## 2019-01-01 RX ADMIN — LATANOPROST 1 DROP: 50 SOLUTION OPHTHALMIC at 21:52

## 2019-01-01 RX ADMIN — Medication 10 ML: at 21:54

## 2019-01-01 RX ADMIN — ONDANSETRON HYDROCHLORIDE 4 MG: 2 SOLUTION INTRAMUSCULAR; INTRAVENOUS at 08:58

## 2019-01-01 RX ADMIN — ALBUTEROL SULFATE 2.5 MG: 2.5 SOLUTION RESPIRATORY (INHALATION) at 07:49

## 2019-01-01 RX ADMIN — ACETAMINOPHEN 500 MG: 500 TABLET ORAL at 07:41

## 2019-01-01 RX ADMIN — GABAPENTIN 100 MG: 100 CAPSULE ORAL at 14:38

## 2019-01-01 RX ADMIN — OXYCODONE HYDROCHLORIDE AND ACETAMINOPHEN 1 TABLET: 5; 325 TABLET ORAL at 08:37

## 2019-01-01 RX ADMIN — DOCUSATE SODIUM 100 MG: 100 CAPSULE, LIQUID FILLED ORAL at 21:02

## 2019-01-01 RX ADMIN — Medication 10 ML: at 10:29

## 2019-01-01 RX ADMIN — DORZOLAMIDE HYDROCHLORIDE 1 DROP: 20 SOLUTION/ DROPS OPHTHALMIC at 08:13

## 2019-01-01 RX ADMIN — IPRATROPIUM BROMIDE AND ALBUTEROL SULFATE 1 AMPULE: .5; 3 SOLUTION RESPIRATORY (INHALATION) at 15:52

## 2019-01-01 RX ADMIN — HEPARIN SODIUM 5000 UNITS: 10000 INJECTION INTRAVENOUS; SUBCUTANEOUS at 21:00

## 2019-01-01 RX ADMIN — ENOXAPARIN SODIUM 40 MG: 40 INJECTION SUBCUTANEOUS at 09:27

## 2019-01-01 RX ADMIN — DOXYCYCLINE 100 MG: 100 INJECTION, POWDER, LYOPHILIZED, FOR SOLUTION INTRAVENOUS at 02:32

## 2019-01-01 RX ADMIN — CEFEPIME 2 G: 2 INJECTION, POWDER, FOR SOLUTION INTRAVENOUS at 04:36

## 2019-01-01 RX ADMIN — GABAPENTIN 100 MG: 100 CAPSULE ORAL at 21:52

## 2019-01-01 RX ADMIN — DORZOLAMIDE HCL 1 DROP: 20 SOLUTION/ DROPS OPHTHALMIC at 20:47

## 2019-01-01 RX ADMIN — Medication 10 ML: at 08:59

## 2019-01-01 RX ADMIN — ACETAMINOPHEN 500 MG: 500 TABLET ORAL at 14:52

## 2019-01-01 RX ADMIN — MORPHINE SULFATE 7.5 MG: 15 TABLET ORAL at 15:49

## 2019-01-01 RX ADMIN — OXYCODONE HYDROCHLORIDE AND ACETAMINOPHEN 1 TABLET: 5; 325 TABLET ORAL at 16:38

## 2019-01-01 RX ADMIN — DOCUSATE SODIUM 100 MG: 100 CAPSULE, LIQUID FILLED ORAL at 10:18

## 2019-01-01 RX ADMIN — KETOROLAC TROMETHAMINE 15 MG: 30 INJECTION, SOLUTION INTRAMUSCULAR at 14:14

## 2019-01-01 RX ADMIN — GABAPENTIN 100 MG: 100 CAPSULE ORAL at 09:28

## 2019-01-01 RX ADMIN — PHENYLEPHRINE HYDROCHLORIDE 200 MCG: 10 INJECTION INTRAVENOUS at 07:38

## 2019-01-01 RX ADMIN — IPRATROPIUM BROMIDE AND ALBUTEROL SULFATE 1 AMPULE: .5; 3 SOLUTION RESPIRATORY (INHALATION) at 12:20

## 2019-01-01 RX ADMIN — DORZOLAMIDE HYDROCHLORIDE 1 DROP: 20 SOLUTION/ DROPS OPHTHALMIC at 08:49

## 2019-01-01 RX ADMIN — IPRATROPIUM BROMIDE AND ALBUTEROL SULFATE 1 AMPULE: .5; 3 SOLUTION RESPIRATORY (INHALATION) at 20:43

## 2019-01-01 RX ADMIN — HEPARIN SODIUM 5000 UNITS: 10000 INJECTION INTRAVENOUS; SUBCUTANEOUS at 05:45

## 2019-01-01 RX ADMIN — DORZOLAMIDE HYDROCHLORIDE 1 DROP: 20 SOLUTION/ DROPS OPHTHALMIC at 10:19

## 2019-01-01 RX ADMIN — Medication 10 ML: at 08:58

## 2019-01-01 RX ADMIN — CEFEPIME HYDROCHLORIDE 2 G: 2 INJECTION, POWDER, FOR SOLUTION INTRAVENOUS at 20:08

## 2019-01-01 RX ADMIN — IPRATROPIUM BROMIDE AND ALBUTEROL SULFATE 1 AMPULE: .5; 3 SOLUTION RESPIRATORY (INHALATION) at 17:35

## 2019-01-01 RX ADMIN — HEPARIN SODIUM 5000 UNITS: 10000 INJECTION INTRAVENOUS; SUBCUTANEOUS at 14:50

## 2019-01-01 RX ADMIN — LATANOPROST 1 DROP: 50 SOLUTION OPHTHALMIC at 21:23

## 2019-01-01 RX ADMIN — PANTOPRAZOLE SODIUM 40 MG: 40 INJECTION, POWDER, FOR SOLUTION INTRAVENOUS at 15:43

## 2019-01-01 RX ADMIN — CEFEPIME 2 G: 2 INJECTION, POWDER, FOR SOLUTION INTRAVENOUS at 14:10

## 2019-01-01 RX ADMIN — METOPROLOL SUCCINATE 25 MG: 25 TABLET, EXTENDED RELEASE ORAL at 09:28

## 2019-01-01 RX ADMIN — FUROSEMIDE 20 MG: 10 INJECTION, SOLUTION INTRAVENOUS at 17:17

## 2019-01-01 RX ADMIN — KETOROLAC TROMETHAMINE 15 MG: 30 INJECTION, SOLUTION INTRAMUSCULAR at 20:30

## 2019-01-01 RX ADMIN — LATANOPROST 1 DROP: 50 SOLUTION OPHTHALMIC at 21:07

## 2019-01-01 RX ADMIN — CEFEPIME 2 G: 2 INJECTION, POWDER, FOR SOLUTION INTRAVENOUS at 16:08

## 2019-01-01 RX ADMIN — OXYCODONE HYDROCHLORIDE AND ACETAMINOPHEN 1 TABLET: 5; 325 TABLET ORAL at 23:20

## 2019-01-01 RX ADMIN — Medication 10 ML: at 20:27

## 2019-01-01 RX ADMIN — OXYCODONE HYDROCHLORIDE AND ACETAMINOPHEN 1 TABLET: 5; 325 TABLET ORAL at 03:56

## 2019-01-01 RX ADMIN — GABAPENTIN 100 MG: 100 CAPSULE ORAL at 14:10

## 2019-01-01 RX ADMIN — DORZOLAMIDE HYDROCHLORIDE 1 DROP: 20 SOLUTION/ DROPS OPHTHALMIC at 09:28

## 2019-01-01 RX ADMIN — ALBUTEROL SULFATE 2.5 MG: 2.5 SOLUTION RESPIRATORY (INHALATION) at 17:51

## 2019-01-01 RX ADMIN — TRIAMTERENE AND HYDROCHLOROTHIAZIDE 1 TABLET: 37.5; 25 TABLET ORAL at 11:54

## 2019-01-01 RX ADMIN — NYSTATIN 500000 UNITS: 100000 SUSPENSION ORAL at 20:47

## 2019-01-01 RX ADMIN — ALBUTEROL SULFATE 2.5 MG: 2.5 SOLUTION RESPIRATORY (INHALATION) at 15:56

## 2019-01-01 RX ADMIN — METHYLPREDNISOLONE SODIUM SUCCINATE 80 MG: 125 INJECTION, POWDER, FOR SOLUTION INTRAMUSCULAR; INTRAVENOUS at 08:36

## 2019-01-01 RX ADMIN — CEFEPIME 2 G: 2 INJECTION, POWDER, FOR SOLUTION INTRAVENOUS at 13:38

## 2019-01-01 RX ADMIN — LATANOPROST 1 DROP: 50 SOLUTION OPHTHALMIC at 20:34

## 2019-01-01 RX ADMIN — TRIAMTERENE AND HYDROCHLOROTHIAZIDE 1 TABLET: 37.5; 25 TABLET ORAL at 09:35

## 2019-01-01 RX ADMIN — SODIUM CHLORIDE: 9 INJECTION, SOLUTION INTRAVENOUS at 12:33

## 2019-01-01 RX ADMIN — SODIUM CHLORIDE: 9 INJECTION, SOLUTION INTRAVENOUS at 03:37

## 2019-01-01 RX ADMIN — KETOROLAC TROMETHAMINE 15 MG: 30 INJECTION, SOLUTION INTRAMUSCULAR at 08:05

## 2019-01-01 RX ADMIN — AMLODIPINE BESYLATE 2.5 MG: 2.5 TABLET ORAL at 09:23

## 2019-01-01 RX ADMIN — METOPROLOL SUCCINATE 50 MG: 50 TABLET, EXTENDED RELEASE ORAL at 09:12

## 2019-01-01 RX ADMIN — SODIUM CHLORIDE: 9 INJECTION, SOLUTION INTRAVENOUS at 15:04

## 2019-01-01 RX ADMIN — Medication 10 ML: at 04:36

## 2019-01-01 RX ADMIN — VANCOMYCIN HYDROCHLORIDE 1.5 G: 10 INJECTION, POWDER, LYOPHILIZED, FOR SOLUTION INTRAVENOUS at 01:52

## 2019-01-01 RX ADMIN — DOCUSATE SODIUM 100 MG: 100 CAPSULE, LIQUID FILLED ORAL at 08:49

## 2019-01-01 RX ADMIN — HEPARIN SODIUM 5000 UNITS: 10000 INJECTION INTRAVENOUS; SUBCUTANEOUS at 06:25

## 2019-01-01 RX ADMIN — KETOROLAC TROMETHAMINE 15 MG: 30 INJECTION, SOLUTION INTRAMUSCULAR at 00:40

## 2019-01-01 RX ADMIN — TRIAMTERENE AND HYDROCHLOROTHIAZIDE 1 TABLET: 37.5; 25 TABLET ORAL at 09:27

## 2019-01-01 RX ADMIN — NYSTATIN 500000 UNITS: 100000 SUSPENSION ORAL at 08:37

## 2019-01-01 RX ADMIN — METOPROLOL SUCCINATE 25 MG: 50 TABLET, FILM COATED, EXTENDED RELEASE ORAL at 10:18

## 2019-01-01 RX ADMIN — CEFEPIME 2 G: 2 INJECTION, POWDER, FOR SOLUTION INTRAVENOUS at 02:27

## 2019-01-01 RX ADMIN — DORZOLAMIDE HYDROCHLORIDE 1 DROP: 20 SOLUTION/ DROPS OPHTHALMIC at 20:54

## 2019-01-01 RX ADMIN — HEPARIN SODIUM 5000 UNITS: 10000 INJECTION INTRAVENOUS; SUBCUTANEOUS at 06:23

## 2019-01-01 RX ADMIN — DORZOLAMIDE HYDROCHLORIDE 1 DROP: 20 SOLUTION/ DROPS OPHTHALMIC at 09:12

## 2019-01-01 RX ADMIN — Medication 10 ML: at 20:24

## 2019-01-01 RX ADMIN — KETOROLAC TROMETHAMINE 15 MG: 30 INJECTION, SOLUTION INTRAMUSCULAR at 21:07

## 2019-01-01 RX ADMIN — Medication 10 ML: at 08:57

## 2019-01-01 RX ADMIN — ALBUTEROL SULFATE 2.5 MG: 2.5 SOLUTION RESPIRATORY (INHALATION) at 11:50

## 2019-01-01 RX ADMIN — Medication 10 ML: at 20:47

## 2019-01-01 RX ADMIN — CEFEPIME 2 G: 2 INJECTION, POWDER, FOR SOLUTION INTRAVENOUS at 20:47

## 2019-01-01 RX ADMIN — MORPHINE SULFATE 7.5 MG: 15 TABLET ORAL at 11:32

## 2019-01-01 RX ADMIN — METOPROLOL SUCCINATE 25 MG: 25 TABLET, EXTENDED RELEASE ORAL at 08:57

## 2019-01-01 RX ADMIN — CEFEPIME 2 G: 2 INJECTION, POWDER, FOR SOLUTION INTRAVENOUS at 12:06

## 2019-01-01 RX ADMIN — SENNOSIDES, DOCUSATE SODIUM 2 TABLET: 50; 8.6 TABLET, FILM COATED ORAL at 08:37

## 2019-01-01 RX ADMIN — LIDOCAINE HYDROCHLORIDE 90 MG: 20 INJECTION, SOLUTION INFILTRATION; PERINEURAL at 07:32

## 2019-01-01 RX ADMIN — Medication 10 ML: at 09:32

## 2019-01-01 RX ADMIN — VANCOMYCIN HYDROCHLORIDE 1250 MG: 10 INJECTION, POWDER, LYOPHILIZED, FOR SOLUTION INTRAVENOUS at 11:13

## 2019-01-01 RX ADMIN — PANTOPRAZOLE SODIUM 40 MG: 40 INJECTION, POWDER, FOR SOLUTION INTRAVENOUS at 08:41

## 2019-01-01 RX ADMIN — NYSTATIN 500000 UNITS: 100000 SUSPENSION ORAL at 14:03

## 2019-01-01 RX ADMIN — HEPARIN SODIUM 5000 UNITS: 10000 INJECTION INTRAVENOUS; SUBCUTANEOUS at 21:54

## 2019-01-01 RX ADMIN — CEFEPIME 2 G: 2 INJECTION, POWDER, FOR SOLUTION INTRAVENOUS at 13:44

## 2019-01-01 RX ADMIN — ACETAMINOPHEN 500 MG: 500 TABLET ORAL at 22:16

## 2019-01-01 RX ADMIN — ALBUTEROL SULFATE 2.5 MG: 2.5 SOLUTION RESPIRATORY (INHALATION) at 11:35

## 2019-01-01 RX ADMIN — CEFEPIME 2 G: 2 INJECTION, POWDER, FOR SOLUTION INTRAVENOUS at 13:10

## 2019-01-01 RX ADMIN — SODIUM CHLORIDE 500 ML: 9 INJECTION, SOLUTION INTRAVENOUS at 16:30

## 2019-01-01 RX ADMIN — LATANOPROST 1 DROP: 50 SOLUTION/ DROPS OPHTHALMIC at 20:46

## 2019-01-01 RX ADMIN — Medication 10 ML: at 08:38

## 2019-01-01 RX ADMIN — DORZOLAMIDE HYDROCHLORIDE 1 DROP: 20 SOLUTION/ DROPS OPHTHALMIC at 22:17

## 2019-01-01 RX ADMIN — ENOXAPARIN SODIUM 40 MG: 40 INJECTION SUBCUTANEOUS at 09:35

## 2019-01-01 RX ADMIN — CALCIUM CARBONATE (ANTACID) CHEW TAB 500 MG 500 MG: 500 CHEW TAB at 20:26

## 2019-01-01 RX ADMIN — DOCUSATE SODIUM 100 MG: 100 CAPSULE, LIQUID FILLED ORAL at 14:13

## 2019-01-01 RX ADMIN — CEFEPIME 2 G: 2 INJECTION, POWDER, FOR SOLUTION INTRAVENOUS at 01:45

## 2019-01-01 RX ADMIN — ONDANSETRON HYDROCHLORIDE 4 MG: 2 INJECTION, SOLUTION INTRAMUSCULAR; INTRAVENOUS at 10:20

## 2019-01-01 RX ADMIN — SENNOSIDES, DOCUSATE SODIUM 2 TABLET: 50; 8.6 TABLET, FILM COATED ORAL at 18:57

## 2019-01-01 RX ADMIN — ALBUTEROL SULFATE 2.5 MG: 2.5 SOLUTION RESPIRATORY (INHALATION) at 16:26

## 2019-01-01 RX ADMIN — ACETAMINOPHEN 500 MG: 500 TABLET ORAL at 20:24

## 2019-01-01 RX ADMIN — KETOROLAC TROMETHAMINE 15 MG: 30 INJECTION, SOLUTION INTRAMUSCULAR at 12:48

## 2019-01-01 RX ADMIN — DORZOLAMIDE HYDROCHLORIDE 1 DROP: 20 SOLUTION/ DROPS OPHTHALMIC at 09:04

## 2019-01-01 RX ADMIN — HEPARIN SODIUM 5000 UNITS: 10000 INJECTION INTRAVENOUS; SUBCUTANEOUS at 14:36

## 2019-01-01 RX ADMIN — SODIUM CHLORIDE: 9 INJECTION, SOLUTION INTRAVENOUS at 05:55

## 2019-01-01 RX ADMIN — GABAPENTIN 100 MG: 100 CAPSULE ORAL at 13:46

## 2019-01-01 RX ADMIN — DOCUSATE SODIUM 100 MG: 100 CAPSULE, LIQUID FILLED ORAL at 09:12

## 2019-01-01 RX ADMIN — IPRATROPIUM BROMIDE AND ALBUTEROL SULFATE 1 AMPULE: .5; 3 SOLUTION RESPIRATORY (INHALATION) at 11:07

## 2019-01-01 RX ADMIN — KETOROLAC TROMETHAMINE 15 MG: 30 INJECTION, SOLUTION INTRAMUSCULAR at 08:57

## 2019-01-01 RX ADMIN — NYSTATIN 500000 UNITS: 100000 SUSPENSION ORAL at 16:44

## 2019-01-01 ASSESSMENT — PAIN SCALES - GENERAL
PAINLEVEL_OUTOF10: 8
PAINLEVEL_OUTOF10: 0
PAINLEVEL_OUTOF10: 5
PAINLEVEL_OUTOF10: 6
PAINLEVEL_OUTOF10: 5
PAINLEVEL_OUTOF10: 6
PAINLEVEL_OUTOF10: 2
PAINLEVEL_OUTOF10: 5
PAINLEVEL_OUTOF10: 0
PAINLEVEL_OUTOF10: 4
PAINLEVEL_OUTOF10: 4
PAINLEVEL_OUTOF10: 7
PAINLEVEL_OUTOF10: 3
PAINLEVEL_OUTOF10: 3
PAINLEVEL_OUTOF10: 5
PAINLEVEL_OUTOF10: 7
PAINLEVEL_OUTOF10: 0
PAINLEVEL_OUTOF10: 3
PAINLEVEL_OUTOF10: 0
PAINLEVEL_OUTOF10: 4
PAINLEVEL_OUTOF10: 3
PAINLEVEL_OUTOF10: 0
PAINLEVEL_OUTOF10: 0
PAINLEVEL_OUTOF10: 3
PAINLEVEL_OUTOF10: 0
PAINLEVEL_OUTOF10: 5
PAINLEVEL_OUTOF10: 3
PAINLEVEL_OUTOF10: 6
PAINLEVEL_OUTOF10: 0
PAINLEVEL_OUTOF10: 10
PAINLEVEL_OUTOF10: 4
PAINLEVEL_OUTOF10: 5
PAINLEVEL_OUTOF10: 10
PAINLEVEL_OUTOF10: 9
PAINLEVEL_OUTOF10: 5
PAINLEVEL_OUTOF10: 0
PAINLEVEL_OUTOF10: 2
PAINLEVEL_OUTOF10: 0
PAINLEVEL_OUTOF10: 5
PAINLEVEL_OUTOF10: 5
PAINLEVEL_OUTOF10: 4
PAINLEVEL_OUTOF10: 7
PAINLEVEL_OUTOF10: 5
PAINLEVEL_OUTOF10: 0
PAINLEVEL_OUTOF10: 9
PAINLEVEL_OUTOF10: 5
PAINLEVEL_OUTOF10: 0
PAINLEVEL_OUTOF10: 1
PAINLEVEL_OUTOF10: 5
PAINLEVEL_OUTOF10: 3
PAINLEVEL_OUTOF10: 7
PAINLEVEL_OUTOF10: 8
PAINLEVEL_OUTOF10: 3
PAINLEVEL_OUTOF10: 0
PAINLEVEL_OUTOF10: 5
PAINLEVEL_OUTOF10: 9
PAINLEVEL_OUTOF10: 6
PAINLEVEL_OUTOF10: 0
PAINLEVEL_OUTOF10: 6
PAINLEVEL_OUTOF10: 6
PAINLEVEL_OUTOF10: 0
PAINLEVEL_OUTOF10: 0
PAINLEVEL_OUTOF10: 2
PAINLEVEL_OUTOF10: 6
PAINLEVEL_OUTOF10: 3
PAINLEVEL_OUTOF10: 0
PAINLEVEL_OUTOF10: 7
PAINLEVEL_OUTOF10: 6
PAINLEVEL_OUTOF10: 5
PAINLEVEL_OUTOF10: 6
PAINLEVEL_OUTOF10: 1
PAINLEVEL_OUTOF10: 5
PAINLEVEL_OUTOF10: 4
PAINLEVEL_OUTOF10: 0
PAINLEVEL_OUTOF10: 6
PAINLEVEL_OUTOF10: 0
PAINLEVEL_OUTOF10: 0
PAINLEVEL_OUTOF10: 8
PAINLEVEL_OUTOF10: 2
PAINLEVEL_OUTOF10: 0
PAINLEVEL_OUTOF10: 5
PAINLEVEL_OUTOF10: 10
PAINLEVEL_OUTOF10: 8
PAINLEVEL_OUTOF10: 6
PAINLEVEL_OUTOF10: 3
PAINLEVEL_OUTOF10: 8
PAINLEVEL_OUTOF10: 2
PAINLEVEL_OUTOF10: 0
PAINLEVEL_OUTOF10: 6
PAINLEVEL_OUTOF10: 6
PAINLEVEL_OUTOF10: 0

## 2019-01-01 ASSESSMENT — PAIN DESCRIPTION - DIRECTION
RADIATING_TOWARDS: R BACK
RADIATING_TOWARDS: R BACK
RADIATING_TOWARDS: BACK
RADIATING_TOWARDS: FLANK AREA
RADIATING_TOWARDS: R BACK

## 2019-01-01 ASSESSMENT — PULMONARY FUNCTION TESTS
PIF_VALUE: 23
PIF_VALUE: 25
PIF_VALUE: 6
PIF_VALUE: 21
PIF_VALUE: 21
PIF_VALUE: 23
PIF_VALUE: 11
PIF_VALUE: 24
PIF_VALUE: 26
PIF_VALUE: 25
PIF_VALUE: 21
PIF_VALUE: 23
PIF_VALUE: 27
PIF_VALUE: 1
PIF_VALUE: 26
PIF_VALUE: 29
PIF_VALUE: 25
PIF_VALUE: 23
PIF_VALUE: 0
PIF_VALUE: 26
PIF_VALUE: 20
PIF_VALUE: 26
PIF_VALUE: 21
PIF_VALUE: 21
PIF_VALUE: 25
PIF_VALUE: 26
PIF_VALUE: 20
PIF_VALUE: 24
PIF_VALUE: 26
PIF_VALUE: 22
PIF_VALUE: 26
PIF_VALUE: 26
PIF_VALUE: 24
PIF_VALUE: 22
PIF_VALUE: 11
PIF_VALUE: 26
PIF_VALUE: 24
PIF_VALUE: 21
PIF_VALUE: 26
PIF_VALUE: 23
PIF_VALUE: 26
PIF_VALUE: 23
PIF_VALUE: 23
PIF_VALUE: 25
PIF_VALUE: 26
PIF_VALUE: 22
PIF_VALUE: 24
PIF_VALUE: 24
PIF_VALUE: 20
PIF_VALUE: 26
PIF_VALUE: 23
PIF_VALUE: 11
PIF_VALUE: 25
PIF_VALUE: 20
PIF_VALUE: 26
PIF_VALUE: 26
PIF_VALUE: 24
PIF_VALUE: 27
PIF_VALUE: 26
PIF_VALUE: 16
PIF_VALUE: 26
PIF_VALUE: 0
PIF_VALUE: 26
PIF_VALUE: 20
PIF_VALUE: 25
PIF_VALUE: 26
PIF_VALUE: 24
PIF_VALUE: 0
PIF_VALUE: 25
PIF_VALUE: 26
PIF_VALUE: 27
PIF_VALUE: 26
PIF_VALUE: 26
PIF_VALUE: 20
PIF_VALUE: 26
PIF_VALUE: 26
PIF_VALUE: 25
PIF_VALUE: 26
PIF_VALUE: 24
PIF_VALUE: 23
PIF_VALUE: 0
PIF_VALUE: 25
PIF_VALUE: 25
PIF_VALUE: 20
PIF_VALUE: 21
PIF_VALUE: 25
PIF_VALUE: 26
PIF_VALUE: 26
PIF_VALUE: 20
PIF_VALUE: 26
PIF_VALUE: 24
PIF_VALUE: 20
PIF_VALUE: 23
PIF_VALUE: 23
PIF_VALUE: 26
PIF_VALUE: 26
PEFR_L/MIN: 18
PIF_VALUE: 26
PIF_VALUE: 26
PIF_VALUE: 15
PIF_VALUE: 11
PIF_VALUE: 0
PIF_VALUE: 25
PIF_VALUE: 21
PIF_VALUE: 21
PIF_VALUE: 10
PIF_VALUE: 1
PIF_VALUE: 24
PIF_VALUE: 26
PIF_VALUE: 24
PIF_VALUE: 25
PIF_VALUE: 24
PIF_VALUE: 25
PIF_VALUE: 24
PIF_VALUE: 16
PIF_VALUE: 11
PIF_VALUE: 23
PIF_VALUE: 26
PIF_VALUE: 26
PIF_VALUE: 9
PIF_VALUE: 22
PIF_VALUE: 21
PIF_VALUE: 25
PIF_VALUE: 24
PIF_VALUE: 25
PIF_VALUE: 22
PIF_VALUE: 21
PIF_VALUE: 26
PIF_VALUE: 13
PIF_VALUE: 21
PIF_VALUE: 26
PIF_VALUE: 26
PIF_VALUE: 25
PIF_VALUE: 26
PIF_VALUE: 26
PIF_VALUE: 25
PIF_VALUE: 25
PIF_VALUE: 0
PIF_VALUE: 26
PIF_VALUE: 23
PIF_VALUE: 25
PIF_VALUE: 25
PIF_VALUE: 26
PIF_VALUE: 24
PIF_VALUE: 22
PIF_VALUE: 24
PIF_VALUE: 23
PIF_VALUE: 26
PIF_VALUE: 25
PIF_VALUE: 0
PIF_VALUE: 23
PIF_VALUE: 23
PIF_VALUE: 21
PIF_VALUE: 30
PIF_VALUE: 25
PIF_VALUE: 24
PIF_VALUE: 4
PIF_VALUE: 26
PIF_VALUE: 23
PIF_VALUE: 24
PIF_VALUE: 25
PIF_VALUE: 26
PIF_VALUE: 26
PIF_VALUE: 20
PIF_VALUE: 25
PIF_VALUE: 26
PIF_VALUE: 27
PIF_VALUE: 26
PIF_VALUE: 24
PIF_VALUE: 21
PIF_VALUE: 26
PIF_VALUE: 25
PIF_VALUE: 21
PIF_VALUE: 15
PIF_VALUE: 26
PIF_VALUE: 21
PIF_VALUE: 22
PIF_VALUE: 15
PIF_VALUE: 22
PIF_VALUE: 26
PIF_VALUE: 23
PIF_VALUE: 23
PIF_VALUE: 0
PIF_VALUE: 0
PIF_VALUE: 27
PIF_VALUE: 22
PIF_VALUE: 21
PIF_VALUE: 26
PIF_VALUE: 27
PIF_VALUE: 21
PIF_VALUE: 3
PIF_VALUE: 26
PIF_VALUE: 23
PIF_VALUE: 29
PIF_VALUE: 26
PIF_VALUE: 20

## 2019-01-01 ASSESSMENT — ENCOUNTER SYMPTOMS
VOMITING: 0
EYE DISCHARGE: 0
DIARRHEA: 0
SHORTNESS OF BREATH: 1
DIARRHEA: 0
SORE THROAT: 0
EYE PAIN: 0
SINUS PRESSURE: 0
EYE REDNESS: 0
NAUSEA: 0
NAUSEA: 0
VOMITING: 0
SHORTNESS OF BREATH: 1
CONSTIPATION: 0
ABDOMINAL PAIN: 0
EYE PAIN: 0
COUGH: 0
ABDOMINAL PAIN: 1
ABDOMINAL PAIN: 0
BACK PAIN: 0
ABDOMINAL PAIN: 1
ABDOMINAL DISTENTION: 0
PHOTOPHOBIA: 0
PHOTOPHOBIA: 0
EYE DISCHARGE: 0
EYE DISCHARGE: 0
WHEEZING: 0
SHORTNESS OF BREATH: 0
BACK PAIN: 0
VOMITING: 0
CONSTIPATION: 0
COUGH: 1
SHORTNESS OF BREATH: 0
DIARRHEA: 0
NAUSEA: 1
BACK PAIN: 0
EYE PAIN: 0
BLOOD IN STOOL: 0
BLOOD IN STOOL: 0
BACK PAIN: 0
SHORTNESS OF BREATH: 1

## 2019-01-01 ASSESSMENT — PAIN DESCRIPTION - ORIENTATION
ORIENTATION: LEFT
ORIENTATION: LEFT
ORIENTATION: MID
ORIENTATION: LEFT
ORIENTATION: RIGHT;MID
ORIENTATION: LEFT;LOWER
ORIENTATION: LEFT;OUTER;UPPER
ORIENTATION: RIGHT
ORIENTATION: LEFT
ORIENTATION: LEFT
ORIENTATION: LEFT;LOWER
ORIENTATION: RIGHT
ORIENTATION: LEFT
ORIENTATION: RIGHT;MID
ORIENTATION: MID
ORIENTATION: LOWER;LEFT
ORIENTATION: LOWER
ORIENTATION: RIGHT;MID
ORIENTATION: RIGHT;MID
ORIENTATION: RIGHT
ORIENTATION: RIGHT;MID
ORIENTATION: LOWER
ORIENTATION: RIGHT
ORIENTATION: LEFT
ORIENTATION: MID
ORIENTATION: LEFT;UPPER
ORIENTATION: RIGHT
ORIENTATION: RIGHT;MID

## 2019-01-01 ASSESSMENT — PAIN DESCRIPTION - PAIN TYPE
TYPE: ACUTE PAIN
TYPE: SURGICAL PAIN
TYPE: CHRONIC PAIN
TYPE: CHRONIC PAIN
TYPE: SURGICAL PAIN
TYPE: CHRONIC PAIN
TYPE: ACUTE PAIN
TYPE: CHRONIC PAIN
TYPE: ACUTE PAIN;CHRONIC PAIN
TYPE: ACUTE PAIN
TYPE: CHRONIC PAIN
TYPE: ACUTE PAIN
TYPE: CHRONIC PAIN
TYPE: ACUTE PAIN
TYPE: CHRONIC PAIN
TYPE: ACUTE PAIN
TYPE: SURGICAL PAIN
TYPE: ACUTE PAIN
TYPE: ACUTE PAIN
TYPE: ACUTE PAIN;CHRONIC PAIN
TYPE: ACUTE PAIN
TYPE: ACUTE PAIN
TYPE: SURGICAL PAIN
TYPE: CHRONIC PAIN

## 2019-01-01 ASSESSMENT — PAIN - FUNCTIONAL ASSESSMENT
PAIN_FUNCTIONAL_ASSESSMENT: PREVENTS OR INTERFERES SOME ACTIVE ACTIVITIES AND ADLS
PAIN_FUNCTIONAL_ASSESSMENT: 0-10
PAIN_FUNCTIONAL_ASSESSMENT: PREVENTS OR INTERFERES SOME ACTIVE ACTIVITIES AND ADLS
PAIN_FUNCTIONAL_ASSESSMENT: ACTIVITIES ARE NOT PREVENTED
PAIN_FUNCTIONAL_ASSESSMENT: PREVENTS OR INTERFERES SOME ACTIVE ACTIVITIES AND ADLS
PAIN_FUNCTIONAL_ASSESSMENT: 0-10
PAIN_FUNCTIONAL_ASSESSMENT: PREVENTS OR INTERFERES SOME ACTIVE ACTIVITIES AND ADLS

## 2019-01-01 ASSESSMENT — PAIN DESCRIPTION - DESCRIPTORS
DESCRIPTORS: ACHING;DISCOMFORT
DESCRIPTORS: ACHING;CONSTANT
DESCRIPTORS: ACHING
DESCRIPTORS: ACHING;DISCOMFORT
DESCRIPTORS: ACHING;DISCOMFORT
DESCRIPTORS: ACHING;DISCOMFORT;THROBBING
DESCRIPTORS: ACHING;DISCOMFORT
DESCRIPTORS: ACHING;CONSTANT;DISCOMFORT
DESCRIPTORS: ACHING;DISCOMFORT;SORE
DESCRIPTORS: ACHING;CONSTANT;DISCOMFORT
DESCRIPTORS: ACHING;CONSTANT
DESCRIPTORS: ACHING;DISCOMFORT;SORE
DESCRIPTORS: ACHING
DESCRIPTORS: ACHING;CONSTANT;DISCOMFORT
DESCRIPTORS: ACHING;CONSTANT
DESCRIPTORS: ACHING;CONSTANT;DISCOMFORT
DESCRIPTORS: DISCOMFORT;ACHING;SHARP
DESCRIPTORS: ACHING
DESCRIPTORS: ACHING;DISCOMFORT;CONSTANT
DESCRIPTORS: THROBBING
DESCRIPTORS: THROBBING;DISCOMFORT;ACHING
DESCRIPTORS: ACHING;DISCOMFORT
DESCRIPTORS: ACHING;CONSTANT
DESCRIPTORS: ACHING;CONSTANT
DESCRIPTORS: ACHING;DISCOMFORT
DESCRIPTORS: ACHING;CONSTANT;DISCOMFORT
DESCRIPTORS: ACHING;CONSTANT
DESCRIPTORS: ACHING;DULL
DESCRIPTORS: ACHING;CONSTANT
DESCRIPTORS: ACHING;CONSTANT;DISCOMFORT

## 2019-01-01 ASSESSMENT — PAIN DESCRIPTION - PROGRESSION
CLINICAL_PROGRESSION: GRADUALLY IMPROVING
CLINICAL_PROGRESSION: NOT CHANGED
CLINICAL_PROGRESSION: NOT CHANGED
CLINICAL_PROGRESSION: GRADUALLY IMPROVING
CLINICAL_PROGRESSION: NOT CHANGED
CLINICAL_PROGRESSION: NOT CHANGED
CLINICAL_PROGRESSION: GRADUALLY IMPROVING
CLINICAL_PROGRESSION: NOT CHANGED
CLINICAL_PROGRESSION: NOT CHANGED
CLINICAL_PROGRESSION: GRADUALLY IMPROVING
CLINICAL_PROGRESSION: NOT CHANGED
CLINICAL_PROGRESSION: GRADUALLY IMPROVING
CLINICAL_PROGRESSION: GRADUALLY IMPROVING
CLINICAL_PROGRESSION: NOT CHANGED
CLINICAL_PROGRESSION: GRADUALLY IMPROVING
CLINICAL_PROGRESSION: GRADUALLY IMPROVING
CLINICAL_PROGRESSION: NOT CHANGED
CLINICAL_PROGRESSION: GRADUALLY WORSENING
CLINICAL_PROGRESSION: NOT CHANGED
CLINICAL_PROGRESSION: GRADUALLY IMPROVING
CLINICAL_PROGRESSION: NOT CHANGED
CLINICAL_PROGRESSION: GRADUALLY IMPROVING

## 2019-01-01 ASSESSMENT — PAIN DESCRIPTION - LOCATION
LOCATION: KNEE
LOCATION: ABDOMEN;CHEST;BACK
LOCATION: BACK
LOCATION: ABDOMEN;CHEST
LOCATION: BACK
LOCATION: KNEE
LOCATION: BACK
LOCATION: KNEE
LOCATION: CHEST
LOCATION: ABDOMEN;CHEST
LOCATION: ABDOMEN
LOCATION: KNEE
LOCATION: ABDOMEN
LOCATION: ABDOMEN;KNEE
LOCATION: ABDOMEN
LOCATION: KNEE
LOCATION: ABDOMEN;KNEE
LOCATION: BACK;ABDOMEN
LOCATION: ABDOMEN;CHEST
LOCATION: BACK
LOCATION: ABDOMEN
LOCATION: BACK
LOCATION: ABDOMEN;CHEST
LOCATION: FLANK;ABDOMEN

## 2019-01-01 ASSESSMENT — PAIN DESCRIPTION - ONSET
ONSET: ON-GOING
ONSET: GRADUAL
ONSET: ON-GOING

## 2019-01-01 ASSESSMENT — PAIN DESCRIPTION - FREQUENCY
FREQUENCY: CONTINUOUS
FREQUENCY: INTERMITTENT
FREQUENCY: CONTINUOUS
FREQUENCY: INTERMITTENT
FREQUENCY: CONTINUOUS
FREQUENCY: INTERMITTENT
FREQUENCY: CONTINUOUS
FREQUENCY: INTERMITTENT
FREQUENCY: CONTINUOUS
FREQUENCY: INTERMITTENT
FREQUENCY: INTERMITTENT
FREQUENCY: CONTINUOUS
FREQUENCY: INTERMITTENT
FREQUENCY: CONTINUOUS
FREQUENCY: INTERMITTENT
FREQUENCY: CONTINUOUS
FREQUENCY: CONTINUOUS

## 2019-01-01 ASSESSMENT — COPD QUESTIONNAIRES: CAT_SEVERITY: MODERATE

## 2019-01-01 ASSESSMENT — LIFESTYLE VARIABLES
SMOKING_STATUS: 1
SMOKING_STATUS: 1

## 2019-01-21 PROBLEM — C49.A2 GASTROINTESTINAL STROMAL TUMOR (GIST) OF STOMACH (HCC): Status: ACTIVE | Noted: 2019-01-01

## 2019-02-12 PROBLEM — K31.89 MASS OF STOMACH: Status: ACTIVE | Noted: 2019-01-01

## 2019-03-29 NOTE — ED NOTES
Pt ambulated to bathroom oxygen saturation dropped to 87% on RA, pt placed on 2 L NC oxygen saturation up to 94%.      Ángel Lincoln RN  03/29/19 3182

## 2019-03-29 NOTE — ED PROVIDER NOTES
This is an 80-year-old female with a past medical history of CHF, hypertension and diabetes mellitus who presents to the ED for evaluation of shortness of breath. Patient remarks that 1 week ago she was taken off her Lasix pill and remarks that for the past one week she's been having increasing shortness of breath. She states her shortness of breath is worsened with exertion and activity and improved with rest. She states that she uses oxygen with two liters at her baseline. The patient states that she has gained ten pounds over the course of one week. She states that she has noticed increased swelling of her leg. She denies any new cough, fever or chills. The patient states that she was told by her pulmonolgist to come to the ED for admission. The history is provided by the patient. No  was used. Review of Systems   Constitutional: Positive for activity change. Negative for fever. HENT: Negative for congestion. Eyes: Negative for visual disturbance. Respiratory: Positive for cough and shortness of breath. Cardiovascular: Positive for leg swelling. Negative for chest pain. Gastrointestinal: Negative for abdominal pain. Endocrine: Negative for polyuria. Genitourinary: Negative for dysuria. Musculoskeletal: Negative for back pain. Skin: Negative for rash. Neurological: Negative for headaches. Hematological: Does not bruise/bleed easily. Psychiatric/Behavioral: Negative for confusion. Physical Exam   Constitutional: She is oriented to person, place, and time. She appears well-developed and well-nourished. HENT:   Head: Normocephalic and atraumatic. Mouth/Throat: Oropharynx is clear and moist.   Eyes: EOM are normal.   Neck: Normal range of motion. Neck supple. No JVD present. Cardiovascular: Normal rate and regular rhythm. Pulmonary/Chest:   Crackles at bases with expiratory wheezing   Abdominal: Soft. She exhibits no mass. There is no tenderness. There is no guarding. Musculoskeletal:   Trace edema bilaterally   Neurological: She is alert and oriented to person, place, and time. No cranial nerve deficit. Skin: Skin is warm and dry. Psychiatric: She has a normal mood and affect. Her behavior is normal.   Nursing note and vitals reviewed. Procedures    MDM  Number of Diagnoses or Management Options  Congestive heart failure, unspecified HF chronicity, unspecified heart failure type [facility]):   Diagnosis management comments: This is an 80year old female with a PMH Of CHF, DM and GERD who presented to the ED for evaluation of shortness of breath. The patient stated that last week she was taken off her Diuretic and has noticed an increase in her shortness of breath and weight gain. The patient uses two liters of oxygen at her baseline and notes that with this on she feels well. The patient did have suspicious findings of a mass on her CXR however I discussed this at length and the patient states that she already knew of these findings and has an appointment with the [facility] next month for further evaluation. The patient did have a slight elevated on her BNP but unremarkable EKG. She was treated Duoneb here in the ED and given one dose of Lasix. The patient was walked here in the ED on her home oxygen and maintained stable oxygenation and HR. I had a long discussion with the patient and offered inpatient admission however she was adamant about follow up outpatient with her PCP. The patient was advised to return to the ED if she developed any increasing chest pain, shortness of breath or any other concerns.  He was agreeable to plan.             --------------------------------------------- PAST HISTORY ---------------------------------------------  Past Medical History:  has a past medical history of Arthritis, Diabetes mellitus type 2, controlled ([address] Full dentures, Gastric mass, GERD (gastroesophageal reflux disease), Glaucoma, Hiatal hernia, and HTN (hypertension), benign. Past Surgical History:  has a past surgical history that includes gastroplasty (1970's); other surgical history (11/2018); joint replacement (Right, 2013); Hysterectomy (1960); hernia repair (1980's); Appendectomy; Upper gastrointestinal endoscopy (N/A, 1/21/2019); Colonoscopy (N/A, 1/21/2019); Cholecystectomy; Endoscopy, colon, diagnostic; gastrectomy (N/A, 2/12/2019); and tumor removal (Bilateral, 02/12/2019). Social History:  reports that she has been smoking cigarettes. She has a 48.75 pack-year smoking history. She has never used smokeless tobacco. She reports that she drinks alcohol. She reports that she does not use drugs. Family History: family history includes Diabetes in her mother; Stroke in her mother. The patients home medications have been reviewed. Allergies: Demerol hcl [meperidine];  Antivert [meclizine]; and Iv [iodides]    -------------------------------------------------- RESULTS -------------------------------------------------  Labs:  Results for orders placed or performed during the hospital encounter of 03/29/19   CBC   Result Value Ref Range    WBC 6.7 4.5 - 11.5 E9/L    RBC 4.25 3.50 - 5.50 E12/L    Hemoglobin 11.4 (L) 11.5 - 15.5 g/dL    Hematocrit 35.8 34.0 - 48.0 %    MCV 84.2 80.0 - 99.9 fL    MCH 26.8 26.0 - 35.0 pg    MCHC 31.8 (L) 32.0 - 34.5 %    RDW 15.5 (H) 11.5 - 15.0 fL    Platelets 788 416 - 765 E9/L    MPV 8.4 7.0 - 12.0 fL   Comprehensive Metabolic Panel   Result Value Ref Range    Sodium 145 132 - 146 mmol/L    Potassium 4.3 3.5 - 5.0 mmol/L    Chloride 103 98 - 107 mmol/L    CO2 28 22 - 29 mmol/L    Anion Gap 14 7 - 16 mmol/L    Glucose 120 (H) 74 - 99 mg/dL    BUN 16 8 - 23 mg/dL    CREATININE 0.7 0.5 - 1.0 mg/dL    GFR Non-African American >60 >=60 mL/min/1.73    GFR African American >60     Calcium 9.5 8.6 - 10.2 mg/dL    Total Protein 7.1 6.4 - 8.3 g/dL    Alb 3.7 3.5 - 5.2 g/dL    Total Bilirubin <0.2 0.0 - 1.2 mg/dL    Alkaline Phosphatase 78 35 - 104 U/L    ALT 8 0 - 32 U/L    AST 12 0 - 31 U/L   Troponin   Result Value Ref Range    Troponin <0.01 0.00 - 0.03 ng/mL   Brain Natriuretic Peptide   Result Value Ref Range    Pro- 0 - 450 pg/mL       Radiology:  XR CHEST STANDARD (2 VW)   Final Result   ALERT:  THIS IS AN ABNORMAL REPORT   1. Findings remain highly suspicious for a left central primary lung   malignancy involving the left hilar left midlung and left infrahilar   regions. 2. Bibasilar airspace disease and bilateral pleural effusions,   suspected to reflect a right basilar atelectasis versus   infiltrate/pneumonia or a possible mass noted as measured on series 3   image 45 the right lung base, with suspected left postobstructive   pneumonitis/pneumonia and left pleural effusion.          ------------------------- NURSING NOTES AND VITALS REVIEWED ---------------------------  Date / Time Roomed:  3/29/2019  4:17 PM  ED Bed Assignment:  14B/14B-14    The nursing notes within the ED encounter and vital signs as below have been reviewed. /75   Pulse 93   Temp 97.7 °F (36.5 °C)   Resp 18   Ht 5' 3.5\" (1.613 m)   Wt 228 lb (103.4 kg)   SpO2 96%   BMI 39.75 kg/m²   Oxygen Saturation Interpretation: Normal      ------------------------------------------ PROGRESS NOTES ------------------------------------------  8:05 PM  I have spoken with the patient and discussed todays results, in addition to providing specific details for the plan of care and counseling regarding the diagnosis and prognosis. Their questions are answered at this time and they are agreeable with the plan. I discussed at length with them reasons for immediate return here for re evaluation. They will followup with their PCP.    --------------------------------- ADDITIONAL PROVIDER NOTES ---------------------------------  At this time the patient is without objective evidence of an acute process requiring hospitalization or inpatient management. They have remained hemodynamically stable throughout their entire ED visit and are stable for discharge with outpatient follow-up. The plan has been discussed in detail and they are aware of the specific conditions for emergent return, as well as the importance of follow-up. New Prescriptions    ALBUTEROL SULFATE HFA (PROAIR HFA) 108 (90 BASE) MCG/ACT INHALER    Inhale 2 puffs into the lungs every 6 hours as needed for Wheezing       Diagnosis:  1. Congestive heart failure, unspecified HF chronicity, unspecified heart failure type ([facility]        Disposition:  Patient's disposition: Discharge to home  Patient's condition is stable.           Abida Larose,   Resident  03/29/19 2011

## 2019-05-21 PROBLEM — J18.9 PNA (PNEUMONIA): Status: ACTIVE | Noted: 2019-01-01

## 2019-05-21 PROBLEM — C34.90 LUNG CANCER (HCC): Status: ACTIVE | Noted: 2019-01-01

## 2019-05-21 NOTE — ED PROVIDER NOTES
HPI:  5/21/19,   Time: 2:30 AM       Praveena Marquez is a 80 y.o. female presenting to the ED for SOB. Patient states she has a known mass on her lung. She was just evaluated at the [facility] and had bronchoscopy. It was recommended that she get chemotherapy and radiation, she declined. She says she does not want chemotherapy or radiation. However, she was concerned as she was coughing green sputum, and progressively getting more short of breath. She was evaluated by her PCP who called in doxycycline, she did not fill it as she was getting worse. She denies any fever, chills, nausea, vomiting, back pain, change in bowel or bladder, or any other symptoms or complaints. Review of Systems:   Pertinent positives and negatives are stated within HPI, all other systems reviewed and are negative.          --------------------------------------------- PAST HISTORY ---------------------------------------------  Past Medical History:  has a past medical history of Arthritis, Diabetes mellitus type 2, controlled ([address] Full dentures, Gastric mass, GERD (gastroesophageal reflux disease), Glaucoma, Hiatal hernia, and HTN (hypertension), benign. Past Surgical History:  has a past surgical history that includes gastroplasty (1970's); other surgical history (11/2018); joint replacement (Right, 2013); Hysterectomy (1960); hernia repair (1980's); Appendectomy; Upper gastrointestinal endoscopy (N/A, 1/21/2019); Colonoscopy (N/A, 1/21/2019); Cholecystectomy; Endoscopy, colon, diagnostic; gastrectomy (N/A, 2/12/2019); and tumor removal (Bilateral, 02/12/2019). Social History:  reports that she has been smoking cigarettes. She has a 48.75 pack-year smoking history. She has never used smokeless tobacco. She reports that she drinks alcohol. She reports that she does not use drugs. Family History: family history includes Diabetes in her mother; Stroke in her mother.      The patients home medications have been reviewed. Allergies: Demerol hcl [meperidine]; Antivert [meclizine]; and Iv [iodides]        ---------------------------------------------------PHYSICAL EXAM--------------------------------------    Constitutional/General: Alert and oriented x3, well appearing, non toxic in NAD  Head: Normocephalic and atraumatic  Eyes: PERRL, EOMI, conjunctive normal, sclera non icteric  Mouth: Oropharynx clear, handling secretions, no trismus, no asymmetry of the posterior oropharynx or uvular edema  Neck: Supple, full ROM, non tender to palpation in the midline, no stridor, no crepitus, no meningeal signs  Respiratory: Diminished on the left   Cardiovascular:  Regular rate. Regular rhythm. No murmurs, gallops, or rubs. 2+ distal pulses  Chest: No chest wall tenderness  GI:  Abdomen Soft, Non tender, Non distended. +BS. No organomegaly, no palpable masses,  No rebound, guarding, or rigidity. Musculoskeletal: Moves all extremities x 4. Warm and well perfused, no clubbing, cyanosis, or edema. Capillary refill <3 seconds  Integument: skin warm and dry. No rashes. Lymphatic: no lymphadenopathy noted  Neurologic: GCS 15, no focal deficits, symmetric strength 5/5 in the upper and lower extremities bilaterally  Psychiatric: Normal Affect    -------------------------------------------------- RESULTS -------------------------------------------------  I have personally reviewed all laboratory and imaging results for this patient. Results are listed below.      LABS:  Results for orders placed or performed during the hospital encounter of 05/20/19   CBC Auto Differential   Result Value Ref Range    WBC 17.9 (H) 4.5 - 11.5 E9/L    RBC 4.45 3.50 - 5.50 E12/L    Hemoglobin 11.9 11.5 - 15.5 g/dL    Hematocrit 36.0 34.0 - 48.0 %    MCV 80.9 80.0 - 99.9 fL    MCH 26.7 26.0 - 35.0 pg    MCHC 33.1 32.0 - 34.5 %    RDW 15.9 (H) 11.5 - 15.0 fL    Platelets 667 537 - 654 E9/L    MPV 9.8 7.0 - 12.0 fL    Neutrophils % 77.6 43.0 - 80.0 % Lymphocytes % 12.9 (L) 20.0 - 42.0 %    Monocytes % 3.4 2.0 - 12.0 %    Eosinophils % 5.2 0.0 - 6.0 %    Basophils % 0.4 0.0 - 2.0 %    Neutrophils # 14.14 (H) 1.80 - 7.30 E9/L    Lymphocytes # 2.33 1.50 - 4.00 E9/L    Monocytes # 0.54 0.10 - 0.95 E9/L    Eosinophils # 0.93 (H) 0.05 - 0.50 E9/L    Basophils # 0.00 0.00 - 0.20 E9/L    Myelocytes Relative 0.9 0 - 0 %    Polychromasia 1+     Hypochromia 1+     Poikilocytes 1+     Ovalocytes 1+    Comprehensive Metabolic Panel   Result Value Ref Range    Sodium 133 132 - 146 mmol/L    Potassium 4.4 3.5 - 5.0 mmol/L    Chloride 98 98 - 107 mmol/L    CO2 24 22 - 29 mmol/L    Anion Gap 11 7 - 16 mmol/L    Glucose 121 (H) 74 - 99 mg/dL    BUN 25 (H) 8 - 23 mg/dL    CREATININE 0.9 0.5 - 1.0 mg/dL    GFR Non-African American >60 >=60 mL/min/1.73    GFR African American >60     Calcium 9.5 8.6 - 10.2 mg/dL    Total Protein 6.8 6.4 - 8.3 g/dL    Alb 3.0 (L) 3.5 - 5.2 g/dL    Total Bilirubin 0.3 0.0 - 1.2 mg/dL    Alkaline Phosphatase 142 (H) 35 - 104 U/L    ALT 21 0 - 32 U/L    AST 49 (H) 0 - 31 U/L   Troponin   Result Value Ref Range    Troponin <0.01 0.00 - 0.03 ng/mL   Brain Natriuretic Peptide   Result Value Ref Range    Pro- 0 - 450 pg/mL   Lactic Acid, Plasma   Result Value Ref Range    Lactic Acid 1.5 0.5 - 2.2 mmol/L   EKG 12 Lead   Result Value Ref Range    Ventricular Rate 97 BPM    Atrial Rate 97 BPM    P-R Interval 140 ms    QRS Duration 76 ms    Q-T Interval 314 ms    QTc Calculation (Bazett) 398 ms    P Axis 19 degrees    R Axis 1 degrees    T Axis 30 degrees       RADIOLOGY:  Interpreted by Radiologist.  XR CHEST PORTABLE    (Results Pending)       EKG: This EKG is signed and interpreted by the EP. Sinus, rate 97, no STEMI      ------------------------- NURSING NOTES AND VITALS REVIEWED ---------------------------   The nursing notes within the ED encounter and vital signs as below have been reviewed by myself.   /61   Pulse 99   Temp 99.7 °F (37.6 °C) (Temporal)   Resp 20   Ht 5' 3\" (1.6 m)   Wt 219 lb (99.3 kg)   SpO2 97%   BMI 38.79 kg/m²   Oxygen Saturation Interpretation: Normal    The patients available past medical records and past encounters were reviewed. ------------------------------ ED COURSE/MEDICAL DECISION MAKING----------------------  Medications   doxycycline (VIBRAMYCIN) 100 mg in dextrose 5 % 100 mL IVPB (has no administration in time range)   0.9 % sodium chloride bolus (1,000 mLs Intravenous New Bag 5/21/19 0157)   0.9 % sodium chloride infusion (has no administration in time range)   cefTRIAXone (ROCEPHIN) 1 g in dextrose 5 % 50 mL IVPB (vial-mate) (1 g Intravenous New Bag 5/21/19 0157)         ED COURSE:       Medical Decision Making:    Labs and imaging reviewed. Chest x-ray shows worsening infiltrate on the left. Given her leukocytosis, lungs or sputum, patient was started on antibiotics. Blood cultures were obtained. Patient was admitted. Counseling: The emergency provider has spoken with the patient and discussed todays results, in addition to providing specific details for the plan of care and counseling regarding the diagnosis and prognosis. Questions are answered at this time and they are agreeable with the plan.       --------------------------------- IMPRESSION AND DISPOSITION ---------------------------------    IMPRESSION  1. Pneumonia due to organism        DISPOSITION  Disposition: Admit to telemetry  Patient condition is stable    NOTE: This report was transcribed using voice recognition software.  Every effort was made to ensure accuracy; however, inadvertent computerized transcription errors may be present        Virgilio Duran MD  05/21/19 [id number]

## 2019-05-21 NOTE — H&P
[id number]  [address]  History and Physical      CHIEF COMPLAINT:  sob*      HISTORY OF PRESENT ILLNESS:      The patient is a 80 y.o. female patient of Dr Kee Avelar who presents with SOB . PT w hx GIST sp resection recently dx lunc CA at [facility] w worsening SOB. Pt declined chemo and radiation previously. Patient's been having worsening cough productive of yellow sputum. Denies fevers or chills. No exacerbation or relief. Patient reiterates that she is not interested in chemo or radiation. Past Medical History:    Past Medical History:   Diagnosis Date    Arthritis     Diabetes mellitus type 2, controlled ([address] 11/23/2018    treats with diet and exercise    Full dentures     Gastric mass     GERD (gastroesophageal reflux disease)     Glaucoma     bilateral    Hiatal hernia     HTN (hypertension), benign 11/23/2018       Past Surgical History:    Past Surgical History:   Procedure Laterality Date    APPENDECTOMY      CHOLECYSTECTOMY      COLONOSCOPY N/A 1/21/2019    COLONOSCOPY POLYPECTOMY SNARE/COLD BIOPSY performed by Magalie Hanley MD at [address] [place] [address], DIAGNOSTIC      GASTRECTOMY N/A 2/12/2019    EXPLORATORY LAPAROTOMY WITH GASTRIC MASS RESECTION AND POSSIBLE RECONSTRUCTION -- EPIDURAL performed by Magalie Hanley MD at [facility] GASTROPLASTY  1970's    for weight loss    HERNIA REPAIR  1980's    ventral done x 2    HYSTERECTOMY  1960    JOINT REPLACEMENT Right 2013    knee    OTHER SURGICAL HISTORY  11/2018    Thorencentesis    TUMOR REMOVAL Bilateral 02/12/2019    middle of stomach .     UPPER GASTROINTESTINAL ENDOSCOPY N/A 1/21/2019    EGD BIOPSY performed by Magalie Hanley MD at [facility] ENDOSCOPY       Medications Prior to Admission:    Medications Prior to Admission: Cyanocobalamin (VITAMIN B 12 PO), Take by mouth daily  docusate sodium (COLACE, DULCOLAX) 100 MG CAPS, Take 100 mg by mouth daily  Cholecalciferol (VITAMIN D3 PO), Take by mouth daily LD 1/17/2019  Coenzyme Q10 (CO Q 10 PO), Take by mouth daily LD 1/7/2019  amLODIPine (NORVASC) 2.5 MG tablet, Take 2.5 mg by mouth daily Instructed to take morning of scope with a sip of water  metoprolol succinate (TOPROL XL) 25 MG extended release tablet, Take 25 mg by mouth daily Instructed to take morning of scope with a sip of water  triamterene-hydrochlorothiazide (MAXZIDE-25) 37.5-25 MG per tablet, Take 1 tablet by mouth daily  latanoprost (XALATAN) 0.005 % ophthalmic solution, Place 1 drop into both eyes nightly  dorzolamide (TRUSOPT) 2 % ophthalmic solution, Place 1 drop into both eyes 2 times daily    Allergies:    Demerol hcl [meperidine]; Antivert [meclizine]; and Iv [iodides]    Social History:    reports that she has been smoking cigarettes. She has a 48.75 pack-year smoking history. She has never used smokeless tobacco. She reports that she drinks alcohol. She reports that she does not use drugs. Family History:   family history includes Diabetes in her mother; Stroke in her mother. REVIEW OF SYSTEMS:  As above in the HPI, otherwise negative    PHYSICAL EXAM:    Vitals:  /71   Pulse 96   Temp 97.5 °F (36.4 °C) (Temporal)   Resp 22   Ht 5' 3.5\" (1.613 m)   Wt 219 lb (99.3 kg)   SpO2 96%   BMI 38.19 kg/m²     General:  Awake, alert, oriented X 3. Well developed, well nourished, well groomed. No apparent distress. HEENT:  Normocephalic, atraumatic. Pupils equal, round, reactive to light. No scleral icterus. No conjunctival injection. Normal lips, teeth, and gums. No nasal discharge. Neck:  Supple  Heart:  RRR, no murmurs, gallops, rubs  Lungs: Rhonchi bilaterally, bilat symmetrical expansion, no wheeze, rales,   Abdomen:   Bowel sounds present, soft, nontender, no masses, no organomegaly, no peritoneal signs  Extremities:  No clubbing, cyanosis, or edema  Skin:  Warm and dry, no open lesions or rash  Neuro:  Cranial nerves 2-12 intact, no focal deficits  Breast: deferred  Rectal: deferred  Genitalia:  deferred    LABS:    CBC with Differential:    Lab Results   Component Value Date    WBC 17.9 05/20/2019    RBC 4.45 05/20/2019    HGB 11.9 05/20/2019    HCT 36.0 05/20/2019     05/20/2019    MCV 80.9 05/20/2019    MCH 26.7 05/20/2019    MCHC 33.1 05/20/2019    RDW 15.9 05/20/2019    LYMPHOPCT 12.9 05/20/2019    MONOPCT 3.4 05/20/2019    MYELOPCT 0.9 05/20/2019    BASOPCT 0.4 05/20/2019    MONOSABS 0.54 05/20/2019    LYMPHSABS 2.33 05/20/2019    EOSABS 0.93 05/20/2019    BASOSABS 0.00 05/20/2019     CMP:    Lab Results   Component Value Date     05/20/2019    K 4.4 05/20/2019    K 5.0 02/20/2019    CL 98 05/20/2019    CO2 24 05/20/2019    BUN 25 05/20/2019    CREATININE 0.9 05/20/2019    GFRAA >60 05/20/2019    LABGLOM >60 05/20/2019    GLUCOSE 121 05/20/2019    GLUCOSE 74 04/30/2012    PROT 6.8 05/20/2019    LABALBU 3.0 05/20/2019    LABALBU 4.2 04/30/2012    CALCIUM 9.5 05/20/2019    BILITOT 0.3 05/20/2019    ALKPHOS 142 05/20/2019    AST 49 05/20/2019    ALT 21 05/20/2019     Magnesium:    Lab Results   Component Value Date    MG 2.2 02/19/2019     Phosphorus:    Lab Results   Component Value Date    PHOS 3.4 02/19/2019     PT/INR:    Lab Results   Component Value Date    PROTIME 12.2 02/18/2019    INR 1.1 02/18/2019     Last 3 Troponin:    Lab Results   Component Value Date    TROPONINI <0.01 05/20/2019    TROPONINI <0.01 03/29/2019    TROPONINI <0.01 11/22/2018     U/A:  No results found for: NITRITE, COLORU, PROTEINU, PHUR, LABCAST, WBCUA, RBCUA, MUCUS, TRICHOMONAS, YEAST, BACTERIA, CLARITYU, SPECGRAV, LEUKOCYTESUR, UROBILINOGEN, BILIRUBINUR, BLOODU, GLUCOSEU, AMORPHOUS  HgBA1c:    Lab Results   Component Value Date    LABA1C 6.3 11/22/2018     FLP:    Lab Results   Component Value Date    TRIG 74 06/07/2014    HDL 43 06/07/2014    LDLCALC 94 06/07/2014    LABVLDL 15 06/07/2014     TSH:    Lab Results   Component Value Date    TSH 2.090 06/07/2014       XR CHEST PORTABLE   Final Result   ALERT:  THIS IS AN ABNORMAL REPORT   1. Near complete opacification of the left hemithorax, findings   continue to remain suspicious for a mass/malignancy involving the left   hilar/left midlung and left infrahilar region, please see CT chest   3/15/2019. Suspected underlying left pleural effusion and associated   airspace disease   2. Right basilar airspace disease, findings can be seen in atelectasis   and/or developing infiltrate/pneumonia. ASSESSMENT:      Principal Problem:    PNA (pneumonia)  Active Problems:    HTN (hypertension), benign    Diabetes mellitus type 2, controlled ([address]    Morbid obesity ([address]    Lung cancer ([address]  Resolved Problems:    * No resolved hospital problems.  *      PLAN:    Admit  Nebulizers  IV ABX  proCalcitonin   Sputum culture  Blood culture  Urine legionella and strep Ag  Medications for other co morbidities cont as appropriate w dosage adjustments as necessary  PT/OT  DVT PPx  DC planning       Electronically signed by Dagmar Horne MD on 5/21/2019 at 1:14 PM

## 2019-05-21 NOTE — ED NOTES
Bed: 22  Expected date:   Expected time:   Means of arrival:   Comments:  [id number], RN  05/20/19 [id number]

## 2019-05-21 NOTE — PROGRESS NOTES
declined further amb due to level of fatigue. pt was agreeable to remain seated in bedside chair. Pt was left with call button within reach and all needs met.  at bedside. Patient education  Pt educated on PT role, safety with mobility, transfer technique, gait training and postural reducation. Patient response to education:   Pt verbalized understanding Pt demonstrated skill Pt requires further education in this area   Yes Requires assist/VCs Yes     Rehab potential is Good for reaching above PT goals. Pts/ family goals   1. To return to baseline. Patient and or family understand(s) diagnosis, prognosis, and plan of care. PLAN  PT care will be provided in accordance with the objectives noted above. Whenever appropriate, clear delegation orders will be provided for nursing staff. Exercises and functional mobility practice will be used as well as appropriate assistive devices or modalities to obtain goals. Patient and family education will also be administered as needed. Frequency of treatments will be 2-5x/week x 7-10 days.     Time in: 1410  Time out: [address] Mya Martinez DPT  License [id number]

## 2019-05-21 NOTE — PROGRESS NOTES
Occupational Therapy  OCCUPATIONAL THERAPY INITIAL EVALUATION      Date:2019  Patient Name: Jr Willis  MRN: [id number]  : 1934  Room: [address] [place], [place] [address]B        Evaluating OT: Javy Whitmore OTR/L [id number]    AM-PAC Daily Activity Raw Score:     Recommended Adaptive Equipment: TBD     Diagnosis: Pneumonia    PMHX: recent dx Lung CA (declined chemo and radiation)   Precautions:  Falls, O2     Home Living: Pt lives with  and Dtr in a 2-story house with Ramp Entry. Bedroom on the 2nd floor - Flight w/ 1 HR. Full Bathrooms on 1st and 2nd floors   Bathroom setup:  Tub-Shower and [employer] - Both   Equipment owned:  [address], FWW, Extended tub Bench, Grab Bars, w/c   [employer] Family Assist:   and Dtr can assist PRN   Prior Level of Function:  Patient reports recently requiring increase assist with ADLs.   Using w/wlker for mobility       Pain Level: no pain ;   Cognition:  Oriented,alert and conversing     Judgement/safety:  Fair      Functional Assessment:   Initial Eval Status  Date: 19 Tx Session   Date:  Short Term Goals  Treatment frequency: PRN   Feeding Independent      Grooming SBA/set-up ,seated   Mod I    UB Dressing SBA/set-up   Mod I    LB Dressing Mod A   Mod I    Bathing      Toileting Assist with thorough hygiene      Bed Mobility  Sitting EOB upon entry      Functional Transfers SBA  Sit-stand from bed, chair   Mod I    Functional Mobility SBA,w/walker   Side steps next to bed   Mod I with good tolerance    Balance Sitting:     Static:  Independent     Standing: SBA     Activity Tolerance Fair-  SOB limiting factor   Patient aware   Good with ADL activity    Visual/  Perceptual Glasses: reading                    Strength ROM Additional Info:    RUE  3+/5  WFL good  and wfl FMC/dexterity noted during ADL tasks       LUE 3+/5  WFL good  and wfl FMC/dexterity noted during ADL tasks         Hearing: [facility] [place]   Sensation:  No c/o numbness or tingling Comments/Treatment: Upon arrival, patient sititng EOB . At end of session, patient sitting in chair  with call light and phone within reach, all lines and tubes intact. Pt would benefit from continued skilled OT to increase safety and independence with completion of ADL/IADL tasks for functional independence and quality of life. Eval Complexity: Low    Assessment of current deficits   Functional mobility [x]  ADLs [x] Strength [x]  Cognition []  Functional transfers  [x] IADLs [x] Safety Awareness [x]  Endurance [x]  Fine Motor Coordination [] Balance [x] Vision/perception [] Sensation []   Gross Motor Coordination [] ROM [] Delirium []                  Motor Control []    Plan of Care:   ADL retraining [x]   Equipment needs [x]   Neuromuscular re-education [x] Energy Conservation Techniques [x]  Functional Transfer training [x] Patient and/or Family Education [x]  Functional Mobility training [x]  Environmental Modifications [x]  Cognitive re-training []   Compensatory techniques for ADLs [x]  Splinting Needs []   Positioning to improve overall function [x]   Therapeutic Activity [x]  Therapeutic Exercise  [x]  Visual/Perceptual: []    Delirium prevention/treatment  []   Other:  []    Rehab Potential: Good for established goals     Patient / Family Goal: return home       Patient and/or family were instructed on functional diagnosis, prognosis/goals and OT plan of care. Demonstrated good  understanding.         Evaluation time includes thorough review of current medical information, gathering information on past medical history/social history and prior level of function, completion of standardized testing/informal observation of tasks, assessment of data, and development of POC/Goals      Keyon Carr OTR/L [id number]

## 2019-05-21 NOTE — CARE COORDINATION
Social Work /Discharge Planning:    Pt presents to the ED secondary to shortness of breath. Pt is from home. SW met with pt and her . Pt was alert and oriented x3, on oxygen. Pt reports she wears oxygen at home, 2L [facility] DME) at night only. Pt reports she recently went to [facility] and was diagnosed with lung cancer. Pt stated she has been having trouble breathing since Nov 2018. Pt, , and daughter, live in a 2 story home with a ramp at the entrance. Pt reported no DME and no recent falls. Pt has hx with [facility]. Pt reported no hx of EVELIO. Pt's PCP is Dr Malena Garber. Pt reports plan will be to return home. Pt reports if [facility] orders are written, she would like [facility]. Initial referral made to [facility]. Assigned SW to follow.

## 2019-05-21 NOTE — ED NOTES
Food provided along with drinks.  wishes to remain at bedside.       Serafin Lundborg, RN  05/21/19 5957

## 2019-05-22 NOTE — CONSULTS
Palliative Care Department    Palliative Care Consult      Chief Complaint: Anjel Pina is a 80 y.o. female withchief complaint of left lateral chest pain and shortness of breath. Assessment/Plan        Active Hospital Problems    Diagnosis Date Noted    PNA (pneumonia) [J18.9] 05/21/2019    Lung cancer ([facility] [C34.90] 05/21/2019    Diabetes mellitus type 2, controlled ([facility] [E11.9] 11/23/2018    HTN (hypertension), benign [I10] 11/23/2018    Mor[facility] ([facility] [E66.01] 11/23/2018       Palliative Care Goals of Care/Code Status:   - Introduced palliative medicine and its role in the patient's care. Patient stated that she was diagnosed with lung cancer in November after having a gastric surgery in November 2018. Presented with worsening shortness of breath on this admission. In discussing goals of care, the patient stated that [facility] [employer]  [facility] will cure me\". She is out right refusing chemotherapy/radiation treatment or oncologic evaluation at this time. She is aware that her cancer is significant in her left lung, and I did review her CAT scan images with her. Empathy was offered. She feels that Yarsanism and will cure her as it did with her breast cancer several years ago. She would like her cough evaluated and taken care of and eventually go home with her . He is not interested in hospice at this time. - On review of code status, the patient does not want any resuscitative measures in the event of a cardiopulmonary arrest. She does not want CPR, cardioversion, cardiac resuscitative medications, or intubation/ventilatory support. She was made a DNR-CCA with no intubation.   - On symptom management, the patient continues to have left lower lateral rib cage pain that appears to be secondary to her cancer. I started the patient on MSIR 7.5 mg every 4 hours when necessary moderate to severe pain.  I asked the nurse doses appropriate, so that long-term MS Contin can be started in the future, once we are aware of how much she can tolerate of the MSIR. Senna S was also prescribed when necessary constipation.   - The patient will need to follow in the palliative care clinic after discharge. - Palliative care social work was asked to see the patient for living will and DPOA for healthcare paperwork. The palliative medicine team will continue to follow for goals of care and code status as patient condition changes. - The patient asked that we not talk about her disease process in her room if other family members are present. Time/Communication  Time In: 0900  Time Out: 1015    Greater than 51% of time spent, total 75 minutes in counseling and coordination of care at the bedside regardinggoals of care, symptom management, diagnosis and prognosis and see above. Discharge planning: follow up palliative care clinic    Referrals to: palliative care clinic    Discussed patient and the plan of care with the other IDT members of Palliative Care, and with , patient and floor nurse. Symptom Management:    Symptom Medications Number Doses in Past 24 hrs   Pain MS IR 7.5 mg Q4H PRN    Nausea/vomiting     Bowel Regime/constipation Senna-S 2 QD PRN    Anxiety/agitation/depression     SOB     Appetite       Current Medications:  Inpatient medications reviewed: yes. Home Medications reviewed:  yes. 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 She complains of significant pain in the left lateral rib cage that is \"stabbing and pinching\" on movement.

## 2019-05-22 NOTE — CONSULTS
She was offered radiation and chemotherapy which she has refused. She tells me that she is not going to seek treatment for her lung cancer. Although she seems very overwhelmed with this diagnosis and the prognosis without treatment. I did speak with her at length about palliative care and assistance with goals of care symptom management, and support. She is open to meeting with them. At this point we will treat her for her pneumonia likely post obstructive. Past Medical History:   Diagnosis Date    Arthritis     Diabetes mellitus type 2, controlled ([address] 11/23/2018    treats with diet and exercise    Full dentures     Gastric mass     GERD (gastroesophageal reflux disease)     Glaucoma     bilateral    Hiatal hernia     HTN (hypertension), benign 11/23/2018       Past Surgical History:   Procedure Laterality Date    APPENDECTOMY      CHOLECYSTECTOMY      COLONOSCOPY N/A 1/21/2019    COLONOSCOPY POLYPECTOMY SNARE/COLD BIOPSY performed by Magalie Hanley MD at [address] [place] [address] COLON, DIAGNOSTIC      GASTRECTOMY N/A 2/12/2019    EXPLORATORY LAPAROTOMY WITH GASTRIC MASS RESECTION AND POSSIBLE RECONSTRUCTION -- EPIDURAL performed by Magalie Hanley MD at [place] [address] GASTROPLASTY  1970's    for weight loss    HERNIA REPAIR  1980's    ventral done x 2    HYSTERECTOMY  1960    JOINT REPLACEMENT Right 2013    knee    OTHER SURGICAL HISTORY  11/2018    Thorencentesis    TUMOR REMOVAL Bilateral 02/12/2019    middle of stomach .    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   Social Needs    Financial resource strain: Not on file    Food insecurity:     Worry: Not on file     Inability: Not on file   [employer] needs:     Medical: Not on file     Non-medical: Not on file   Tobacco Use    Smoking status: Current Every Day Smoker     Packs/day: 0.75     Years: 65.00     Pack years: 48.75     Types: Cigarettes    Smokeless tobacco: Never Used    Tobacco comment: 6cpd   Substance and Sexual Activity    Alcohol use: Yes     Comment: rare    Drug use: No    Sexual activity: Not on file   Lifestyle    Physical activity:     Days per week: Not on file     Minutes per session: Not on file    Stress: Not on file   Relationships    Social connections:     Talks on phone: Not on file     Gets together: Not on file     Attends Bahai service: Not on file     Active member of club or organization: Not on file     Attends meetings of clubs or organizations: Not on file     Relationship status: Not on file    Intimate partner violence:     Fear of current or ex partner: Not on file     Emotionally abused: Not on file     Physically abused: Not on file     Forced sexual activity: Not on file   Other Topics Concern    Not on file   Social History Narrative    Not on file      Social History:  reports that she has been smoking cigarettes. She has a 48.75 pack-year smoking history. She has never used smokeless tobacco. She reports that she drinks alcohol. She reports that she does not use drugs.     Family History: family history includes Diabetes in her mother; Stroke in her mother. ETOH:   reports that she drinks alcohol.          Immunization History   Administered Date(s) Administered    Pneumococcal Polysaccharide ([id number]) 08/12/2014    Pneumococcal Vaccine, unspecified formulation 10/07/2018        Home Meds: Medications Prior to Admission: Cyanocobalamin (VITAMIN B 12 PO), Take by mouth daily  docusate sodium (COLACE, DULCOLAX) 100 MG CAPS, Take 100 mg by mouth daily  Cholecalciferol (VITAMIN D3 PO), Take by mouth daily LD 1/17/2019  Coenzyme Q10 (CO Q 10 PO), Take by mouth daily LD 1/7/2019  amLODIPine (NORVASC) 2.5 MG tablet, Take 2.5 mg by mouth daily Instructed to take morning of scope with a sip of water  metoprolol succinate (TOPROL XL) 25 MG extended release tablet, Take 25 mg by mouth daily Instructed to take morning of scope with a sip of water  triamterene-hydrochlorothiazide (MAXZIDE-25) 37.5-25 MG per tablet, Take 1 tablet by mouth daily  latanoprost (XALATAN) 0.005 % ophthalmic solution, Place 1 drop into both eyes nightly  dorzolamide (TRUSOPT) 2 % ophthalmic solution, Place 1 drop into both eyes 2 times daily    CURRENT MEDS :  Scheduled Meds:   vancomycin  1,500 mg Intravenous Once    [START ON 5/23/2019] vancomycin  1,250 mg Intravenous Q24H    amLODIPine  2.5 mg Oral Daily    docusate sodium  100 mg Oral Daily    dorzolamide  1 drop Both Eyes BID    latanoprost  1 drop Both Eyes Nightly    metoprolol succinate  50 mg Oral Daily    pantoprazole  40 mg Oral QAM AC    triamterene-hydrochlorothiazide  1 tablet Oral Daily    albuterol  2.5 mg Nebulization 4x daily    sodium chloride flush  10 mL Intravenous 2 times per day    enoxaparin  40 mg Subcutaneous Daily    insulin lispro  0-6 Units Subcutaneous TID WC    insulin lispro  0-3 Units Subcutaneous Nightly    albuterol  2.5 mg Nebulization Q4H WA    cefepime  2 g Intravenous Q12H       Continuous Infusions:   sodium chloride Stopped (05/21/19 0421)    sodium chloride 50 mL/hr at 05/21/19 2130    dextrose         Allergies   Allergen Reactions    Demerol Hcl [Meperidine] Shortness Of Breath and Palpitations    Antivert [Meclizine]      Slurred speech, slows mental process    Iv [Iodides] Nausea And Vomiting     Heart races       REVIEW OF SYSTEMS:  Constitutional: Denies fever, weight loss, night sweats, and fatigue  Skin: Denies pigmentation, dark lesions, and rashes   HEENT: Denies hearing loss, tinnitus, ear drainage, epistaxis, sore throat, and hoarseness. Cardiovascular: Denies palpitations, chest pain, and chest pressure. Respiratory: +cough, +dyspnea at rest, _hemoptysis, apnea, and choking. Gastrointestinal: Denies nausea, vomiting, poor appetite, diarrhea, heartburn or reflux  Genitourinary: Denies dysuria, frequency, urgency or hematuria  Musculoskeletal: Denies myalgias, muscle weakness, and bone pain  Neurological: Denies dizziness, vertigo, headache, and focal weakness  Psychological: Denies anxiety and depression  Endocrine: Denies heat intolerance and cold intolerance  Hematopoietic/Lymphatic: Denies bleeding problems and blood transfusions    OBJECTIVE:   /60   Pulse 99   Temp 98.3 °F (36.8 °C) (Oral)   Resp 20   Ht 5' 3.5\" (1.613 m)   Wt 198 lb 9.6 oz (90.1 kg)   SpO2 97%   BMI 34.63 kg/m²   SpO2 Readings from Last 1 Encounters:   05/22/19 97%        I/O:    Intake/Output Summary (Last 24 hours) at 5/22/2019 0955  Last data filed at 5/22/2019 0529  Gross per 24 hour   Intake 1655 ml   Output 1 ml   Net 1654 ml      Physical Exam:  General: The patient is lying in bed comfortably without any distress. Breathing is not labored  HEENT: Pupils are equal round and reactive to light, there are no oral lesions and no post-nasal drip   Neck: supple without adenopathy  Cardiovascular: regular rate and rhythm without murmur or gallop  Respiratory: Clear to auscultation bilaterally without wheezing or crackles diminished on the left,  Air entry is symmetric  Abdomen: soft, non-tender, non-distended, normal bowel sounds  Extremities: warm, no edema, no clubbing  Skin: no rash or lesion  Neurologic: CN II-XII grossly intact, no focal deficits      Imaging personally reviewed:  5/20      Comparison: 3/29/2019       Findings:  There is a stable, enlarged cardiomediastinal silhouette with near complete opacification of the left lung. Right basilar   airspace disease. Remona Mellow No pneumothorax. Possible left pleural effusion   with associated airspace disease.           Impression   ALERT:  THIS IS AN ABNORMAL REPORT   1. Near complete opacification of the left hemithorax, findings   continue to remain suspicious for a mass/malignancy involving the left   hilar/left midlung and left infrahilar region, please see CT chest   3/15/2019. Suspected underlying left pleural effusion and associated   airspace disease   2. Right basilar airspace disease, findings can be seen in atelectasis   and/or developing infiltrate/pneumonia. Echo:    FINAL DIAGNOSIS  A. BRONCHIAL,#22 EBUS,TBNA,4R FINE NEEDLE ASPIRATE (THINPREP, SMEARS AND  CELL BLOCK)  Positive for malignant cells. Poorly differentiated non-small cell carcinoma. See comment. Comment: The malignant cells are positive for AE1/3 and negative for Napsin  A, TTF-1, and p40. Dr. Marixa Gonzalez has reviewed this case and agrees with the  above interpretation. This sample has been sent for molecular evaluation  for lung NGS. B. LINGULA BRONCHOALVEOLAR LAVAGE  Negative for malignant cells. Laboratory Developed Test (LDT) Disclaimer:  Positive and negative controls stain appropriately. Performance  characteristics of immunohistochemical, immunofluorescent and chromogenic  in-situ hybridization tests have been determined by [employer]. [facility] and [address] [place], [place] [address] ([facility]) in  a manner consistent with CLIA requirements. One or more of these tests have  not been cleared or approved by the FDA. [facility] is regulated under CLIA as  qualified to perform high-complexity testing. These tests are used for  clinical purposes. They should not be regarded as investigational or for  research.     Labs:  Lab Results   Component Value Date    WBC 17.7 05/22/2019    HGB 10.3 05/22/2019    HCT 31.7 05/22/2019    MCV 81.3 05/22/2019    MCH 26.4 05/22/2019    MCHC 32.5 05/22/2019    RDW 15.8 05/22/2019     05/22/2019    MPV 9.2 05/22/2019     Lab Results   Component Value Date     05/22/2019    K 3.7 05/22/2019     05/22/2019    CO2 22 05/22/2019    BUN 22 05/22/2019    CREATININE 0.7 05/22/2019    LABALBU 3.0 05/20/2019    LABALBU 4.2 04/30/2012    CALCIUM 8.9 05/22/2019    GFRAA >60 05/22/2019    LABGLOM >60 05/22/2019     Lab Results   Component Value Date    PROTIME 12.2 02/18/2019    INR 1.1 02/18/2019     Recent Labs     05/20/19  2359   PROBNP 246     Recent Labs     05/20/19  2359   TROPONINI <0.01     Recent Labs     05/20/19  2359   PROCAL 0.49*     This SmartLink has not been configured with any valid records. Micro:  Recent Labs     05/21/19  0443   STREPNEUMAGU Presumptive NEGATIVE for Pneumococcal pneumonia, suggesting  no current or recent pneumococcal infection. Infection due  to S. pneumoniae cannot be ruled out since the antigen present  in the sample may be below the detection limit of the test.       Recent Labs     05/21/19  0443   LP1UAG Presumptive NEGATIVE suggesting no recent or current infections  with Legionella pneumophilia serogroup 1. Infection to  Legionella cannot be ruled out since other serogroups and  species may cause infection, antigen may not be present in  early infection, or level of antigen may be below the  detection limit. Assessment:  1. Acute respiratory failure requiring 2 L nasal cannula  2. Pneumonia likely post obstructive due to tumor  3. Lung cancer-currently untreated  4. Bacteremia gram positive cocci      Plan:  1. ID consult   2. Urine antigens negative   , respiratory culture , respiratory panel , pro calcitonin . 49 , proBNP 246  3. Palliative care consult, discussed with patient at length her goals of care which are to not receive treatment for her current cancer diagnosis. She  is agreeable to palliative care consult.   4. Continue antibiotic therapy-cefepime and vanco   5. Continue bronchodilator therapy every 4 hours while awake  6. Add incentive spirometry and flutter valve      Thank you for allowing me to participate in the care of Nina Snowden. Please feel free to call with questions. This plan of care was reviewed in collaboration with Dr. Luis Enrique Martinez     Electronically signed by LIZBETH Salazar on 5/22/2019 at 9:55 AM    I personally saw, examined, and cared for the patient. Labs, medications, radiographs reviewed. I agree with history exam and plans detailed in NP note with the following additions:    Ms. Damian Lanes is an 80year old female known to my partner Dr. Jennifer Loving for [address] for which she has decided against treatment. She presented to the ED with increased SOB with exertion, cough, and malaise. She was found to have a worsening L sided infiltrate and gram positive bacteremia. Will treat her for pneumonia - infectious disease has been consulted. Bronchodilators. Will involve the palliative care team for symptom management.      Electronically signed by Hipolito Duque MD on 5/23/2019 at 10:27 AM

## 2019-05-22 NOTE — PROGRESS NOTES
Subjective:  Feeling better No CP, SOB, F, V, D, P   Patient is feeling much better and is very appreciative of the help she has received. Objective:    /66   Pulse 92   Temp 97 °F (36.1 °C) (Temporal)   Resp 18   Ht 5' 3.5\" (1.613 m)   Wt 198 lb 9.6 oz (90.1 kg)   SpO2 96%   BMI 34.63 kg/m²     24HR INTAKE/OUTPUT:      Intake/Output Summary (Last 24 hours) at 5/22/2019 0751  Last data filed at 5/22/2019 0529  Gross per 24 hour   Intake 1835 ml   Output 1 ml   Net 1834 ml     nad  Heart:  RRR, no murmurs, gallops, or rubs. Lungs:  CTA bilaterally, no wheeze, rales or rhonchi  Abd: bowel sounds present, nontender, nondistended, no masses  Extrem:  No clubbing, cyanosis, or edema    Most Recent Labs  Lab Results   Component Value Date    WBC 17.7 (H) 05/22/2019    HGB 10.3 (L) 05/22/2019    HCT 31.7 (L) 05/22/2019     05/22/2019     05/20/2019    K 4.4 05/20/2019    CL 98 05/20/2019    CREATININE 0.9 05/20/2019    BUN 25 (H) 05/20/2019    CO2 24 05/20/2019    GLUCOSE 121 (H) 05/20/2019    ALT 21 05/20/2019    AST 49 (H) 05/20/2019    INR 1.1 02/18/2019    TSH 2.090 06/07/2014    LABA1C 6.3 (H) 11/22/2018     No results for input(s): MG in the last 72 hours. Lab Results   Component Value Date    CALCIUM 9.5 05/20/2019    PHOS 3.4 02/19/2019        XR CHEST PORTABLE   Final Result   ALERT:  THIS IS AN ABNORMAL REPORT   1. Near complete opacification of the left hemithorax, findings   continue to remain suspicious for a mass/malignancy involving the left   hilar/left midlung and left infrahilar region, please see CT chest   3/15/2019. Suspected underlying left pleural effusion and associated   airspace disease   2. Right basilar airspace disease, findings can be seen in atelectasis   and/or developing infiltrate/pneumonia.           Assessment    Principal Problem:    PNA (pneumonia)  Active Problems:    HTN (hypertension), benign    Diabetes mellitus type 2, controlled ([facility]    Morbid obesity ([facility]    Lung cancer ([facility]  Resolved Problems:    * No resolved hospital problems.  *      Plan:    Admit  Nebulizers  IV ABX-coverage for resistant gram negatives and MRSA  proCalcitonin  0.49  Sputum culture  Blood culture 2/2 + GPC clusters  ID cs  Urine legionella and strep Ag  Medications for other co morbidities cont as appropriate w dosage adjustments as necessary  PT/OT  DVT PPx  DC planning         Electronically signed by Neville Ortiz MD on 5/22/2019 at 7:51 AM

## 2019-05-22 NOTE — PROGRESS NOTES
Pharmacy Consultation Note  (Antibiotic Dosing and Monitoring)    Initial consult date: 2019  Consulting physician: Dr. Alfreda Vega  Drug(s): vancomycin  Indication: PNA, RLL  Age/Gender IBW DW  Allergy Information   84 y.o./F; 161.3 cm, 90.1 kg 55.2 kg 69 kg  Demerol hcl [meperidine]; Antivert [meclizine]; and Iv [iodides]             Date  WBC BUN/CR Drug/Dose Time   Given Level(s)   (Time) Comments    17.7 22/0.7 Vancomycin 1500 mg x1 0935                                  Estimated Creatinine Clearance: 64 mL/min (based on SCr of 0.7 mg/dL). Intake/Output Summary (Last 24 hours) at 2019 1343  Last data filed at 2019 0810  Gross per 24 hour   Intake 1895 ml   Output 1 ml   Net 1894 ml       Temp max: Temp (24hrs), Av.8 °F (36.6 °C), Min:97 °F (36.1 °C), Max:98.3 °F (36.8 °C)    Cultures:  available culture and sensitivity results were reviewed in EPIC    Blood (x2 sets): Set 1 = CoNS ( @ 1208)                                                    Set 2 = GPC in clusters ( @ 0748)    Assessment:  · Consulted by Dr. Omar Ram to dose/monitor vancomycin. · Goal trough level:  15-20 mCg/mL. · 85 yo/F admitted for SOB. CXR shows L lung mass (known lung CA since 2018) and RLL PNA. · Empiric ATBs in ED (ceftriaxone and doxycycline); cefepime and vanco started on admission. · ID has been consulted. Will d/w ID if Pharmacy to continue vanco consult. Plan:  · Start vancomycin 1250 mg q24h on  @ 1000. · Will check vancomycin trough level when steady state is attained if vanco continues and Pharmacy continues the consult. · Pharmacist will follow and monitor/adjust dosing as necessary. Suly Penny PharmD  2019  1:56 PM  Pager: [phone]    ID will dose the vancomycin. D/w Dr. Tato Santiago. Pharmacy sign off.     Suly Penny PharmD  2019  2:01 PM  Pager: [phone]

## 2019-05-22 NOTE — PROGRESS NOTES
Palliative Medicine Social Work     Patient Name: Kt Bearden  Age: 80 y.o. [place] Status: no    Next of Sabrina Mcclendon  [phone]                 Additional Support: daughter Quique Milan [phone]    Minor Children: no    Advanced Directives: completed today, appoints her  Raquel York as main agent, and her daughter Quique Milan. Living Will also completed. Confirm Code Status: [id number]    Current Goals of Care: maintain function/ quality of life, remain at home and continue current management    Mental Health History: no    Substance Abuse:no    Indications of Abuse/Neglect: no    Financial Concerns: no    Living Situation: Pt, , and daughter, live in a 2 story home with a ramp at the entrance. She has home o2 with [facility]    Physical Care Needs Met: yes    Emotional Needs Met: time spent exploring pts thoughts and feelings, providing support through active listening and validation of feelings. She spoke about having always been a caregiver, with her job as a health care aid, and caring for her parents and siblings who have passed away. Her  and daughter are supportive of her needs     Future Care Needs/Goals/Anticipatory Guidance: continued discussion about end of life care     Referral Needs: to be determined, future hospice      Assessment: 79 yo  female, seen along with her  to complete HCPOA and Living Will. She was recently diagnosed with lung canecr, she is not interested in Chemo or radiation. Pt feels support from her  and their daughter. Contact information provided for future needs.

## 2019-05-22 NOTE — CONSULTS
[address] [place] [address] [facility]    Consultation Note     Admit Date: 5/20/2019 11:02 PM    Reason for Consult:  [facility] bacteremia    Attending Physician:  Ronen Parmar MD       Chief Complaint   Patient presents with    Shortness of Breath     started 3-4 days ago. recent dx with lung CA at [facility]. Dr Satish Wilson called in script for doxy which she just started today but pt isn't sure why. History Obtained From:  For a detailed history please see previous and current available medical records. HISTORY OF PRESENT ILLNESS:             The patient is a 80 y.o. female admitted with SOB. She recently had diagnosis of lung cancer (NSLC poorly differentiated) at [facility] last month and had declined chemotherapy and radiation. Now admitted with SOB and was found to have L hemithorax opacification and effusion. BC showed [facility] and ID was consulted. Past Medical History:        Diagnosis Date    Arthritis     Diabetes mellitus type 2, controlled ([address] 11/23/2018    treats with diet and exercise    Full dentures     Gastric mass     GERD (gastroesophageal reflux disease)     Glaucoma     bilateral    Hiatal hernia     HTN (hypertension), benign 11/23/2018     Past Surgical History:        Procedure Laterality Date    APPENDECTOMY      CHOLECYSTECTOMY      COLONOSCOPY N/A 1/21/2019    COLONOSCOPY POLYPECTOMY SNARE/COLD BIOPSY performed by Taylor Parisi MD at [address] [place] [address], COLON, DIAGNOSTIC      GASTRECTOMY N/A 2/12/2019    EXPLORATORY LAPAROTOMY WITH GASTRIC MASS RESECTION AND POSSIBLE RECONSTRUCTION -- EPIDURAL performed by Taylor Parisi MD at [facility] GASTROPLASTY  1970's    for weight loss    HERNIA REPAIR  1980's    ventral done x 2    HYSTERECTOMY  1960    JOINT REPLACEMENT Right 2013    knee    OTHER SURGICAL HISTORY  11/2018    Thorencentesis    TUMOR REMOVAL Bilateral 02/12/2019    middle of stomach .     UPPER GASTROINTESTINAL ENDOSCOPY N/A 1/21/2019    EGD BIOPSY performed by Sammy Light MD at [facility] ENDOSCOPY     Current Medications:   Scheduled Meds:   [START ON 5/23/2019] vancomycin  1,250 mg Intravenous Q24H    gabapentin  100 mg Oral TID    amLODIPine  2.5 mg Oral Daily    docusate sodium  100 mg Oral Daily    dorzolamide  1 drop Both Eyes BID    latanoprost  1 drop Both Eyes Nightly    metoprolol succinate  50 mg Oral Daily    triamterene-hydrochlorothiazide  1 tablet Oral Daily    albuterol  2.5 mg Nebulization 4x daily    sodium chloride flush  10 mL Intravenous 2 times per day    enoxaparin  40 mg Subcutaneous Daily    insulin lispro  0-6 Units Subcutaneous TID WC    insulin lispro  0-3 Units Subcutaneous Nightly    albuterol  2.5 mg Nebulization Q4H WA    cefepime  2 g Intravenous Q12H     Continuous Infusions:   sodium chloride Stopped (05/21/19 0421)    sodium chloride 50 mL/hr at 05/21/19 2130    dextrose       PRN Meds:acetaminophen, sennosides-docusate sodium, morphine, calcium carbonate, sodium chloride flush, ondansetron, glucose, dextrose, glucagon (rDNA), dextrose, polyvinyl alcohol    Allergies:  Demerol hcl [meperidine];  Antivert [meclizine]; and Iv [iodides]    Social History:   Social History     Socioeconomic History    Marital status:      Spouse name: None    Number of children: None    Years of education: None    Highest education level: None   Occupational History    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   Social Needs    Financial resource strain: None    Food insecurity:     Worry: None     Inability: None    Transportation needs:     Medical: None     Non-medical: None   Tobacco Use    Smoking status: Current Every Day Smoker     Packs/day: 0.75     Years: 65.00     Pack years: 48.75     Types: Cigarettes    Smokeless tobacco: Never Used    Tobacco comment: 6cpd   Substance and Sexual Activity    Alcohol use: Yes     Comment: rare    Drug use: No    Sexual activity: None   Lifestyle    Physical activity:     Days per week: None     Minutes per session: None    Stress: None   Relationships    Social connections:     Talks on phone: None     Gets together: None     Attends [employer] service: None     Active member of club or organization: None     Attends meetings of clubs or organizations: None     Relationship status: None    Intimate partner violence:     Fear of current or ex partner: None     Emotionally abused: None     Physically abused: None     Forced sexual activity: None   Other Topics Concern    None   Social History Narrative    None       Family History:       Problem Relation Age of Onset    Diabetes Mother     Stroke Mother    . Otherwise non-pertinent to the chief complaint.     REVIEW OF SYSTEMS:    14 ROS negative unless otherwise specified in the HPI    PHYSICAL EXAM:    Vitals:    /60   Pulse 99   Temp 98.3 °F (36.8 °C) (Oral)   Resp 20   Ht 5' 3.5\" (1.613 m)   Wt 198 lb 9.6 oz (90.1 kg)   SpO2 97%   BMI 34.63 kg/m²     General Appearance:    Alert, cooperative, no distress, appears stated age   Head:    Normocephalic, without obvious abnormality, atraumatic   Eyes:    PERRL, conjunctiva/corneas clear      Ears:    Normal and external ear canals, both ears   Nose:   Nares normal, septum midline, mucosa normal, no drainage    or sinus tenderness   Throat:   Lips, mucosa, and tongue normal; teeth and gums normal   Neck:   Supple, trachea midline, no adenopathy; no JVD   Lungs:    L side diminished, respirations unlabored   Chest wall:    No tenderness or deformity   Heart:    Regular rate and rhythm, S1 and S2 normal, no murmur, rub   or gallop   Abdomen:     Soft, non-tender, bowel sounds active all four quadrants,     no masses, no organomegaly   Extremities:   Extremities normal, atraumatic, no cyanosis or edema   Pulses:   2+ and symmetric all extremities   Skin:   Skin color, texture, turgor normal, no rashes or lesions       CBC+dif:  Reviewed and trend followed    Radiology:  Noted    Microbiology:  Pending  Recent Labs     05/20/19  2359   BC Gram stain performed from blood culture bottle media  Gram positive cocci in clusters  Coagulase negative Staph by PNA fish  *     No results for input(s): ORG in the last 72 hours. Recent Labs     05/20/19  2359   BLOODCULT2 Gram stain performed from blood culture bottle media  Gram positive cocci in clusters  *     Recent Labs     05/21/19  0443   STREPNEUMAGU Presumptive NEGATIVE for Pneumococcal pneumonia, suggesting  no current or recent pneumococcal infection. Infection due  to S. pneumoniae cannot be ruled out since the antigen present  in the sample may be below the detection limit of the test.       Recent Labs     05/21/19  0443   LP1UAG Presumptive NEGATIVE suggesting no recent or current infections  with Legionella pneumophilia serogroup 1. Infection to  Legionella cannot be ruled out since other serogroups and  species may cause infection, antigen may not be present in  early infection, or level of antigen may be below the  detection limit. No results for input(s): ASO in the last 72 hours. No results for input(s): CULTRESP in the last 72 hours. Blood cultures:  Lab Results   Component Value Date    [facility]  05/20/2019     Gram stain performed from blood culture bottle media  Gram positive cocci in clusters  Coagulase negative Staph by PNA fish       Assessment:  · Sepsis L sided post obstructive pneumonia vs worsening malignancy with effusion  · GPC bactermia  · NSCLC poorly differentiated- never treated    Plan:    · Cont iv vancomycin and cefepime  · F/u [facility]  · Monitor labs  · Will follow with you    Thank you for having us see this patient in consultation. I will be discussing this case with the treating physicians.     Electronically signed by Edgardo Hooker MD on 5/22/2019 at 4:28 PM

## 2019-05-22 NOTE — PROGRESS NOTES
Occupational Therapy    Date:2019  Patient Name: Ascencion Chambers  MRN: [id number]  : 1934  Room: [address] [place] [address]      Reviewed chart and attempted to treat pt however pt declined to recent medical news received. Will attempt at later date/time.          Aravind Vargas [address], [address] [facility]

## 2019-05-23 NOTE — PROGRESS NOTES
Subjective:  Feeling better No CP, SOB, F, V, D, P     Objective:    /60   Pulse 96   Temp 98.2 °F (36.8 °C) (Temporal)   Resp 18   Ht 5' 3.5\" (1.613 m)   Wt 198 lb 9.6 oz (90.1 kg)   SpO2 97%   BMI 34.63 kg/m²     24HR INTAKE/OUTPUT:      Intake/Output Summary (Last 24 hours) at 5/23/2019 0837  Last data filed at 5/23/2019 0618  Gross per 24 hour   Intake 1866 ml   Output 450 ml   Net 1416 ml     nad  Heart:  RRR, no murmurs, gallops, or rubs. Lungs:  CTA bilaterally, no wheeze, rales or rhonchi  Abd: bowel sounds present, nontender, nondistended, no masses  Extrem:  No clubbing, cyanosis, or edema    Most Recent Labs  Lab Results   Component Value Date    WBC 16.3 (H) 05/23/2019    HGB 10.9 (L) 05/23/2019    HCT 34.3 05/23/2019     05/23/2019     05/23/2019    K 3.7 05/23/2019     05/23/2019    CREATININE 0.8 05/23/2019    BUN 23 05/23/2019    CO2 23 05/23/2019    GLUCOSE 104 (H) 05/23/2019    ALT 21 05/20/2019    AST 49 (H) 05/20/2019    INR 1.1 02/18/2019    TSH 2.090 06/07/2014    LABA1C 6.3 (H) 11/22/2018     No results for input(s): MG in the last 72 hours. Lab Results   Component Value Date    CALCIUM 9.1 05/23/2019    PHOS 3.4 02/19/2019        XR CHEST PORTABLE   Final Result   ALERT:  THIS IS AN ABNORMAL REPORT   1. Near complete opacification of the left hemithorax, findings   continue to remain suspicious for a mass/malignancy involving the left   hilar/left midlung and left infrahilar region, please see CT chest   3/15/2019. Suspected underlying left pleural effusion and associated   airspace disease   2. Right basilar airspace disease, findings can be seen in atelectasis   and/or developing infiltrate/pneumonia.           Assessment    Principal Problem:    Pneumonia due to organism  Active Problems:    HTN (hypertension), benign    Diabetes mellitus type 2, controlled ([address]    Morbid obesity ([address]    Lung cancer ([address]    Goals of care, counseling/discussion    Cancer

## 2019-05-23 NOTE — PROGRESS NOTES
Palliative Care Department  Palliative Care Progress Note  [facility] to Consult: Hospital Day: 4    Referring Provider:  Jazmin NIEVES    Reason for Consult:  [x]  Code status Discussion  [x]  Assist with goals of care  [x]  Psychosocial support  [x]  Symptom Management      Chief Complaint: left lateral chest pain and shortness of breath    Subjective:   5/23/19  Patient is lying in bed with her  at bedside. She reports she requested Tylenol because she feels it works better to alleviate her pain than the morphine. She did try 2 doses of MSIR 7.5 mg yesterday. She describes her pain on the right side, wrapping around exacerbated by movement. It's not exacerbated by deep breaths or cough. He can be alleviated at times with appropriate position. She reports no adverse side effects to medications currently. 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 She complains of significant pain in the left lateral rib cage that is \"stabbing and pinching\" on movement. She also states that she gets very winded with significant ambulation. She is denying any headache or blurred vision, ear pain or sore throat, neck or back pain, chest palpitations, abdominal pain, nausea or vomiting, diarrhea, hematemesis, hematochezia, melena, hemoptysis, or extremity discomfort or swelling. She stated that she is going to get better with her Confucianism and that she does not want any radiation or chemotherapy for her cancer. She is aware of the significance of her left lung mass.  She is not agitated and hospice at this time. She is requesting a do not resuscitate-comfort care arrest with NO Intubation.     Goals of care: provide comfort care/support/palliation/relieve suffering, remain at home, preserve independence/autonomy/control and continue current management  Advance Directives: DNR-CCA - completed  Surrogate:Spouse  Prognosis: unknown  Spiritual assessment: community-based clergy involved  Bereavement and grief: to be determined.     Past Medical History:   Diagnosis Date    Arthritis     Diabetes mellitus type 2, controlled ([facility] 11/23/2018    treats with diet and exercise    Full dentures     Gastric mass     GERD (gastroesophageal reflux disease)     Glaucoma     bilateral    Hiatal hernia     HTN (hypertension), benign 11/23/2018       Past Surgical History:   Procedure Laterality Date    APPENDECTOMY      CHOLECYSTECTOMY      COLONOSCOPY N/A 1/21/2019    COLONOSCOPY POLYPECTOMY SNARE/COLD BIOPSY performed by Ken Barriga MD at [address] [place] [address], COLON, DIAGNOSTIC      GASTRECTOMY N/A 2/12/2019    EXPLORATORY LAPAROTOMY WITH GASTRIC MASS RESECTION AND POSSIBLE RECONSTRUCTION -- EPIDURAL performed by Ken Barriga MD at [facility] GASTROPLASTY  1970's    for weight loss    HERNIA REPAIR  1980's    ventral done x 2   [facility] [address]    JOINT REPLACEMENT Right 2013    knee    OTHER SURGICAL HISTORY  11/2018    Thorencentesis    TUMOR REMOVAL Bilateral 02/12/2019    middle of stomach .     UPPER GASTROINTESTINAL ENDOSCOPY N/A 1/21/2019    EGD BIOPSY performed by Ken Barriga MD at [address]       History obtain from EMR    Current Medications:  Inpatient medications reviewed: yes  Home Medications reviewed: yes    Allergies   Allergen Reactions    Demerol Hcl [Meperidine] Shortness Of Breath and Palpitations    Antivert [Meclizine]      Slurred speech, slows mental process    Iv [Iodides] Nausea And Vomiting Heart races       Objective:   PHYSICAL EXAM:   Vitals:    BP 97/66   Pulse 90   Temp 97.8 °F (36.6 °C) (Temporal)   Resp 16   Ht 5' 3.5\" (1.613 m)   Wt 198 lb 9.6 oz (90.1 kg)   SpO2 99%   BMI 34.63 kg/m²   Gen: Obese, NAD, awake, alert and very pleasant  HEENT: normocephalic, atraumatic, PERRL, sclera anicteric, conjunctiva pink and moist  Neck: supple  Lungs: Coarse breath sounds throughout, patient coughing and producing yellow sputum, diminished breath sounds on left compared to right  Heart: regular rate and rhythm, distant heart tones  Abdomen: normoactive bowel sounds, soft, non-tender, exam inhibited due to body habitus   Extremities: no clubbing, cyanosis or edema, moving all extremities    Skin: warm, dry without rashes, lesions, bruising  Neuro: awake, alert, oriented x 3, follows commands, no gross neurologic deficits    Results/Verification of Data Review  Objective data reviewed: labs, images, records, medication use, vitals and chart    Assessment/Plan      Active Hospital Problems    Diagnosis Date Noted    Goals of care, counseling/discussion [Z71.89]     Cancer associated pain [G89.3]     Palliative care encounter [Z51.5]     Pneumonia due to organism [J18.9] 05/21/2019    [facility] [C34.90] 05/21/2019    Diabetes mellitus type [facility], [facility] [E11.9] 11/23/2018    HTN (hypertension), benign [I10] 11/23/2018    Morbid obesity ([facility] [E66.01] 11/23/2018         Non-small cell lung cancer  -  Patient does not wish to pursue treatments, will follow and help establish goals of care during hospitalization and identify support needed on discharge    Pain associated with neoplasm:  -  Patient obtaining relief from pain with Tylenol, continue current regimen with MSIR 7.5 mg also as needed in case the pain he comes worse.  econdary to her cancer. I started the patient on MSIR 7.5 mg every 4 hours when necessary moderate to severe pain.  I asked the nurse doses appropriate, so that long-term MS Contin can be started in the future, once we are aware of how much she can tolerate of the MSIR. Constipation:  -  Senna S at bedtime    Palliative Care Encounter/Recommendations:  5/23/19  Support provided to patient and  at bedside through empathy and active listening to their life review. Patient is happy currently with her pain control and would like to avoid opioids if possible. 5/22/19  Introduced palliative medicine and its role in the patient's care. Patient stated that she was diagnosed with lung cancer in November after having a gastric surgery in November 2018. Presented with worsening shortness of breath on this admission. In discussing goals of care, the patient stated that [facility] will cure me\". She is out right refusing chemotherapy/radiation treatment or oncologic evaluation at this time. She is aware that her cancer is significant in her left lung, and I did review her CAT scan images with her. Empathy was offered. She feels that Jewish and will cure her as it did with her breast cancer several years ago. She would like her cough evaluated and taken care of and eventually go home with her . He is not interested in hospice at this time. The patient asked that we not talk about her disease process in her room if other family members are present. Family Meeting:  Participants:Spouse and patient  Family meeting was held to discuss:goals of care, symptom management and discharge plan     Discharge planning: to be determined    Patient meets criteria for general inpatient hospice care including the following: N/A- Palliative Care Patient    Data in Support of Terminal Illness:N/A Palliative Patient. Discussed patient and the plan of care with the other IDT members of Palliative Care and Dr. Junior Palacio, and with patient, family and floor nurse.     Referrals to: none today    Time/Communication  Greater than 51% of time spent, total 35 minutes in

## 2019-05-23 NOTE — PROGRESS NOTES
Callum Posey M.D.,[facility]  Belinda Reddy D.O., F.A.C.O.I., Skip Cano M.D. Erma Price M.D., Janice Bautista M.D. Daily Pulmonary Progress Note    Patient:  Alexus Devries 80 y.o. female MRN: [id number]     Date of Service: 5/23/2019      Synopsis     We are following patient for hypoxia , lung cancer     \"CC\" SOB , cough     Code status:dnr-cca       Subjective      Patient was seen and examined. She is laying bed, she reports SOB has improved slightly , but she is feeling unwell today because she feels her BP was too low this am. She still has productive cough , small amount yellow sputum. No labored breathing . Denies pain this am , tells me that she doesn't want to take morphine because it has made her mouth very dry . Review of Systems:  Constitutional: Denies fever, weight loss, night sweats, and fatigue  Skin: Denies pigmentation, dark lesions, and rashes   HEENT: Denies hearing loss, tinnitus, ear drainage, epistaxis, sore throat, and hoarseness. Cardiovascular: Denies palpitations, chest pain, and chest pressure. Respiratory: Denies cough, dyspnea at rest, hemoptysis, apnea, and choking.   Gastrointestinal: Denies nausea, vomiting, poor appetite, diarrhea, heartburn or reflux  Genitourinary: Denies dysuria, frequency, urgency or hematuria    24-hour events:  None     Objective   Vitals: BP 97/66   Pulse 90   Temp 97.8 °F (36.6 °C) (Temporal)   Resp 16   Ht 5' 3.5\" (1.613 m)   Wt 198 lb 9.6 oz (90.1 kg)   SpO2 99%   BMI 34.63 kg/m²     I/O:    Intake/Output Summary (Last 24 hours) at 5/23/2019 1100  Last data filed at 5/23/2019 1027  Gross per 24 hour   Intake 1866 ml   Output 750 ml   Net 1116 ml                        CURRENT MEDS :  Scheduled Meds:   vancomycin  1,250 mg Intravenous Q24H    gabapentin  100 mg Oral TID    amLODIPine  2.5 mg Oral Daily    docusate sodium  100 mg Oral Daily    dorzolamide  1 drop Both Eyes BID    latanoprost  1 drop Both Eyes Nightly    metoprolol succinate  50 mg Oral Daily    triamterene-hydrochlorothiazide  1 tablet Oral Daily    albuterol  2.5 mg Nebulization 4x daily    sodium chloride flush  10 mL Intravenous 2 times per day    enoxaparin  40 mg Subcutaneous Daily    insulin lispro  0-6 Units Subcutaneous TID WC    insulin lispro  0-3 Units Subcutaneous Nightly    albuterol  2.5 mg Nebulization Q4H WA    cefepime  2 g Intravenous Q12H       Physical Exam:  General: The patient is lying in bed comfortably without any distress. Breathing is not labored  HEENT: Pupils are equal round and reactive to light, there are no oral lesions and no post-nasal drip   Neck: supple without adenopathy  Cardiovascular: regular rate and rhythm without murmur or gallop  Respiratory: Clear to auscultation bilaterally without wheezing or crackles diminished on the left,  Air entry is symmetric  Abdomen: soft, non-tender, non-distended, normal bowel sounds  Extremities: warm, no edema, no clubbing  Skin: no rash or lesion  Neurologic: CN II-XII grossly intact, no focal deficits        Pertinent/ New Labs and Imaging Studies     Labs:  Lab Results   Component Value Date    WBC 16.3 05/23/2019    HGB 10.9 05/23/2019    HCT 34.3 05/23/2019    MCV 82.9 05/23/2019    MCH 26.3 05/23/2019    MCHC 31.8 05/23/2019    RDW 15.9 05/23/2019     05/23/2019    MPV 9.7 05/23/2019     Lab Results   Component Value Date     05/23/2019    K 3.7 05/23/2019    K 3.7 05/22/2019     05/23/2019    CO2 23 05/23/2019    BUN 23 05/23/2019    CREATININE 0.8 05/23/2019    LABALBU 3.0 05/20/2019    LABALBU 4.2 04/30/2012    CALCIUM 9.1 05/23/2019    GFRAA >60 05/23/2019    LABGLOM >60 05/23/2019     Lab Results   Component Value Date    PROTIME 12.2 02/18/2019    INR 1.1 02/18/2019     Recent Labs     05/20/19  2359   PROBNP 246     Recent Labs     05/20/19  2359   PROCAL 0.49*     This SmartLink has not been configured with any valid records. Micro: No results for input(s): CULTRESP in the last 72 hours. No results for input(s): LABGRAM in the last 72 hours. No results for input(s): LEGUR in the last 72 hours. Recent Labs     05/21/19  0443   STREPNEUMAGU Presumptive NEGATIVE for Pneumococcal pneumonia, suggesting  no current or recent pneumococcal infection. Infection due  to S. pneumoniae cannot be ruled out since the antigen present  in the sample may be below the detection limit of the test.       Recent Labs     05/21/19  0443   LP1UAG Presumptive NEGATIVE suggesting no recent or current infections  with Legionella pneumophilia serogroup 1. Infection to  Legionella cannot be ruled out since other serogroups and  species may cause infection, antigen may not be present in  early infection, or level of antigen may be below the  detection limit. Assessment:      1. Acute respiratory failure requiring 2 L nasal cannula  2. Pneumonia likely post obstructive due to tumor  3. Lung cancer NSCLC-currently untreated  4. Bacteremia gram positive cocci      Plan:     1. ID -GPC bactermia , continue vancomycin and cefepime   2. Palliative care following    3. Continue bronchodilator therapy every 4 hours while awake  4. Continue  incentive spirometry and flutter valve     This plan of care was reviewed in collaboration with Dr. Janet Torres   Electronically signed by LIZBETH Martínez on 5/23/2019 at 11:00 AM    I personally saw, examined, and cared for the patient. Labs, medications, radiographs reviewed. I agree with history exam and plans detailed in NP note.     Electronically signed by Khoi Dooley MD on 5/23/2019 at 4:16 PM

## 2019-05-23 NOTE — PLAN OF CARE
Problem: Falls - Risk of:  Goal: Will remain free from falls  Description  Will remain free from falls  5/23/2019 0134 by Judith Camp RN  Outcome: Met This Shift  5/22/2019 1245 by Kylee Garcia RN  Outcome: Met This Shift  Goal: Absence of physical injury  Description  Absence of physical injury  5/23/2019 0134 by Judith Camp RN  Outcome: Met This Shift  5/22/2019 1245 by Kylee Garcia RN  Outcome: Met This Shift     Problem: Pain:  Goal: Pain level will decrease  Description  Pain level will decrease  5/23/2019 0134 by Judith Camp RN  Outcome: Met This Shift  5/22/2019 1245 by Kylee Garcia RN  Outcome: Not Met This Shift  Goal: Control of acute pain  Description  Control of acute pain  5/23/2019 0134 by Judith Camp RN  Outcome: Met This Shift  5/22/2019 1245 by Kylee Garcia RN  Outcome: Not Met This Shift  Goal: Control of chronic pain  Description  Control of chronic pain  5/23/2019 0134 by Judith Camp RN  Outcome: Met This Shift  5/22/2019 1245 by Kylee Garcia RN  Outcome: Not Met This Shift     Problem: Airway Clearance - Ineffective:  Goal: Able to breathe comfortably  Description  Able to breathe comfortably     5/23/2019 0134 by Judith Camp RN  Outcome: Met This Shift  5/22/2019 1245 by Kylee Garcia RN  Outcome: Not Met This Shift  Goal: Absence of respiratory distress  Description  Absence of respiratory distress     5/23/2019 0134 by Judith Camp RN  Outcome: Met This Shift  5/22/2019 1245 by Kylee Garcia RN  Outcome: Met This Shift  Goal: Achieve maximum mobility level  Description  Achieve maximum mobility level     5/23/2019 0134 by Judith Camp RN  Outcome: Met This Shift  5/22/2019 1245 by Kylee Garcia RN  Outcome: Met This Shift  Goal: Lung sounds clear or within normal limits for patient  5/23/2019 0134 by Judith Camp RN  Outcome: Met This Shift  5/22/2019 1245 by Kylee Garcia RN  Outcome: Met This Shift

## 2019-05-23 NOTE — PROGRESS NOTES
CC- SOB    Subjective: The patient is awake and alert. Tolerating medications. Reports no side effects. Afebrile. 10 ROS otherwise negative unless otherwise specified above. Objective:    Vitals:    05/23/19 1400   BP: 98/62   Pulse: 90   Resp: 18   Temp: 97.7 °F (36.5 °C)   SpO2:        General Appearance:    Awake, alert , no acute distress. HEENT:    Normocephalic,PERRL,neck supple, no JVD, mucosa moist, no thrush   Lungs:     Clear to auscultation bilaterally, no wheeze , crackles   Heart:    Regular rate and rhythm, no murmur   Abdomen:     Soft, non-tender, not distended  bowel sounds present,   Extremities:   No edema,no open wound,no erythema, non  tender   Skin:   no rashes or lesions   CBC+dif:  Reviewed and trend followed     Radiology:  Noted     Microbiology:  Pending      Recent Labs     05/20/19  2359   BC Gram stain performed from blood culture bottle media  Gram positive cocci in clusters  Coagulase negative Staph by PNA fish  *      No results for input(s): ORG in the last 72 hours. Recent Labs     05/20/19  2359   BLOODCULT2 Gram stain performed from blood culture bottle media  Gram positive cocci in clusters  *          Recent Labs     05/21/19  0443   STREPNEUMAGU Presumptive NEGATIVE for Pneumococcal pneumonia, suggesting  no current or recent pneumococcal infection. Infection due  to S. pneumoniae cannot be ruled out since the antigen present  in the sample may be below the detection limit of the test.             Recent Labs     05/21/19  0443   LP1UAG Presumptive NEGATIVE suggesting no recent or current infections  with Legionella pneumophilia serogroup 1. Infection to  Legionella cannot be ruled out since other serogroups and  species may cause infection, antigen may not be present in  early infection, or level of antigen may be below the  detection limit.         No results for input(s): ASO in the last 72 hours.   No results for input(s): CULTRESP in the last 72 hours. Blood cultures:         Lab Results   Component Value Date     BC   05/20/2019       Gram stain performed from blood culture bottle media  Gram positive cocci in clusters  Coagulase negative Staph by PNA fish         Assessment:  · Sepsis L sided post obstructive pneumonia vs worsening malignancy with effusion  · CONS bacteremia- R knee TKA remote history.  Does not look infected  · NSCLC poorly differentiated- never treated     Plan:    · Cont iv vancomycin and cefepime  · F/u resp culture  · Monitor labs  · Will follow with you            Electronically signed by Piotr Norman MD on 5/23/2019 at 2:46 PM

## 2019-05-24 NOTE — PROGRESS NOTES
Left a message for Dr. Omar Agee regarding Dr. Teresita Hou states patient is ok for discharge. Patient wants to go home with home health care, so we will need orders for home health care.    Anabell Gaytan

## 2019-05-24 NOTE — PROGRESS NOTES
[facility] 6WE IMCU  Physical Therapy Treatment Note  Name: Mitchell Ruiz  : 1934  MRN: [id number]    Date of Service: 2019    Evaluating Therapist: Abraham Daniel PT, DPT  [id number]    Room #: 9142/7186-U  DIAGNOSIS: Pneumonia  PRECAUTIONS: Falls, O2  PMH: Lung Ca, gastric ca s/p resection    Social:  Pt lives her  in a 2 story home with ramped entry. There are 12 steps and B rail/s to second floor bed/bath. Pt can have first floor setup. Prior to admission pt ambulated with no AD until recently using [address] due to decline in mobility. Pt also has a WC. Initial Evaluation  Date:  Treatment: 19 Short Term/ Long Term   Goals   Was pt agreeable to Eval/treatment? Yes yes    Does pt have pain? No  Pt reported discomfort in abdomen due to constipation     Bed Mobility  Rolling: Min A  Supine to sit: Min A  Sit to supine: NT  Scooting: Min A Rolling: SBA  Supine to sit: SBA  Sit to supine: SBA  Scooting: SBA Mod Independent    Transfers Sit to stand: SBA  Stand to sit: SBA  Stand pivot: SBA Sit to stand: SBA  Stand to sit: SBA  Stand pivot: SBA Mod Independent    Ambulation   3 feet using WW with SBA 8 feet forward/backward with ww and SBA >150 feet using AAD with Mod Independent    Stair negotiation:  NT NT  >10 steps using 1 rail with Supervision   ROM RLE: WFL  LLE: [facility]    Strength RLE: 4/5  LLE: 4/5 Grossly 4/5    Balance   Sitting: Independent   Standing: SBA using [address] Sitting: Independent   Standing: SBA using ww    AM-PAC Basic Mobility Inpatient Short Form Raw Score:        Alertness/Orientation: x4  Endurance: fair   Sensation: denied numbness/tingling   Edema/Skin Integrity: B LE edema noted    Pt performed the following therapeutic activities/exercise:   Seated ankle pumps, LAQ, hip flexion, hip abduction/adduction, shoulder press, elbow flexion/extension all x12-20 reps B    STS x 4 reps   Comments: Pt was supine upon PT arrival and agreeable to PT evaluation/treatment.  Pt required increased time to complete mobility due to abdominal discomfort from constipation. Pt educated on benefits of EOB/OOB activity for promoting bowel motility as well as being cued for PLB, posture, and safety with transfers. Pt distance ambulated limited by fatigue and pt request. Tolerated TE well. Pt feels she is at or close to baseline LOF. Recommend HHPT and assist from family as needed at discharge   At end of session pt supine with call light within reach and all needs met. Patient education  Pt was educated on see above     Patient response to education:   Pt verbalized understanding Pt demonstrated skill Pt requires further education in this area   Yes  partial Yes        Time in: 1345  Time out: 1404    Pt is making  progress toward established Physical Therapy goals. Continue with physical therapy current plan of care.     Kimberlyn Mcgowan DPT  License Number: [id number]

## 2019-05-24 NOTE — PROGRESS NOTES
Message sent to Palliative care via [employer] regarding patient states the MSIR every four hours prn is not working and she does not like the medication. Patient requesting something else.    [employer]

## 2019-05-24 NOTE — PROGRESS NOTES
Patient states that she does not want Morphine (MSIR) for pain because it made her \"feel funny\" and stated that she requested from a doctor to have oxycodone instead that normally works for her.

## 2019-05-24 NOTE — CARE COORDINATION
Social work noted patient declined hospice at d/c.  SW followed up with patient in room to discuss SNF, patient adamantly declined. SW discussed benefits of HHC at d/c, patient agreeable. Will need HHC order. Patient declined list and would like [facility], referral made to [facility] and aware of possible dc over weekend. Patient has DME ww, wc, spc, shower chair, and home O2 from [facility]. Patient could benefit from DME bedside commode and would like [facility] DME. Will need DME order, referral made to [facility] and aware to deliver to patient's home. VARUN/CM following.   Electronically signed by DICK Mata on 5/24/2019 at 2:45 PM

## 2019-05-24 NOTE — PROGRESS NOTES
Isidore Bumpers, M.D.,[facility]  Edgardo Fu D.O., F.A.C.O.I., Tommie Disla M.D. Carroll Perez M.D., Jannelle Dubin ,M.D. Daily Pulmonary Progress Note    Patient:  Beka Parra 80 y.o. female MRN: [id number]     Date of Service: 5/24/2019      Synopsis   We are following patient for hypoxia , lung cancer      \"CC\" SOB , cough      Code status:[id number]         Subjective      Patient was seen and examined. Patient is lying on her side in bed. She is complaining of abdominal pain today and constipation. She relates to us that she hasn't had a BM in 5 days. She is ordered a enema, we didn't report this to the nurse that patient would like enema. Her breathing has improved. She denies shortness of breath still complaining of productive cough but it has improved in severity. Upon auscultation patient is much more clear today. Her friend is at bedside      Review of Systems:  Constitutional: Denies fever, weight loss, night sweats, and fatigue  Skin: Denies pigmentation, dark lesions, and rashes   HEENT: Denies hearing loss, tinnitus, ear drainage, epistaxis, sore throat, and hoarseness. Cardiovascular: Denies palpitations, chest pain, and chest pressure. Respiratory: + cough, dyspnea at rest, hemoptysis, apnea, and choking.   Gastrointestinal: Denies nausea, vomiting, poor appetite, diarrhea, heartburn or reflux  Genitourinary: Denies dysuria, frequency, urgency or hematuria positive constipation  24-hour events:  None    Objective   Vitals: BP (!) 99/56   Pulse 93   Temp 97.6 °F (36.4 °C) (Temporal)   Resp 18   Ht 5' 3.5\" (1.613 m)   Wt 225 lb 3.2 oz (102.2 kg)   SpO2 97%   BMI 39.27 kg/m²     I/O:    Intake/Output Summary (Last 24 hours) at 5/24/2019 1437  Last data filed at 5/24/2019 1358  Gross per 24 hour   Intake 2581.12 ml   Output 1400 ml   Net 1181.12 ml                        CURRENT MEDS :  Scheduled Meds:   metoprolol succinate  25 mg Oral Daily    vancomycin  1,250 mg Intravenous Q24H    gabapentin  100 mg Oral TID    amLODIPine  2.5 mg Oral Daily    docusate sodium  100 mg Oral Daily    dorzolamide  1 drop Both Eyes BID    latanoprost  1 drop Both Eyes Nightly    triamterene-hydrochlorothiazide  1 tablet Oral Daily    albuterol  2.5 mg Nebulization 4x daily    sodium chloride flush  10 mL Intravenous 2 times per day    enoxaparin  40 mg Subcutaneous Daily    insulin lispro  0-6 Units Subcutaneous TID WC    insulin lispro  0-3 Units Subcutaneous Nightly    albuterol  2.5 mg Nebulization Q4H WA    cefepime  2 g Intravenous Q12H       Physical Exam:  General Appearance: appears comfortable in no acute distress. HEENT: Normocephalic atraumatic without obvious abnormality   Neck: Lips, mucosa, and tongue normal.  Supple, symmetrical, trachea midline, no adenopathy;thyroid:  no enlargement/tenderness/nodules or JVD. Lung: Breath sounds CTA no wheezing no rhonchi no crackles today. Respirations   unlabored. Symmetrical expansion. Heart: RRR, normal S1, S2. No MRG  Abdomen: Soft, NT, ND. BS present x 4 quadrants. No bruit or organomegaly. Extremities: Pedal pulses 2+ symmetric b/l. Extremities normal, no cyanosis, clubbing, or edema. Musculokeletal: No joint swelling, no muscle tenderness. ROM normal in all joints of extremities. Neurologic: Mental status: Alert and Oriented X3 .     Pertinent/ New Labs and Imaging Studies     Labs:  Lab Results   Component Value Date    WBC 16.3 05/23/2019    HGB 10.9 05/23/2019    HCT 34.3 05/23/2019    MCV 82.9 05/23/2019    MCH 26.3 05/23/2019    MCHC 31.8 05/23/2019    RDW 15.9 05/23/2019     05/23/2019    MPV 9.7 05/23/2019     Lab Results   Component Value Date     05/23/2019    K 3.7 05/23/2019    K 3.7 05/22/2019     05/23/2019    CO2 23 05/23/2019    BUN 23 05/23/2019    CREATININE 0.8 05/23/2019    LABALBU 3.0 05/20/2019    LABALBU 4.2 04/30/2012    CALCIUM 9.1 05/23/2019    GFRAA >60 05/23/2019    LABGLOM >60 05/23/2019     Lab Results   Component Value Date    PROTIME 12.2 02/18/2019    INR 1.1 02/18/2019     No results for input(s): PROBNP in the last 72 hours. No results for input(s): PROCAL in the last 72 hours. This SmartLink has not been configured with any valid records. Micro:  Recent Labs     05/23/19  1030   CULTRESP Oral Pharyngeal Lea present          Assessment:    1. Acute respiratory failure requiring 2 L nasal cannula  2. Pneumonia likely post obstructive due to tumor  3. Lung cancer NSCLC-currently untreated  4. Bacteremia staph epidermidis        Plan:     1. Antibiotics as per ID  2. Palliative care following    3. Continue bronchodilator therapy every 4 hours while awake  4. Continue  incentive spirometry and flutter valve  5. C/o constipation no BM in 5 days would like an enema reported this to nursing  6. Okay to discharge from a pulmonary standpoint when okay with others, she can follow-up in the office 2-4 weeks with her NP. Routed to office.      This plan of care was reviewed in collaboration with Dr. Malika Piper  Electronically signed by LIZBETH Fenton on 5/24/2019 at 2:37 PM    I personally saw, examined, and cared for the patient. Labs, medications, radiographs reviewed. I agree with history exam and plans detailed in NP note.     Electronically signed by Dia Goins MD on 5/24/2019 at 2:42 PM

## 2019-05-24 NOTE — PROGRESS NOTES
Notified Masood Lyman at ext #[phone] that the patient is a possible discharge and has orders for pt/ot.    Mary Ann Romano

## 2019-05-24 NOTE — CARE COORDINATION
Care Coordination:  Pt choiced for Hospice for information only. She chose [facility].   They are notified and will see her    Murleen Barefoot

## 2019-05-24 NOTE — PROGRESS NOTES
Subjective:  Feeling better No CP, SOB, F, V, D, P     Objective:    /61   Pulse 99   Temp 98.8 °F (37.1 °C) (Temporal)   Resp 18   Ht 5' 3.5\" (1.613 m)   Wt 225 lb 3.2 oz (102.2 kg)   SpO2 96%   BMI 39.27 kg/m²     24HR INTAKE/OUTPUT:      Intake/Output Summary (Last 24 hours) at 5/24/2019 0713  Last data filed at 5/24/2019 0556  Gross per 24 hour   Intake 3041.12 ml   Output 1300 ml   Net 1741.12 ml     nad  Heart:  RRR, no murmurs, gallops, or rubs. Lungs:  CTA bilaterally, no wheeze, rales or rhonchi  Abd: bowel sounds present, nontender, nondistended, no masses  Extrem:  No clubbing, cyanosis, or edema    Most Recent Labs  Lab Results   Component Value Date    WBC 16.3 (H) 05/23/2019    HGB 10.9 (L) 05/23/2019    HCT 34.3 05/23/2019     05/23/2019     05/23/2019    K 3.7 05/23/2019     05/23/2019    CREATININE 0.8 05/23/2019    BUN 23 05/23/2019    CO2 23 05/23/2019    GLUCOSE 104 (H) 05/23/2019    ALT 21 05/20/2019    AST 49 (H) 05/20/2019    INR 1.1 02/18/2019    TSH 2.090 06/07/2014    LABA1C 6.3 (H) 11/22/2018     No results for input(s): MG in the last 72 hours. Lab Results   Component Value Date    CALCIUM 9.1 05/23/2019    PHOS 3.4 02/19/2019        XR CHEST PORTABLE   Final Result   ALERT:  THIS IS AN ABNORMAL REPORT   1. Near complete opacification of the left hemithorax, findings   continue to remain suspicious for a mass/malignancy involving the left   hilar/left midlung and left infrahilar region, please see CT chest   3/15/2019. Suspected underlying left pleural effusion and associated   airspace disease   2. Right basilar airspace disease, findings can be seen in atelectasis   and/or developing infiltrate/pneumonia.           Assessment    Principal Problem:    Pneumonia due to organism  Active Problems:    HTN (hypertension), benign    Diabetes mellitus type 2, controlled ([address]    Morbid obesity ([facility]    Lung cancer ([facility]    Goals of care, counseling/discussion Cancer associated pain    Palliative care encounter  Resolved Problems:    * No resolved hospital problems.  *      Plan:    Admit  Nebulizers  IV ABX-coverage for resistant gram negatives and MRSA  proCalcitonin  0.49, repeat  Sputum culture  Blood culture 2/2 +CNS  ID cs  Urine legionella and strep Ag  Medications for other co morbidities cont as appropriate w dosage adjustments as necessary  PT/OT  DVT PPx  DC planning-  ?24- 48 hrs        Electronically signed by Antonio Cannon MD on 5/24/2019 at 7:13 AM

## 2019-05-24 NOTE — PROGRESS NOTES
concordant with her current medications. Rx for Oxycodone  5 mg Q6H PRN pain was written for discharge for 12 days. The Rx was written because there are no appointments available at the Palliative Care clinic next week (due to holiday weekend). Rx for 12 days was necessary for this reason. Patient to follow up with [address] [place] [address] [place] in two weeks. Time/Communication  Time In: 0900  Time Out: 0935    Greater than 51% of time spent, total 35 minutes in counseling and coordination of care at the bedside regardinggoals of care, symptom management, diagnosis and prognosis and see above. Discharge planning: follow up palliative care clinic on discharge. Referrals to: palliative care clinic    Discussed patient and the plan of care with the other IDT members of Palliative Care, and with , patient and floor nurse. Symptom Management:    Symptom Medications Number Doses in Past 24 hrs   Pain MS IR 7.5 mg Q4H PRN - d/enrique  Oxycodone 5 mg Q4H PRN moderate to severe pain. Nausea/vomiting     Bowel Regime/constipation Senna-S 2 now as well as Miralax. If no results after one hour, Mineral Oil enema ordered. Anxiety/agitation/depression     SOB     Appetite       Current Medications:  Inpatient medications reviewed: yes. Home Medications reviewed:  yes. Subjective:   Hospital days prior to  consult: Hospital Day: 5    Subjective/Events  The patient stated that her cough is increased since admission. She also states that she has been constipated for the last 5 days and feels uncomfortable. The patient denies headache, blurred vision, ear pain, sore throat, neck pain, abdominal pain, nausea, vomiting, diarrhea, blood in stool or black tarry stools, skin rashes, or extremity weakness. She stated the MSIR does not work very well for her, and that it makes her \"loopy\". Given that she does not want any further treatments, she is willing to talk to hospice as an informational session only.     Goals of care: provide comfort care/support/palliation/relieve suffering, remain at home, preserve independence/autonomy/control and continue current management  Advance Directives: DNR-CCA  Surrogate:Spouse  Prognosis: unknown  Spiritual assessment: community-based clergy involved  Bereavement and grief: to be determined. Past Medical History:   Diagnosis Date    Arthritis     Diabetes mellitus type 2, controlled ([address] 11/23/2018    treats with diet and exercise    Full dentures     Gastric mass     GERD (gastroesophageal reflux disease)     Glaucoma     bilateral    Hiatal hernia     HTN (hypertension), benign 11/23/2018       Past Surgical History:   Procedure Laterality Date    APPENDECTOMY      CHOLECYSTECTOMY      COLONOSCOPY N/A 1/21/2019    COLONOSCOPY POLYPECTOMY SNARE/COLD BIOPSY performed by Camilla Lorenzo MD at [address] [place] [address], COLON, DIAGNOSTIC      GASTRECTOMY N/A 2/12/2019    EXPLORATORY LAPAROTOMY WITH GASTRIC MASS RESECTION AND POSSIBLE RECONSTRUCTION -- EPIDURAL performed by Camilla Lorenzo MD at [place] [address] GASTROPLASTY  1970's    for weight loss    HERNIA REPAIR  1980's    ventral done x 2    HYSTERECTOMY  1960    JOINT REPLACEMENT Right 2013    knee    OTHER SURGICAL HISTORY  11/2018    Thorencentesis    TUMOR REMOVAL Bilateral 02/12/2019    middle of stomach .     UPPER GASTROINTESTINAL ENDOSCOPY N/A 1/21/2019    EGD BIOPSY performed by Camilla Lorenzo MD at [facility] ENDOSCOPY       Family History   Problem Relation Age of Onset    Diabetes Mother     Stroke Mother        Unable to obtain family history due to N/A- family history available    Allergies   Allergen Reactions    Demerol Hcl [Meperidine] Shortness Of Breath and Palpitations    Antivert [Meclizine]      Slurred speech, slows mental process    Iv [Iodides] Nausea And Vomiting     Heart races       Review of Systems:   See palliative care ROS/ESAS below; see

## 2019-05-24 NOTE — DISCHARGE SUMMARY
Physician Discharge Summary     Patient ID:  Narda Mehta  [id number]  80 y.o.  1934    Admit date: 5/20/2019    Discharge date and time:  5/24/2019    Admission Diagnoses:   Patient Active Problem List   Diagnosis    Pleural effusion    HTN (hypertension), benign    Diabetes mellitus type 2, controlled ([address]    Constipation    Morbid obesity ([address]    Gastrointestinal stromal tumor (GIST) of stomach ([address]    Mass of stomach    Pneumonia due to organism    Lung cancer ([address]    Goals of care, counseling/discussion    Cancer associated pain    Palliative care encounter       Discharge Diagnoses: Principal Problem:    Pneumonia due to organism  Active Problems:    HTN (hypertension), benign    Diabetes mellitus type 2, controlled ([address]    Morbid obesity ([address]    Lung cancer ([facility]    Goals of care, counseling/discussion    Cancer associated pain    Palliative care encounter  Resolved Problems:    * No resolved hospital problems.  *      Consults: IP CONSULT TO HOSPITALIST  IP CONSULT TO SOCIAL WORK  IP CONSULT TO PULMONOLOGY  IP CONSULT TO PHARMACY  IP CONSULT TO INFECTIOUS DISEASES  IP CONSULT TO PALLIATIVE CARE  IP CONSULT TO HOSPICE  IP CONSULT TO HOME CARE NEEDS    Procedures: NA    Hospital Course: 80-year-old female history of lung cancer admitted with suspected pneumonia patient was treated and improved and stable for discharge    Discharge Exam:  See progress note from today    Condition:  Stable    Disposition: long term care facility    Patient Instructions:   Current Discharge Medication List      START taking these medications    Details   amoxicillin-clavulanate (AUGMENTIN) 875-125 MG per tablet Take 1 tablet by mouth 2 times daily for 10 days  Qty: 20 tablet, Refills: 0      doxycycline hyclate (VIBRA-TABS) 100 MG tablet Take 1 tablet by mouth 2 times daily for 10 days  Qty: 20 tablet, Refills: 0      albuterol (PROVENTIL) (2.5 MG/3ML) 0.083% nebulizer solution Take 3 mLs by nebulization every 4 hours as needed for Wheezing or Shortness of Breath  Qty: 120 each, Refills: 3      gabapentin (NEURONTIN) 100 MG capsule Take 1 capsule by mouth 3 times daily for 120 days. Qty: 90 capsule, Refills: 3      polyethylene glycol (GLYCOLAX) packet Take 17 g by mouth daily as needed for Constipation  Qty: 527 g, Refills: 1      sennosides-docusate sodium (SENOKOT-S) 8.6-50 MG tablet Take 2 tablets by mouth daily as needed for Constipation  Qty: 50 tablet, Refills: 0      Respiratory Therapy Supplies (FULL KIT NEBULIZER SET) MISC Use as directed with nebulized medication. Qty: 1 each, Refills: 0    Comments: Supply prescription to Pharmacy or [address] [place] [address] location of patient or coverage preference. Dispense full nebulizer kit - compressor, tubing, mouthpiece, mask, ancillary supplies - per requirements of ordered product, patient preference, or coverage allowances.          CONTINUE these medications which have NOT CHANGED    Details   Cyanocobalamin (VITAMIN B 12 PO) Take by mouth daily      docusate sodium (COLACE, DULCOLAX) 100 MG CAPS Take 100 mg by mouth daily      Cholecalciferol (VITAMIN D3 PO) Take by mouth daily LD 1/17/2019      Coenzyme Q10 (CO Q 10 PO) Take by mouth daily LD 1/7/2019      amLODIPine (NORVASC) 2.5 MG tablet Take 2.5 mg by mouth daily Instructed to take morning of scope with a sip of water      metoprolol succinate (TOPROL XL) 25 MG extended release tablet Take 25 mg by mouth daily Instructed to take morning of scope with a sip of water      latanoprost (XALATAN) 0.005 % ophthalmic solution Place 1 drop into both eyes nightly      dorzolamide (TRUSOPT) 2 % ophthalmic solution Place 1 drop into both eyes 2 times daily         STOP taking these medications       albuterol sulfate HFA (PROAIR HFA) 108 (90 Base) MCG/ACT inhaler Comments:   Reason for Stopping:         pantoprazole (PROTONIX) 40 MG tablet Comments:   Reason for Stopping: triamterene-hydrochlorothiazide (MAXZIDE-25) 37.5-25 MG per tablet Comments:   Reason for Stopping:             Activity: activity as tolerated  Diet: regular diet    Follow-up with PCP, pulmonology, oncology    Note that over 30 minutes was spent in preparing discharge papers, discussing discharge with patient, staff, consultants, medication review, arranging follow up, etc.    Signed:  Jesse Gongora MD  5/24/2019  3:37 PM

## 2019-05-24 NOTE — PROGRESS NOTES
Occupational Therapy  OCCUPATIONAL THERAPY TREATMENT SESSION    Date:2019  Patient Name: Mitchell Ruiz  MRN: [id number]  : 1934  Room: [address] [place] [address]-        Evaluating OT: Funmi Mendes OTR/L [id number]    AM-PAC Daily Activity Raw Score:     Recommended Adaptive Equipment: TBD     Diagnosis: Pneumonia    PMHX: recent dx Lung CA (declined chemo and radiation)   Precautions:  Falls, O2     Home Living: Pt lives with  and Dtr in a 2-story house with Ramp Entry. Bedroom on the 2nd floor - Flight w/ 1 HR. Full Bathrooms on 1st and 2nd floors   Bathroom setup:  Tub-Shower and [employer] - Both   Equipment owned:  [address], FWW, Extended tub Bench, Grab Bars, w/c   [employer] Family Assist:   and Dtr can assist PRN   Prior Level of Function:  Patient reports recently requiring increase assist with ADLs.   Using w/wlker for mobility       Pain Level: 9/10 Left side/abdomen radiating front to back - Notified RN        Functional Assessment:   Initial Eval Status  Date: 19 Tx Session   Date:   19 Short Term Goals  Treatment frequency: PRN   Feeding Independent  IND    Grooming SBA/set-up ,seated  SUP/Set up    Pt was able to complete simple tasks while seated in chair, required Set up for task, unable to ambulate to/stand at sink d/t pain Mod I    UB Dressing SBA/set-up  Min A/Set up    Able to thread UEs through garment, required assist to wrap garment around her back + set up while seated EOB Mod I    LB Dressing Mod A  Mod A/ Set up    Required Max A to don socks, SUP to don slip-on shoes seated EOB, Mod A to don pants seated/standing (simulated) Mod I    Bathing  NT    Toileting Assist with thorough hygiene  NT    Bed Mobility  Sitting EOB upon entry  NT  Seated EOB at start of session    Functional Transfers SBA  Sit-stand from bed, chair  Min A    Required Min A + VCs/pt ed for hand placement/good tech sit<>stand from EOB 2x, chair 2x, increased success w/ rocking Mod I    Functional

## 2019-05-24 NOTE — PROGRESS NOTES
EPIDURAL performed by Reymundo Ortiz MD at [facility] GASTROPLASTY  1970's    for weight loss    HERNIA REPAIR  1980's    ventral done x 2    HYSTERECTOMY  1960    JOINT REPLACEMENT Right 2013    knee    OTHER SURGICAL HISTORY  11/2018    Thorencentesis    TUMOR REMOVAL Bilateral 02/12/2019    middle of stomach .  UPPER GASTROINTESTINAL ENDOSCOPY N/A 1/21/2019    EGD BIOPSY performed by Reymundo Ortiz MD at [facility] ENDOSCOPY       Allergies:  Demerol hcl [meperidine]; Antivert [meclizine]; and Iv [iodides]    Family Goal: Continue current treatment    Meeting held with Patient    Discharge Plan:  Discharge Disposition; [facility] Name: N/A    Referral received. Chart reviewed. Visit made to unit. Met with patient at the bedside. This is an INFORMATIONAL ONLY VISIT. Explained hospice philosophy and no longer pursuing any further aggressive treatment. Focusing on comfort care only. Explained hospice benefit and reviewed all levels of care including the hospice house for short term symptom management. Once symptoms are managed, patient would transfer to a routine level of care either home or ECF with hospice. Discussed roles and frequency of skilled nursing, personal care team, SW,  and non medical volunteers. Patient stated she does not need hospice care. [facility] will heal her again. \" She thanked me for the information. Brochure given. Asked patient to please call [facility] if she has any questions or needs. [facility] plan:  1. This is an [facility] visit. 2. Patient does not want hospice care. 3. Please call [facility] with any questions at [phone].     Electronically signed by Sebastian Culp RN on 5/24/2019 at 11:24 AM

## 2019-05-29 PROBLEM — J18.9 PNEUMONIA: Status: ACTIVE | Noted: 2019-01-01

## 2019-05-29 PROBLEM — C79.9 METASTATIC DISEASE (HCC): Status: ACTIVE | Noted: 2019-01-01

## 2019-05-29 NOTE — PROGRESS NOTES
Name: Andre Mascorro  : 1934  MRN: [id number]    Date: 2019    Benefits of immediately proceeding with Radiology exam outweigh the risks and therefore the following is being waived:      [] Pregnancy test    [x] Protocol for Iodine allergy    [] MRI questionnaire    [] BUN/Creatinine        Slim Tellez DO

## 2019-05-29 NOTE — ED NOTES
Patient voided/ urine sent @ [phone]. Patient repositioned for comfort.      Ashley Carrillo RN  05/29/19 8759

## 2019-05-29 NOTE — ED PROVIDER NOTES
80-year-old female with a recent history of left-sided central located lung cancer diagnosed in April 2019 presenting with shortness of breath has been progressively worsening over the last few weeks. She was recently admitted for pneumonia and was discharged. She states she has not felt significantly better since that time. She also had some leg swelling in both her legs but denies any pain associated with this. She has no chest pain at this time. She does wear 2 L of oxygen at baseline but is increased at 3 L today and states she is unsure if that helped. Patient is not currently undergoing chemotherapy or other cancer treatment, she was told she was poor candidate for chemotherapy or resection. On any anticoagulation at this time. She has no prior history of DVT or PE. She also notes some diffuse abdominal pain and also notes that she had a abdominal mass resected in February of this year. Surgical pathology was reviewed and noted that this is a malignant tumor that was resected. The history is provided by the patient. Shortness of Breath   Severity:  Moderate  Onset quality:  Gradual  Duration:  2 weeks  Timing:  Constant  Progression:  Worsening  Chronicity:  Recurrent  Relieved by:  Nothing  Worsened by:  Nothing  Ineffective treatments:  Inhaler, oxygen and rest  Associated symptoms: abdominal pain    Associated symptoms: no chest pain, no cough, no ear pain, no fever, no headaches, no rash, no sore throat, no vomiting and no wheezing        Review of Systems   Constitutional: Negative for chills and fever. HENT: Negative for ear pain, sinus pressure and sore throat. Eyes: Negative for pain, discharge and redness. Respiratory: Positive for shortness of breath. Negative for cough and wheezing. Cardiovascular: Negative for chest pain. Gastrointestinal: Positive for abdominal pain. Negative for abdominal distention, diarrhea, nausea and vomiting.    Genitourinary: Negative for dysuria and frequency. Musculoskeletal: Negative for arthralgias and back pain. Skin: Negative for rash and wound. Neurological: Negative for weakness and headaches. Hematological: Negative for adenopathy. All other systems reviewed and are negative. Physical Exam   Constitutional: She is oriented to person, place, and time. She appears well-developed and well-nourished. HENT:   Head: Normocephalic and atraumatic. Eyes: Conjunctivae are normal.   Neck: Normal range of motion. Neck supple. Cardiovascular: Regular rhythm and normal heart sounds. No murmur heard. Tachycardia, regular rhythm   Pulmonary/Chest: Effort normal and breath sounds normal. No respiratory distress. She has no wheezes. She has no rales. Patient on 2L NC   Abdominal: Soft. Bowel sounds are normal. There is tenderness. There is no rebound and no guarding. Diffuse abdominal tenderness without rigidity, rebound, guarding. Large incisional scar are noted. Musculoskeletal: She exhibits edema. 1+ bilateral pitting edema of the lower extremities. Neurological: She is alert and oriented to person, place, and time. No cranial nerve deficit. Coordination normal.   Skin: Skin is warm and dry. Nursing note and vitals reviewed. Procedures    MDM    ED Course as of May 29 2325   Wed May 29, 2019   1625 Pain control ordered. [CS]   1800 Patient reassessed. She states her pain is better. Physical exam unchanged. Pulse improved after IV fluids. We'll continue to assess. CTA pending.    [CS]      ED Course User Index  [CS] Lito Roque, DO       --------------------------------------------- PAST HISTORY ---------------------------------------------  Past Medical History:  has a past medical history of Arthritis, Diabetes mellitus type 2, controlled ([address] Full dentures, Gastric mass, GERD (gastroesophageal reflux disease), Glaucoma, Hiatal hernia, and HTN (hypertension), benign.     Past Surgical History:  has a past surgical history that includes gastroplasty (1970's); other surgical history (11/2018); joint replacement (Right, 2013); Hysterectomy (1960); hernia repair (1980's); Appendectomy; Upper gastrointestinal endoscopy (N/A, 1/21/2019); Colonoscopy (N/A, 1/21/2019); Cholecystectomy; Endoscopy, colon, diagnostic; gastrectomy (N/A, 2/12/2019); and tumor removal (Bilateral, 02/12/2019). Social History:  reports that she has been smoking cigarettes. She has a 48.75 pack-year smoking history. She has never used smokeless tobacco. She reports that she drinks alcohol. She reports that she does not use drugs. Family History: family history includes Diabetes in her mother; Stroke in her mother. The patients home medications have been reviewed. Allergies: Demerol hcl [meperidine];  Antivert [meclizine]; and Iv [iodides]    -------------------------------------------------- RESULTS -------------------------------------------------    LABS:  Results for orders placed or performed during the hospital encounter of 05/29/19   Lactate, Sepsis   Result Value Ref Range    Lactic Acid, Sepsis 2.8 (H) 0.5 - 1.9 mmol/L   Urinalysis   Result Value Ref Range    Color, UA Yellow Straw/Yellow    Clarity, UA Clear Clear    Glucose, Ur Negative Negative mg/dL    Bilirubin Urine SMALL (A) Negative    Ketones, Urine Negative Negative mg/dL    Specific Gravity, UA 1.025 1.005 - 1.030    Blood, Urine Negative Negative    pH, UA 5.5 5.0 - 9.0    Protein, UA 30 (A) Negative mg/dL    Urobilinogen, Urine 1.0 <2.0 E.U./dL    Nitrite, Urine Negative Negative    Leukocyte Esterase, Urine Negative Negative   Protime-INR   Result Value Ref Range    Protime 15.0 (H) 9.3 - 12.4 sec    INR 1.3    APTT   Result Value Ref Range    aPTT 29.2 24.5 - 35.1 sec   Lipase   Result Value Ref Range    Lipase 9 (L) 13 - 60 U/L   Troponin   Result Value Ref Range    Troponin <0.01 0.00 - 0.03 ng/mL   Brain Natriuretic Peptide   Result Value Ref Range    Pro- (H) 0 - 450 pg/mL   SPECIMEN REJECTION   Result Value Ref Range    Rejected Test CMPX     Reason for Rejection see below    Comprehensive Metabolic Panel w/ Reflex to MG   Result Value Ref Range    Sodium 140 132 - 146 mmol/L    Potassium reflex Magnesium 3.8 3.5 - 5.0 mmol/L    Chloride 104 98 - 107 mmol/L    CO2 21 (L) 22 - 29 mmol/L    Anion Gap 15 7 - 16 mmol/L    Glucose 167 (H) 74 - 99 mg/dL    BUN 29 (H) 8 - 23 mg/dL    CREATININE 0.8 0.5 - 1.0 mg/dL    GFR Non-African American >60 >=60 mL/min/1.73    GFR African American >60     Calcium 9.3 8.6 - 10.2 mg/dL    Total Protein 6.1 (L) 6.4 - 8.3 g/dL    Alb 2.6 (L) 3.5 - 5.2 g/dL    Total Bilirubin 0.6 0.0 - 1.2 mg/dL    Alkaline Phosphatase 312 (H) 35 - 104 U/L    ALT 21 0 - 32 U/L    AST 44 (H) 0 - 31 U/L   Magnesium   Result Value Ref Range    Magnesium 1.9 1.6 - 2.6 mg/dL   Troponin   Result Value Ref Range    Troponin <0.01 0.00 - 0.03 ng/mL   CBC Auto Differential   Result Value Ref Range    WBC 20.6 (H) 4.5 - 11.5 E9/L    RBC 4.18 3.50 - 5.50 E12/L    Hemoglobin 11.2 (L) 11.5 - 15.5 g/dL    Hematocrit 33.7 (L) 34.0 - 48.0 %    MCV 80.6 80.0 - 99.9 fL    MCH 26.8 26.0 - 35.0 pg    MCHC 33.2 32.0 - 34.5 %    RDW 16.7 (H) 11.5 - 15.0 fL    Platelets 705 868 - 960 E9/L    MPV 10.1 7.0 - 12.0 fL    Neutrophils % 87.0 (H) 43.0 - 80.0 %    Lymphocytes % 1.7 (L) 20.0 - 42.0 %    Monocytes % 6.1 2.0 - 12.0 %    Eosinophils % 4.3 0.0 - 6.0 %    Basophils % 0.4 0.0 - 2.0 %    Neutrophils # 18.13 (H) 1.80 - 7.30 E9/L    Lymphocytes # 0.41 (L) 1.50 - 4.00 E9/L    Monocytes # 1.24 (H) 0.10 - 0.95 E9/L    Eosinophils # 0.89 (H) 0.05 - 0.50 E9/L    Basophils # 0.00 0.00 - 0.20 E9/L    Myelocytes Relative 0.9 0 - 0 %    Anisocytosis 1+     Polychromasia 1+     Poikilocytes 1+     [place] Cells 1+     Ovalocytes 1+    Microscopic Urinalysis   Result Value Ref Range    WBC, UA 2-5 0 - 5 /HPF    RBC, UA 0-1 0 - 2 /HPF    Epi Cells RARE /HPF    Bacteria, UA MANY (A) /HPF    Amorphous, UA MODERATE    EKG 12 Lead   Result Value Ref Range    Ventricular Rate 137 BPM    Atrial Rate 137 BPM    P-R Interval 138 ms    QRS Duration 76 ms    Q-T Interval 268 ms    QTc Calculation (Bazett) 404 ms    P Axis 38 degrees    R Axis -5 degrees    T Axis 137 degrees       RADIOLOGY:  CTA CHEST W CONTRAST   Final Result   1. Findings for progressive malignancy in the left midchest and in the   liver and in the right lung. 2. Diffuse infiltrative process seen in the left-sided pleural   spaces/left lung parenchyma with encasement of the bronchovascular   structures. Pattern of endobronchial obstruction of the left main   bronchus and major segmental branches. This can be additionally   correlate with the bronchoscopy. 3. Development of fairly widespread hematogenous dissemination of   metastatic disease to the right lung. 4. Rapid development of a large metastatic lesions in the liver since   the previous study of March 2019.      5. No acute pulmonary embolus identified although partial malignant   encasement of the left lung pulmonary arterial circulation is present. Increasing RV/LV ratio up to 1.25, indicating strain of the right   ventricle. ALERT:  ABNORMAL REPORT. XR CHEST 1 VW   Final Result      Subtotal opacification of the left hemithorax is once again seen. This   was shown to be a rind of pleural thickening with centrally located   mass and infiltrate on the recent CT. Multifocal small areas of consolidation within the right lung base are   once again seen without interval change. EKG:  This EKG is signed and interpreted by me.     Rate: 137  Rhythm: Sinus  Interpretation: sinus tachycardia  Comparison: stable as compared to patient's most recent EKG      ------------------------- NURSING NOTES AND VITALS REVIEWED ---------------------------  Date / Time Roomed:  5/29/2019  3:12 PM  ED Bed Assignment:  [id number]    The nursing notes within the ED encounter and vital signs as below have been reviewed. Patient Vitals for the past 24 hrs:   BP Temp Temp src Pulse Resp SpO2 Height Weight   05/29/19 2230 -- -- -- -- -- -- 5' 3\" (1.6 m) 230 lb (104.3 kg)   05/29/19 2013 113/69 98.6 °F (37 °C) -- 98 20 96 % -- --   05/29/19 1900 -- -- -- 96 29 -- -- --   05/29/19 1845 124/80 -- -- 101 22 97 % -- --   05/29/19 1730 -- -- -- 102 20 -- -- --   05/29/19 1514 105/75 98.4 °F (36.9 °C) Temporal 136 20 98 % -- --       Oxygen Saturation Interpretation: Improved after treatment    ------------------------------------------ PROGRESS NOTES ------------------------------------------  Re-evaluation(s):  Time: 2100  Patients symptoms show no change  Repeat physical examination is not changed    Counseling:  I have spoken with the patient and discussed todays results, in addition to providing specific details for the plan of care and counseling regarding the diagnosis and prognosis. Their questions are answered at this time and they are agreeable with the plan of admission.    --------------------------------- ADDITIONAL PROVIDER NOTES ---------------------------------  Consultations:  Spoke with Dr. Shay Johnson. Discussed case. They will admit the patient. This patient's ED course included: a personal history and physicial examination, re-evaluation prior to disposition, multiple bedside re-evaluations, IV medications, cardiac monitoring, continuous pulse oximetry and complex medical decision making and emergency management    This patient has remained hemodynamically stable during their ED course. Diagnosis:  1. Metastatic disease ([facility]        Disposition:  Patient's disposition: Admit to med/surg  Patient's condition is fair.         NOTE: This report was transcribed using voice recognition software. Every effort was made to ensure accuracy; however, inadvertent computerized transcription errors may be present.             Myriam Gilbert DO  Resident  05/29/19 [facility]

## 2019-05-30 NOTE — PROGRESS NOTES
every 24 hours, will change scheduled regimen to vancomycin 1500 mg IV every 24 hours.   · Trough at steady state   · Pharmacist will follow and monitor/adjust dosing as necessary      Omkar Gale PharmD, BCPS 5/30/2019 7:35 AM

## 2019-05-30 NOTE — CARE COORDINATION
5/30/2019 social work transition of care  Hospice consult noted in epic. Sw followed up with pt at bedside. Pt wanted to discuss with her dtr, Sw left Hospice provider list with pt. Sw will follow.   Electronically signed by DICK Galindo on 5/30/2019 at 3:11 PM

## 2019-05-30 NOTE — PROGRESS NOTES
Pharmacy Consultation Note  (Antibiotic Dosing and Monitoring)      Pharmacy is following for Vancomycin dosing. Allergies:  Demerol hcl [meperidine]; Antivert [meclizine]; and Iv [iodides]    80 y.o. female    Ht Readings from Last 1 Encounters:   05/29/19 5' 3\" (1.6 m)     Wt Readings from Last 1 Encounters:   05/29/19 230 lb (104.3 kg)       Recent Labs     05/29/19  1700   WBC 20.6*       Recent Labs     05/29/19  1630   BUN 29*   CREATININE 0.8       Estimated Creatinine Clearance: 60 mL/min (based on SCr of 0.8 mg/dL). No intake or output data in the 24 hours ending 05/30/19 0032    Cultures:  available culture and sensitivity results were reviewed in [facility]      Assessment:  Consulted by Dr. Jayshree Jorgensen to dose/monitor vancomycin- patient recently admitted for PNA, ID was following. Came into ED today with SOB. Hx of metastatic disease. Estimated CrCl = 60 mL/min  Goal trough level = 15-20 mcg/mL  Patient also receiving cefepime. Dose weight- 73.2 kg    Plan: Will initiate vancomycin at a dose of 1750 mg ONCE- ED load of 25 mg/kg was never given. Will reschedule initial dose for now. Will follow with 1250 mg Q24h. Monitor renal function   Clinical pharmacy will follow up and complete full consult note in the AM.       Thank you for your consult,  Libby Mandujano, PharmD 5/30/2019 12:37 AM

## 2019-05-30 NOTE — CONSULTS
Palliative Care Department    Palliative Care Consult      Chief Complaint: Gilda Mota is a 80 y.o. female withchief complaint of shortness of breath. Assessment/Plan        Active Hospital Problems    Diagnosis Date Noted    Metastatic disease ([facility] [C79.9] 05/29/2019    Pneumonia [J18.9] 05/29/2019       Palliative Care Goals of Care/Code Status:   - Patient known to the palliative care service. Introduced palliative medicine to the patient's daughter and  who were present in the room with the patient. Did receive permission from the patient to discuss her current medical issue with the daughter and the . I reviewed the CT scan images of the patient's chest and abdomen with the daughter and . They are aware of the extensive metastatic disease. The patient stated that she does not want any chemotherapy or radiation moving forward. She stated that she would like her medical condition to be optimized and get back home and just be made \"comfortable\". The patient's daughter is in agreement with this after reviewing the CT scan images with me. We will consult hospice today. The patient's daughter requested [address]. The patient is currently getting Percocet for pain and is controlling her symptoms. We will continue to follow her pain symptoms and adjust as needed. The patient does have oral thrush and will treat with nystatin swish and swallow. Time/Communication  Time In: 1140  Time Out: 1255    Greater than 51% of time spent, total 75 minutes in counseling and coordination of care at the bedside regardinggoals of care, symptom management, diagnosis and prognosis and see above. Discharge planning: consult hospice    Referrals to: hospice    Discussed patient and the plan of care with the other IDT members of Palliative Care, and with patient, family and floor nurse. Current Medications:  Inpatient medications reviewed: yes.   Home Medications reviewed: type 2, controlled ([facility] 11/23/2018    treats with diet and exercise    Full dentures     Gastric mass     GERD (gastroesophageal reflux disease)     Glaucoma     bilateral    Hiatal hernia     HTN (hypertension), benign 11/23/2018       Past Surgical History:   Procedure Laterality Date    APPENDECTOMY      CHOLECYSTECTOMY      COLONOSCOPY N/A 1/21/2019    COLONOSCOPY POLYPECTOMY SNARE/COLD BIOPSY performed by Zackary Kirkland MD at [address] [place] [address], COLON, DIAGNOSTIC      GASTRECTOMY N/A 2/12/2019    EXPLORATORY LAPAROTOMY WITH GASTRIC MASS RESECTION AND POSSIBLE RECONSTRUCTION -- EPIDURAL performed by Zackary Kirkland MD at [facility] GASTROASTY  1970's    for weight loss    HERNIA REPAIR  1980's    ventral done x 2    HYSTERECTOMY  1960    JOINT REPLACEMENT Right 2013    knee    OTHER SURGICAL HISTORY  11/2018    Thorencentesis    TUMOR REMOVAL Bilateral 02/12/2019    middle of stomach .  UPPER GASTROINTESTINAL ENDOSCOPY N/A 1/21/2019    EGD BIOPSY performed by Zackary Kirkland MD at [facility] ENDOSCOPY       Family History   Problem Relation Age of Onset    Diabetes Mother     Stroke Mother        Unable to obtain family history due to N/A- family history available    Allergies   Allergen Reactions    Demerol Hcl [Meperidine] Shortness Of Breath and Palpitations    Antivert [Meclizine]      Slurred speech, slows mental process    Iv [Iodides] Nausea And Vomiting     Heart races       Review of Systems:   See palliative care ROS/ESAS below; see HPI. All Review of Systems are Negative except as stated in the HPI. Social history:  [place] status: no  Marital status:   Living status: with family:  spouse   Work history: housewife.     Family Meeting:  Participants:Spouse, Child and patient  Family meeting was held to discuss:diagnosis, prognosis, treatment options, goals of care, prior expressed wishes, advancedcare planning, symptom management and discharge plan      Objective:     Physical Exam  /74   Pulse 80   Temp 97.7 °F (36.5 °C) (Temporal)   Resp 20   Ht 5' 3\" (1.6 m)   Wt 230 lb (104.3 kg)   SpO2 97%   BMI 40.74 kg/m²     Physical Exam   Constitutional: She is oriented to person, place, and time. Chronically ill appearing female. Mildly short of breath. HENT:   Head: Normocephalic and atraumatic. Right Ear: External ear normal.   Left Ear: External ear normal.   Nose: Nose normal.   Mouth/Throat: Oropharynx is clear and moist. No oropharyngeal exudate (oral thrush noted). Eyes: Pupils are equal, round, and reactive to light. Conjunctivae and EOM are normal. Right eye exhibits no discharge. Left eye exhibits no discharge. No scleral icterus. Neck: Normal range of motion. Neck supple. JVD present. No tracheal deviation present. No thyromegaly present. Cardiovascular: Normal rate, regular rhythm and intact distal pulses. Murmur (Gr II/VI LA noted) heard. Pulmonary/Chest: No stridor. She is in respiratory distress (mild). She exhibits tenderness (left lateral chest wall). Significant decrease in breath sounds in the left lung. Rales noted by basilar. Coarse breath sounds throughout both lung fields. Abdominal: Soft. Bowel sounds are normal. She exhibits no distension. There is tenderness (Epigastric). There is no rebound and no guarding. Musculoskeletal: Normal range of motion. She exhibits no edema (Mild nonpitting edema from mid-shins to both ankles.), tenderness or deformity. Lymphadenopathy:     She has no cervical adenopathy. Neurological: She is alert and oriented to person, place, and time. No cranial nerve deficit. Skin: Skin is warm and dry. Capillary refill takes less than 2 seconds. No rash noted. She is not diaphoretic. No erythema. Psychiatric: She has a normal mood and affect. Very pleasant. Vitals reviewed.        [place] Symptom Assessment Score  [place] Score    Pain Score 5   Tiredness Score 7   Nausea Score 0   Depression Score 3   Anxiety Score 0   Drowsiness Score 0   Anorexia Score (0= eating well, 10= not eating) 4   Wellbeing Score  (10= worst sense of well-being) 8   Constipation    0   DyspneaScore    (0= no shortness of breath) 4     Assessed by: patient and provider. FLACC Scale (For Pain Assessment of the Non-Verbal Patient)  N/A Patient is able to verbalize. Results/Verification of Data Review   Objective data reviewed: labs, images, records, medication use, vitals and chart. Data in Support of Terminal Illness: N/A Palliative Patient. Electronically signed by Yenny Valenzuela DO on 5/30/2019 at 12:48 PM    Thank you for allowing the Palliative Medicine team to participate in the care of Jeniffer Devries. Note: This report was completed using [employer] voiced recognition software. Every effort has been made to ensure accuracy; however, inadvertent computerized transcription errors may be present.

## 2019-05-30 NOTE — H&P
History and Physical                                                                                    Moraima Adler MD, FACP                   Patient Name: Mika Lopez                   Age:  80 y.o. Gender:   female    CC: SOB    HPI: PER ER:  80-year-old female with a recent history of left-sided central located lung cancer diagnosed in April 2019 presenting with shortness of breath has been progressively worsening over the last few weeks. She was recently admitted for pneumonia and was discharged. She states she has not felt significantly better since that time. She also had some leg swelling in both her legs but denies any pain associated with this. She has no chest pain at this time. She does wear 2 L of oxygen at baseline but is increased at 3 L today and states she is unsure if that helped. Patient is not currently undergoing chemotherapy or other cancer treatment, she was told she was poor candidate for chemotherapy or resection. On any anticoagulation at this time. She has no prior history of DVT or PE. She also notes some diffuse abdominal pain and also notes that she had a abdominal mass resected in February of this year. Surgical pathology was reviewed and noted that this is a malignant tumor that was resected.     She was interviewed and examined this AM and endorses the above history  She states she feels more comfortable today but remains SOB  She is not in any distress  She states she is aware that she is not a candidate for any treatment for her cancer          Past Medical History:   Diagnosis Date    Arthritis     Diabetes mellitus type 2, controlled ([facility] [address] 11/23/2018    treats with diet and exercise    Full dentures     Gastric mass     GERD (gastroesophageal reflux disease)     Glaucoma     bilateral    Hiatal hernia     HTN (hypertension), benign 11/23/2018       Past Surgical History:   Procedure water   Yes Historical Provider, MD   latanoprost (XALATAN) 0.005 % ophthalmic solution Place 1 drop into both eyes nightly   Yes Historical Provider, MD   dorzolamide (TRUSOPT) 2 % ophthalmic solution Place 1 drop into both eyes 2 times daily   Yes Historical Provider, MD        Social History     Socioeconomic History    Marital status:      Spouse name: None    Number of children: None    Years of education: None    Highest education level: None   Occupational History    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   Social Needs    Financial resource strain: None    Food insecurity:     Worry: None     Inability: None    Transportation needs:     Medical: None     Non-medical: None   Tobacco Use    Smoking status: Current Every Day Smoker     Packs/day: 0.75     Years: 65.00     Pack years: 48.75     Types: Cigarettes    Smokeless tobacco: Never Used    Tobacco comment: 6cpd   Substance and Sexual Activity    Alcohol use: Yes     Comment: rare    Drug use: No    Sexual activity: None   Lifestyle    Physical activity:     Days per week: None     Minutes per session: None    Stress: None   Relationships    Social connections:     Talks on phone: None     Gets together: None     Attends Restorationist service: None     Active member of club or organization: None     Attends meetings of clubs or organizations: None     Relationship status: None    Intimate partner violence:     Fear of current or ex partner: None     Emotionally abused: None     Physically abused: None     Forced sexual activity: None   Other Topics Concern    None   Social History Narrative    None       Allergies   Allergen Reactions    Demerol Hcl [Meperidine] Shortness Of Breath and Palpitations    Antivert [Meclizine]      Slurred speech, slows mental process    Iv [Iodides] Nausea And Vomiting     Heart races       The patient's medical records have been reviewed.         Review of Systems:   · General: she has weakness sob but no fever-chills  · Eyes: No visual changes or diplopia. No swelling or pain. · ENT: No Headaches, tinnitus or vertigo. No mouth sores or sore throat. · Cardiovascular: No chest pain, dyspnea on exertion, palpitations, syncope. · Respiratory: No cough or wheezing, hemoptysis, sob, pleuritic pain. · Gastrointestinal: No abdominal pain, anorexia, hematochezia, melena, hematemesis or change in bowels. · Genitourinary: No dysuria, trouble voiding, or hematuria. No change in urination. · Musculoskeletal:  No joint pain or inflammation. No limb weakness. · Integumentary: No rash or pruritis. No abnormal pigmentation,  masses, hair or nail changes  · Neurological: No unusual headaches, weakness, numbness or tingling. No change in gait, balance, coordination, memory, mentation, behavior. · Psychiatric: No anxiety, or depression. Mood and affect reported as normal  · Endocrine: No temperature intolerance. No polydipsia or polyuria. · Hematologic: No abnormal bruising or bleeding, blood clots or swollen lymph nodes. no anemia, abnormal bleeding/bruising, fever,chills, night sweats, swollen glands. · Allergic/Immunologic: No nasal congestion or hives. Physical Examination:      Wt Readings from Last 3 Encounters:   05/29/19 230 lb (104.3 kg)   05/23/19 225 lb 3.2 oz (102.2 kg)   03/29/19 228 lb (103.4 kg)     Temp Readings from Last 3 Encounters:   05/29/19 98.4 °F (36.9 °C) (Temporal)   05/24/19 98.4 °F (36.9 °C) (Oral)   03/29/19 97.7 °F (36.5 °C)     BP Readings from Last 3 Encounters:   05/29/19 126/62   05/24/19 109/61   03/29/19 123/75     Pulse Readings from Last 3 Encounters:   05/29/19 96   05/24/19 100   03/29/19 93       General appearance: Normal, awake, alert no distress. Skin: Color, texture, turgor normal. No rashes or lesions. Head: Normocephalic.  No masses, lesions, tenderness or abnormalities   Face: Symmetric no visible lesions  Eyes: Conjunctivae/cornea clear. Velinda Oz. Sclera non icteric. Ears: External appearance normal.  Hearing grossly normal  Nose/Sinuses: Nares normal. No paranasal sinus tenderness. Mouth: Lips and tongue appear normal. Dentition noted  Neck:  Symmetric. No adenopathy. Chest: Even excursion-limited   Lungs: Harsh to auscultation. No rhonchi, crackles or rales. Heart: S1 > S2. Regular rate and rhythm. No gallop rub or murmur. Abdomen: Soft, mildly protuberant, mildly-tender. BS normal.  Extremities: No deformities, mile stasis edema, no skin discoloration. Musculoskeletal: No unusual pain or swelling. Muscular strength reduced  Neuro:   · Cranial nerves grossly intact. · Motor: Strength grossly normal. No focal weakness. · Sensory: grossly normal to touch. · No cerebellar signs---Coordination intact. Mental status: Awake, alert, cognizant and interactive. Patient appears capable of directing self care   Mood: Normal and appropriate affect  Gait & balance: not assessed:     Labs     CBC:   Lab Results   Component Value Date    WBC 20.6 05/29/2019    RBC 4.18 05/29/2019    HGB 11.2 05/29/2019    HCT 33.7 05/29/2019     05/29/2019    MCV 80.6 05/29/2019     BMP:    Lab Results   Component Value Date     05/29/2019    K 3.8 05/29/2019     05/29/2019    CO2 21 05/29/2019    BUN 29 05/29/2019    CREATININE 0.8 05/29/2019    GLUCOSE 167 05/29/2019    GLUCOSE 74 04/30/2012    CALCIUM 9.3 05/29/2019     Hepatic Function Panel:    Lab Results   Component Value Date    ALKPHOS 312 05/29/2019    AST 44 05/29/2019    ALT 21 05/29/2019    PROT 6.1 05/29/2019    LABALBU 2.6 05/29/2019    LABALBU 4.2 04/30/2012    BILITOT 0.6 05/29/2019     Magnesium:    Lab Results   Component Value Date    MG 1.9 05/29/2019     Cardiac Enzymes:   Lab Results   Component Value Date    TROPONINI <0.01 05/29/2019    TROPONINI <0.01 05/29/2019    TROPONINI <0.01 05/20/2019     LDH:  No results found for: LDH  PT/INR: Lab Results   Component Value Date    PROTIME 15.0 05/29/2019    INR 1.3 05/29/2019     BNP: No results for input(s): BNP in the last 72 hours.    TSH:   Lab Results   Component Value Date    TSH 2.090 06/07/2014      Cardiac Injury Profile:   Recent Labs     05/29/19  1630   TROPONINI <0.01      Lipid Profile:   Lab Results   Component Value Date    TRIG 74 06/07/2014    HDL 43 06/07/2014    LDLCALC 94 06/07/2014    CHOL 152 06/07/2014      Hemoglobin A1C: No components found for: HGBA1C   U/A:   Lab Results   Component Value Date    LEUKOCYTESUR Negative 05/29/2019    PHUR 5.5 05/29/2019    WBCUA 2-5 05/29/2019    RBCUA 0-1 05/29/2019    BACTERIA MANY 05/29/2019    SPECGRAV 1.025 05/29/2019    BLOODU Negative 05/29/2019    GLUCOSEU Negative 05/29/2019         ADMISSION SCHEDULED MEDS:   Current Facility-Administered Medications   Medication Dose Route Frequency Provider Last Rate Last Dose    [START ON 5/31/2019] vancomycin 1.5 g in dextrose 5% 300 mL IVPB  1,500 mg Intravenous Q24H Greg Renteria MD        acetaminophen (TYLENOL) tablet 650 mg  650 mg Oral Q4H PRN Anibal Sarkar DO        dorzolamide (TRUSOPT) 2 % ophthalmic solution 1 drop  1 drop Both Eyes BID Greg Renteria MD        latanoprost (XALATAN) 0.005 % ophthalmic solution 1 drop  1 drop Both Eyes Nightly Greg Renteria MD        metoprolol succinate (TOPROL XL) extended release tablet 25 mg  25 mg Oral Daily Greg Renteria MD        sodium chloride flush 0.9 % injection 10 mL  10 mL Intravenous 2 times per day Greg Renteria MD        sodium chloride flush 0.9 % injection 10 mL  10 mL Intravenous PRN Greg Renteria MD        magnesium hydroxide (MILK OF MAGNESIA) 400 MG/5ML suspension 30 mL  30 mL Oral Daily PRN Greg Renteria MD        ondansetron [facility]) injection 4 mg  4 mg Intravenous Q6H PRN Greg Renteria MD        enoxaparin (LOVENOX) injection 40 mg  40 mg Subcutaneous Daily Greg Renteria MD  cefepime (MAXIPIME) 2 g IVPB extended (mini-bag)  2 g Intravenous Q8H Jennifer Jackson MD 12.5 mL/hr at 05/30/19 0424 2 g at 05/30/19 0424    methylPREDNISolone sodium (SOLU-MEDROL) injection 80 mg  80 mg Intravenous Daily Jennifer Jackson MD        ipratropium-albuterol (DUONEB) nebulizer solution 1 ampule  1 ampule Inhalation Q4H WA Jennifer Jackson MD   1 ampule at 05/30/19 0640    oxyCODONE-acetaminophen (PERCOCET) 5-325 MG per tablet 1 tablet  1 tablet Oral Q4H PRN Jennifer Jackson MD   1 tablet at 05/30/19 0423       Current  Infusions      Prn Meds  acetaminophen, sodium chloride flush, magnesium hydroxide, ondansetron, oxyCODONE-acetaminophen    Radiology Review:  CTA CHEST W CONTRAST   Final Result   1. Findings for progressive malignancy in the left midchest and in the   liver and in the right lung. 2. Diffuse infiltrative process seen in the left-sided pleural   spaces/left lung parenchyma with encasement of the bronchovascular   structures. Pattern of endobronchial obstruction of the left main   bronchus and major segmental branches. This can be additionally   correlate with the bronchoscopy. 3. Development of fairly widespread hematogenous dissemination of   metastatic disease to the right lung. 4. Rapid development of a large metastatic lesions in the liver since   the previous study of March 2019.      5. No acute pulmonary embolus identified although partial malignant   encasement of the left lung pulmonary arterial circulation is present. Increasing RV/LV ratio up to 1.25, indicating strain of the right   ventricle. ALERT:  ABNORMAL REPORT. XR CHEST 1 VW   Final Result      Subtotal opacification of the left hemithorax is once again seen. This   was shown to be a rind of pleural thickening with centrally located   mass and infiltrate on the recent CT.        Multifocal small areas of consolidation within the right lung base are   once again seen without interval change.                   ASSESSMENT:  Patient Active Problem List   Diagnosis    Pleural effusion    HTN (hypertension), benign    Diabetes mellitus type 2, controlled ([facility] [address]    Constipation    Morbid obesity ([facility] [address]    Gastrointestinal stromal tumor (GIST) of stomach ([facility] [address]    Mass of stomach    Pneumonia due to organism    Lung cancer ([facility] [address]    Goals of care, counseling/discussion    Cancer associated pain    Palliative care encounter    Metastatic disease ([facility] [address]    Pneumonia       PLAN:  Supportive care at this time  Palliative care assist in transition to Hospice  Broadly  metastatic disease in rapid progression untreatable  Pulmonary service to maximize treatment  Broad antibiotic coverage  Cultures obtained      See  Orders  Tonya Bazan MD, Jackson Jenkins, American Board of Internal Medicine  Diplomate, American Board of Geriatric Medicine  8:01 AM  5/30/2019

## 2019-05-30 NOTE — CONSULTS
Mary Turpin M.D.,[facility]  Dayana Kruse D.O., F.CR.SANTIAGO.O.RONY., Melly Medrano M.D. Yesenia Gotit M.D., Dianelys Linda M.D. Patient:  Brooklynn Polanco 80 y.o. female MRN: [id number]     Date of Service: 5/30/2019      PULMONARY CONSULTATION    Reason for Consultation: metastatic disease, pneumonia  Referring Physician: Dr. Ana Moeller with the referring physician will be sent via the electronic medical record. Chief Complaint: shortness of breath    CODE STATUS: DNRCCA    SUBJECTIVE:  HPI:  Brooklynn Polanco is a 80 y.o. female who presented to the ED for shortness of breath. She was recently discharged after treatment for pneumonia and reports not feeling better. She has a history of left sided central lung cancer diagnosed in April of this year and has been more dyspneic progressively since that time. She wears 2L oxygen at home. She declined radiation, and was told she is a poor candidate for chemo or surgery. She had an abdominal mass resected in February of this year. The tumor proved to be malignant. CT on this admission shows the left lung to be nearly completely overtaken by tumor with mets in the right lung and to the liver as well. Today, she is lying in bed in no apparent distress. She is currently on 4L and she says her pain is fairly well managed. She does have a productive cough with white to yellow sputum.  at bedside.      Past Medical History:   Diagnosis Date    Arthritis     Diabetes mellitus type 2, controlled ([address] 11/23/2018    treats with diet and exercise    Full dentures     Gastric mass     GERD (gastroesophageal reflux disease)     Glaucoma     bilateral    Hiatal hernia     HTN (hypertension), benign 11/23/2018       Past Surgical History:   Procedure Laterality Date    APPENDECTOMY      CHOLECYSTECTOMY      COLONOSCOPY N/A 1/21/2019    COLONOSCOPY POLYPECTOMY SNARE/COLD BIOPSY performed by Dior Vargas MD at [facility] ENDOSCOPY    ENDOSCOPY, COLON, DIAGNOSTIC      GASTRECTOMY N/A 2/12/2019    EXPLORATORY LAPAROTOMY WITH GASTRIC MASS RESECTION AND POSSIBLE RECONSTRUCTION -- [facility]    for [facility]    HERNIA REPAIR  1980's    ventral done x 2    HYSTERECTOMY  1960    JOINT REPLACEMENT Right 2013    knee    OTHER SURGICAL HISTORY  11/2018    Thorencentesis    TUMOR REMOVAL Bilateral 02/12/2019    middle of stomach .    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   Social Needs    Financial resource strain: Not on file    Food insecurity:     Worry: Not on file     Inability: Not on file    Transportation needs:     Medical: Not on file     Non-medical: Not on file   Tobacco Use    Smoking status: Current Every Day Smoker     Packs/day: 0.75     Years: 65.00     Pack years: 48.75     Types: Cigarettes    Smokeless tobacco: Never Used    Tobacco comment: 6cpd   Substance and Sexual Activity    Alcohol use: Yes     Comment: rare    Drug use: No    Sexual activity: Not on file   Lifestyle    Physical activity:     Days per week: Not on file     Minutes per session: Not on file    Stress: Not on file   Relationships    Social connections:     Talks on phone: Not on file     Gets together: Not on file     Attends Congregation service: Not on file     Active member of club or organization: Not on file     Attends meetings of clubs or organizations: Not on file solution, Place 1 drop into both eyes nightly  dorzolamide (TRUSOPT) 2 % ophthalmic solution, Place 1 drop into both eyes 2 times daily    CURRENT MEDS :  Scheduled Meds:   [START ON 5/31/2019] vancomycin  1,500 mg Intravenous Q24H    nystatin  5 mL Oral 4x Daily    dorzolamide  1 drop Both Eyes BID    latanoprost  1 drop Both Eyes Nightly    metoprolol succinate  25 mg Oral Daily    sodium chloride flush  10 mL Intravenous 2 times per day    enoxaparin  40 mg Subcutaneous Daily    cefepime  2 g Intravenous Q8H    methylPREDNISolone  80 mg Intravenous Daily    ipratropium-albuterol  1 ampule Inhalation Q4H WA       Continuous Infusions: Allergies   Allergen Reactions    Demerol Hcl [Meperidine] Shortness Of Breath and Palpitations    Antivert [Meclizine]      Slurred speech, slows mental process    Iv [Iodides] Nausea And Vomiting     Heart races       REVIEW OF SYSTEMS:  Constitutional: Denies fever, weight loss, night sweats, and fatigue  Skin: Denies pigmentation, dark lesions, and rashes   HEENT: Denies hearing loss, tinnitus, ear drainage, epistaxis, sore throat, and hoarseness. Cardiovascular: Denies palpitations, chest pain, and chest pressure. Respiratory: Denies cough, dyspnea at rest, hemoptysis, apnea, and choking.   Gastrointestinal: Denies nausea, vomiting, poor appetite, diarrhea, heartburn or reflux  Genitourinary: Denies dysuria, frequency, urgency or hematuria  Musculoskeletal: Denies myalgias, muscle weakness, and bone pain  Neurological: Denies dizziness, vertigo, headache, and focal weakness  Psychological: Denies anxiety and depression  Endocrine: Denies heat intolerance and cold intolerance  Hematopoietic/Lymphatic: Denies bleeding problems and blood transfusions    OBJECTIVE:   /74   Pulse 80   Temp 97.7 °F (36.5 °C) (Temporal)   Resp 20   Ht 5' 3\" (1.6 m)   Wt 230 lb (104.3 kg)   SpO2 97%   BMI 40.74 kg/m²   SpO2 Readings from Last 1 Encounters:   05/30/19 97% I/O:    Intake/Output Summary (Last 24 hours) at 5/30/2019 1442  Last data filed at 5/30/2019 0530  Gross per 24 hour   Intake 0 ml   Output --   Net 0 ml       Physical Exam:  General: The patient is lying in bed comfortably without any distress. Breathing is not labored  HEENT: Pupils are equal round and reactive to light, there are no oral lesions and no post-nasal drip   Neck: supple without adenopathy  Cardiovascular: regular rate and rhythm without murmur or gallop  Respiratory: Clear to auscultation bilaterally without wheezing or crackles. Air entry is symmetric  Abdomen: soft, non-tender, non-distended, normal bowel sounds  Extremities: warm, no edema, no clubbing  Skin: no rash or lesion  Neurologic: CN II-XII grossly intact, no focal deficits    Pulmonary Function Testing personally reviewed and interpreted  None on file    Imaging personally reviewed:  5/29  . Findings for progressive malignancy in the left midchest and in the   liver and in the right lung.       2. Diffuse infiltrative process seen in the left-sided pleural   spaces/left lung parenchyma with encasement of the bronchovascular   structures. Pattern of endobronchial obstruction of the left main   bronchus and major segmental branches. This can be additionally   correlate with the bronchoscopy.       3. Development of fairly widespread hematogenous dissemination of   metastatic disease to the right lung.       4. Rapid development of a large metastatic lesions in the liver since   the previous study of March 2019.       5. No acute pulmonary embolus identified although partial malignant   encasement of the left lung pulmonary arterial circulation is present. Increasing RV/LV ratio up to 1.25, indicating strain of the right   ventricle.       ALERT:  ABNORMAL REPORT.       XR CHEST 1 VW   Final Result       Subtotal opacification of the left hemithorax is once again seen.  This   was shown to be a rind of pleural thickening with centrally located   mass and infiltrate on the recent CT.        Multifocal small areas of consolidation within the right lung base are   once again seen without interval change. Echo:  11/24/2018    Labs:  Lab Results   Component Value Date    WBC 20.6 05/29/2019    HGB 11.2 05/29/2019    HCT 33.7 05/29/2019    MCV 80.6 05/29/2019    MCH 26.8 05/29/2019    MCHC 33.2 05/29/2019    RDW 16.7 05/29/2019     05/29/2019    MPV 10.1 05/29/2019     Lab Results   Component Value Date     05/29/2019    K 3.8 05/29/2019     05/29/2019    CO2 21 05/29/2019    BUN 29 05/29/2019    CREATININE 0.8 05/29/2019    LABALBU 2.6 05/29/2019    LABALBU 4.2 04/30/2012    CALCIUM 9.3 05/29/2019    GFRAA >60 05/29/2019    LABGLOM >60 05/29/2019     Lab Results   Component Value Date    PROTIME 15.0 05/29/2019    INR 1.3 05/29/2019     Recent Labs     05/29/19  1530   PROBNP 515*     Recent Labs     05/29/19  1530 05/29/19  1630   TROPONINI <0.01 <0.01     Recent Labs     05/29/19  1630   PROCAL 1.07*     This SmartLink has not been configured with any valid records. Micro:  No results for input(s): CULTRESP in the last 72 hours. No results for input(s): LABGRAM in the last 72 hours. No results for input(s): LEGUR in the last 72 hours. No results for input(s): STREPNEUMAGU in the last 72 hours. No results for input(s): LP1UAG in the last 72 hours. Assessment:  1. Metastatic lung disease  2. Possible pneumonic process    Plan:  1. Supportive care with adequate pain management  2. Recommend Hospice, has been consulted  3. Continue antibiotics for pneumonia  4. Continue breathing treatments: Duonebs q4 hours while awake  5. Continue solu medrol      Thank you for allowing me to participate in the care of Jerry Keane. Please feel free to call with questions.      This plan of care was reviewed in collaboration with Dr. Vivi Epps    Electronically signed by LIZBETH King CNP on 5/30/2019 at 2:43 PM     I personally saw, examined, and cared for the patient. Labs, medications, radiographs reviewed. I agree with history exam and plans detailed in NP note. Widely metastatic lung CA. Patient has chosen against Chemo, wishes to be made comfortable. Breathing better after the above detailed interventions were made.  She and her  are considering Hospice  Inge Denver, D.O.

## 2019-05-31 NOTE — DISCHARGE INSTR - COC
Continuity of Care Form    Patient Name: Abdullahi Corona   :  1934  MRN:  [id number]    [facility] date:  2019  Discharge date:  19    Code Status Order: DNR-CCA   Advance Directives:   Advance Care Flowsheet Documentation     Date/Time Healthcare Directive Type of Healthcare Directive Copy in [address] Agent's Name Healthcare Agent's Phone Number    [phone]  No, patient does not have an advance directive for healthcare treatment  --  -- ordered last admission  --  --  --          Admitting Physician:  Fletcher Houston MD  PCP: Fe Ray MD    Discharging Nurse: [facility] Unit/Room#: 3295/1339-G  Discharging Unit Phone Number: [phone]    Emergency Contact:   Extended Emergency Contact Information  Primary Emergency Contact: Gurwinder Shelby  Address: [address] [facility] Phone: [phone]  Mobile Phone: [phone]  Relation: Spouse  Secondary Emergency Contact: Fanny Greene  Address: [address] [facility] [address] [facility] Phone: [phone]  Mobile Phone: [phone]  Relation: Child    Past Surgical History:  Past Surgical History:   Procedure Laterality Date    APPENDECTOMY      CHOLECYSTECTOMY      COLONOSCOPY N/A 2019    COLONOSCOPY POLYPECTOMY SNARE/COLD BIOPSY performed by Ken Barriga MD at [address] [place] [address] COLON, DIAGNOSTIC      GASTRECTOMY N/A 2019    EXPLORATORY LAPAROTOMY WITH GASTRIC MASS RESECTION AND POSSIBLE RECONSTRUCTION -- EPIDURAL performed by Ken Barriga MD at [facility] 22 GASTROPLASTY  's    for weight loss    HERNIA REPAIR  's    ventral done x 2   [address]    JOINT REPLACEMENT Right 2013    knee    OTHER SURGICAL HISTORY  2018    Thorencentesis    TUMOR REMOVAL Bilateral 2019    middle of stomach .     UPPER -     Safety Concerns:     None    Impairments/Disabilities:      None    Nutrition Therapy:  Current Nutrition Therapy:   - Oral Diet:  General    Routes of Feeding: Oral  Liquids: No Restrictions  Daily Fluid Restriction: no  Last Modified Barium Swallow with Video (Video Swallowing Test): not done    Treatments at the Time of Hospital Discharge:   Respiratory Treatments: ***  Oxygen Therapy:  is on oxygen at 3 L/min per nasal cannula. Ventilator:    - No ventilator support    Rehab Therapies: hospice  Weight Bearing Status/Restrictions: No weight bearing restirctions  Other Medical Equipment (for information only, NOT a DME order):  wheelchair  Other Treatments: ***    Patient's personal belongings (please select all that are sent with patient):  None    RN SIGNATURE:  Electronically signed by Raina Bergeron RN on 6/1/19 at 12:00 PM    CASE MANAGEMENT/SOCIAL WORK SECTION    Inpatient Status Date: ***    Readmission Risk Assessment Score:  Readmission Risk              Risk of Unplanned Readmission:        24           Discharging to Facility/ Agency   · Name: Lesly Mathias  · Address:  · Phone:  · Fax:    Dialysis Facility (if applicable)   · Name:  · Address:  · Dialysis Schedule:  · Phone:  · Fax:    / signature: Electronically signed by DICK Vann on 5/31/2019 at 3:13 PM      PHYSICIAN SECTION    Prognosis: {Prognosis:8552918963}    Condition at Discharge: [address] Patient Condition:470302379}    Rehab Potential (if transferring to Rehab): {Prognosis:6025267608}    Recommended Labs or Other Treatments After Discharge: ***    Physician Certification: I certify the above information and transfer of Jeniffer Devries  is necessary for the continuing treatment of the diagnosis listed and that she requires Hospice for *** 30 days.      Update Admission H&P: {P DME Changes in FXLBS:260935834}    PHYSICIAN SIGNATURE:  {Esignature:785715244}

## 2019-05-31 NOTE — CARE COORDINATION
5/31/2019 social work transition of care  Pt to discharge home with hospice. Hospice to set up transport at discharge.   Electronically signed by DICK Peñaloza on 5/31/2019 at 3:11 PM

## 2019-05-31 NOTE — PROGRESS NOTES
Tejal Hdz M.D.,[facility]  Abel Stewart D.O., F.A.C.OKanwalI., Karol Gregorio M.D. Danni Salazar M.D., [employer] [place] M.D. Daily Pulmonary Progress Note    Patient:  Jeniffer Devries 80 y.o. female MRN: [id number]     Date of Service: 5/31/2019      Synopsis     We are following patient for metastatic lung cancer    \"CC\" shortness of breath    Code status: DNR CCA      Subjective      Patient was seen and examined. Patient seen in room her significant other is at bedside, she is lying in bed in no acute distress, no labored breathing. She relayes to me that she was very anxious yesterday and has since been given some medication and she feels much better. We talked at length regarding her end-of-life decision for hospice. I relayed to patient my support of her plan for home with hospice. All questions answered. Review of Systems:  Constitutional: Denies fever, weight loss, night sweats, and fatigue  Skin: Denies pigmentation, dark lesions, and rashes   HEENT: Denies hearing loss, tinnitus, ear drainage, epistaxis, sore throat, and hoarseness. Cardiovascular: Denies palpitations, chest pain, and chest pressure. Respiratory: + cough,+ dyspnea at rest, hemoptysis, apnea, and choking.   Gastrointestinal: Denies nausea, vomiting, poor appetite, diarrhea, heartburn or reflux  Genitourinary: Denies dysuria, frequency, urgency or hematuria  Musculoskeletal: Denies myalgias, muscle weakness, and bone pain    24-hour events:  Hospice    Objective   Vitals: /77   Pulse 90   Temp 97.2 °F (36.2 °C) (Temporal)   Resp 18   Ht 5' 3\" (1.6 m)   Wt 230 lb (104.3 kg)   SpO2 98%   BMI 40.74 kg/m²     I/O:    Intake/Output Summary (Last 24 hours) at 5/31/2019 1357  Last data filed at 5/31/2019 0905  Gross per 24 hour   Intake 480 ml   Output --   Net 480 ml                        CURRENT MEDS :  Scheduled Meds:   busPIRone  10 mg Oral TID    sennosides-docusate sodium  2 tablet Oral Daily    vancomycin  1,500 mg Intravenous Q24H    nystatin  5 mL Oral 4x Daily    dorzolamide  1 drop Both Eyes BID    latanoprost  1 drop Both Eyes Nightly    metoprolol succinate  25 mg Oral Daily    sodium chloride flush  10 mL Intravenous 2 times per day    enoxaparin  40 mg Subcutaneous Daily    cefepime  2 g Intravenous Q8H    methylPREDNISolone  80 mg Intravenous Daily    ipratropium-albuterol  1 ampule Inhalation Q4H WA       Physical Exam:  General Appearance: appears comfortable in no acute distress. HEENT: Normocephalic atraumatic without obvious abnormality   Neck: Lips, mucosa, and tongue normal.  Supple, symmetrical, trachea midline, no adenopathy;thyroid:  no enlargement/tenderness/nodules or JVD. Lung: Breath sounds CTA, right scattered rhonchi no wheezing. Respirations   unlabored. Symmetrical expansion. Heart: RRR, normal S1, S2. No MRG  Abdomen: Soft, NT, ND. BS present x 4 quadrants. No bruit or organomegaly. Extremities: Pedal pulses 2+ symmetric b/l. Extremities normal, no cyanosis, clubbing, or edema. Musculokeletal: No joint swelling, no muscle tenderness. ROM normal in all joints of extremities. Neurologic: Mental status: Alert and Oriented X3 .     Pertinent/ New Labs and Imaging Studies       Labs:  Lab Results   Component Value Date    WBC 20.6 05/29/2019    HGB 11.2 05/29/2019    HCT 33.7 05/29/2019    MCV 80.6 05/29/2019    MCH 26.8 05/29/2019    MCHC 33.2 05/29/2019    RDW 16.7 05/29/2019     05/29/2019    MPV 10.1 05/29/2019     Lab Results   Component Value Date     05/29/2019    K 3.8 05/29/2019     05/29/2019    CO2 21 05/29/2019    BUN 29 05/29/2019    CREATININE 1.0 05/31/2019    LABALBU 2.6 05/29/2019    LABALBU 4.2 04/30/2012    CALCIUM 9.3 05/29/2019    GFRAA >60 05/31/2019    LABGLOM >60 05/31/2019     Lab Results   Component Value Date    PROTIME 15.0 05/29/2019    INR 1.3 05/29/2019     Recent Labs     05/29/19  1530   PROBNP 515* Recent Labs     05/29/19  1630   PROCAL 1.07*     This SmartLink has not been configured with any valid records. Assessment:      1. Metastatic lung disease  2. Possible pneumonic process          Plan:   1. Palliative medicine for symptomatic management  2. Patient is agreeable to Hospice, she tells me she would like to go home with hospice services  3. Continue antibiotics for pneumonia  4. Continue breathing treatments: Duonebs q4 hours while awake  5. Continue solu medrol           This plan of care was reviewed in collaboration with Dr. Fe Ball   Electronically signed by LIZBETH Mitchell on 5/31/2019 at 1:57 PM    I personally saw, examined, and cared for the patient. Labs, medications, radiographs reviewed. I agree with history exam and plans detailed in NP note.   Fe Ball D.O. Immediate family member

## 2019-05-31 NOTE — PROGRESS NOTES
(recent baseline of 0.7-0.9 from 3-5/2019)    Plan:  · Continue current regimen of vancomycin 1500 mg IV every 24 hours  · Trough at steady state   · Pharmacist will follow and monitor/adjust dosing as necessary      Odalis Penaloza PharmD, BCPS 5/31/2019 7:16 AM

## 2019-05-31 NOTE — PROGRESS NOTES
Ba Mann MD, [address]                   Patient Name: Kt Bearden                   Age:  80 y.o. Gender:   female    CC: SOB    HPI: PER ER:  69-year-old female with a recent history of left-sided central located lung cancer diagnosed in April 2019 presenting with shortness of breath has been progressively worsening over the last few weeks. She was recently admitted for pneumonia and was discharged. She states she has not felt significantly better since that time. She also had some leg swelling in both her legs but denies any pain associated with this. She has no chest pain at this time. She does wear 2 L of oxygen at baseline but is increased at 3 L today and states she is unsure if that helped. Patient is not currently undergoing chemotherapy or other cancer treatment, she was told she was poor candidate for chemotherapy or resection. On any anticoagulation at this time. She has no prior history of DVT or PE. She also notes some diffuse abdominal pain and also notes that she had a abdominal mass resected in February of this year. Surgical pathology was reviewed and noted that this is a malignant tumor that was resected.     She was interviewed and examined this AM and endorses the above history  She states she feels more comfortable today but remains SOB  She is not in any distress  She states she is aware that she is not a candidate for any treatment for her cancer    5/31:  She was evaluated by Pulmonary service and Palliative care  We discussed her options and she is agreeable to discuss Hospice House      Past Medical History:   Diagnosis Date    Arthritis     Diabetes mellitus type 2, controlled ([facility] [address] 11/23/2018    treats with diet and exercise    Full dentures     Gastric mass     GERD (gastroesophageal reflux disease)     Glaucoma     bilateral    Hiatal hernia     HTN (hypertension), benign 11/23/2018       Past Surgical History:   Procedure Laterality Date    APPENDECTOMY      CHOLECYSTECTOMY      COLONOSCOPY N/A 1/21/2019    COLONOSCOPY POLYPECTOMY SNARE/COLD BIOPSY performed by Taylor Parisi MD at [address] [place] [address], COLON, DIAGNOSTIC      GASTRECTOMY N/A 2/12/2019    EXPLORATORY LAPAROTOMY WITH GASTRIC MASS RESECTION AND POSSIBLE RECONSTRUCTION -- EPIDURAL performed by Taylor Parisi MD at [place] [address] GASTROPLASTY  1970's    for weight loss    HERNIA REPAIR  1980's    ventral done x 2    HYSTERECTOMY  1960    JOINT REPLACEMENT Right 2013    knee    OTHER SURGICAL HISTORY  11/2018    Thorencentesis    TUMOR REMOVAL Bilateral 02/12/2019    middle of stomach .  UPPER GASTROINTESTINAL ENDOSCOPY N/A 1/21/2019    EGD BIOPSY performed by Taylor Parisi MD at [facility]:   · General: she has weakness sob but no fever-chills  · Cardiovascular: No chest pain, dyspnea on exertion, palpitations, syncope. · Respiratory: she has some dyspnea  · Gastrointestinal: No abdominal pain, anorexia, hematochezia, melena, hematemesis or change in bowels. · Neurological: No unusual headaches, weakness, numbness or tingling. No change in gait, balance, coordination, memory, mentation, behavior. · Psychiatric: she is anxious, not depression.  Mood and affect reported as normal        Physical Examination:      Wt Readings from Last 3 Encounters:   05/29/19 230 lb (104.3 kg)   05/23/19 225 lb 3.2 oz (102.2 kg)   03/29/19 228 lb (103.4 kg)     Temp Readings from Last 3 Encounters:   05/31/19 97.2 °F (36.2 °C) (Temporal)   05/24/19 98.4 °F (36.9 °C) (Oral)   03/29/19 97.7 °F (36.5 °C)     BP Readings from Last 3 Encounters:   05/31/19 131/77   05/24/19 109/61   03/29/19 123/75     Pulse Readings from Last 3 Encounters:   05/31/19 90   05/24/19 100   03/29/19 93       General appearance: Normal, awake, alert no distress. Eyes: Conjunctivae/cornea clear. Lorenza Raider. Sclera non icteric. Arcus   Chest: Even excursion-limited   Lungs: Harsh to auscultation. No rhonchi, crackles or rales. Heart: S1 > S2. Regular rate and rhythm. No gallop rub or murmur. Abdomen: Soft, mildly protuberant, mildly-tender. BS normal.  Musculoskeletal: No unusual pain or swelling. Muscular strength reduced  Neuro:   · Cranial nerves grossly intact. · Motor: Strength grossly normal. No focal weakness. Mental status: Awake, alert, cognizant and interactive. Patient appears capable of directing self care   Mood: Normal and appropriate affect-admits to anxiety associated with the prospect of end of life   Gait & balance: not assessed:     Labs     CBC:   Lab Results   Component Value Date    WBC 20.6 05/29/2019    RBC 4.18 05/29/2019    HGB 11.2 05/29/2019    HCT 33.7 05/29/2019     05/29/2019    MCV 80.6 05/29/2019     BMP:    Lab Results   Component Value Date     05/29/2019    K 3.8 05/29/2019     05/29/2019    CO2 21 05/29/2019    BUN 29 05/29/2019    CREATININE 1.0 05/31/2019    GLUCOSE 167 05/29/2019    GLUCOSE 74 04/30/2012    CALCIUM 9.3 05/29/2019     Hepatic Function Panel:    Lab Results   Component Value Date    ALKPHOS 312 05/29/2019    AST 44 05/29/2019    ALT 21 05/29/2019    PROT 6.1 05/29/2019    LABALBU 2.6 05/29/2019    LABALBU 4.2 04/30/2012    BILITOT 0.6 05/29/2019     Magnesium:    Lab Results   Component Value Date    MG 1.9 05/29/2019     Cardiac Enzymes:   Lab Results   Component Value Date    TROPONINI <0.01 05/29/2019    TROPONINI <0.01 05/29/2019    TROPONINI <0.01 05/20/2019     LDH:  No results found for: LDH  PT/INR:    Lab Results   Component Value Date    PROTIME 15.0 05/29/2019    INR 1.3 05/29/2019     BNP: No results for input(s): BNP in the last 72 hours.    TSH:   Lab Results   Component Value Date    TSH 2.090 06/07/2014      Cardiac Injury Profile:   Recent Labs     05/29/19  1630   TROPONINI <0.01      Lipid Profile:   Lab Results   Component Value Date    TRIG 74 06/07/2014    HDL 43 06/07/2014    LDLCALC 94 06/07/2014    CHOL 152 06/07/2014      Hemoglobin A1C: No components found for: HGBA1C   U/A:   Lab Results   Component Value Date    LEUKOCYTESUR Negative 05/29/2019    PHUR 5.5 05/29/2019    WBCUA 2-5 05/29/2019    RBCUA 0-1 05/29/2019    BACTERIA MANY 05/29/2019    SPECGRAV 1.025 05/29/2019    BLOODU Negative 05/29/2019    GLUCOSEU Negative 05/29/2019         ADMISSION SCHEDULED MEDS:   Current Facility-Administered Medications   Medication Dose Route Frequency Provider Last Rate Last Dose    vancomycin 1.5 g in dextrose 5% 300 mL IVPB  1,500 mg Intravenous Q24H Gilbert Muñoz MD   Stopped at 05/31/19 0242    nystatin (MYCOSTATIN) 824646 UNIT/ML suspension 500,000 Units  5 mL Oral 4x Daily Lemond , DO   500,000 Units at 05/31/19 0857    acetaminophen (TYLENOL) tablet 650 mg  650 mg Oral Q4H PRN Marquis Byron DO        dorzolamide (TRUSOPT) 2 % ophthalmic solution 1 drop  1 drop Both Eyes BID Gilbert Muñoz MD   1 drop at 05/31/19 0858    latanoprost (XALATAN) 0.005 % ophthalmic solution 1 drop  1 drop Both Eyes Nightly Gilbert Muñoz MD   1 drop at 05/30/19 2046    metoprolol succinate (TOPROL XL) extended release tablet 25 mg  25 mg Oral Daily Gilbert Muñoz MD   25 mg at 05/31/19 0857    sodium chloride flush 0.9 % injection 10 mL  10 mL Intravenous 2 times per day Gilbert Muñoz MD   10 mL at 05/31/19 0859    sodium chloride flush 0.9 % injection 10 mL  10 mL Intravenous PRN Gilbert Muñoz MD   10 mL at 05/30/19 1208    magnesium hydroxide (MILK OF MAGNESIA) 400 MG/5ML suspension 30 mL  30 mL Oral Daily PRN Gilbert Muñoz MD        ondansetron [facility]) injection 4 mg  4 mg Intravenous Q6H PRN Gilbert Muñoz MD   4 mg at 05/31/19 0858    enoxaparin (LOVENOX) injection 40 mg  40 mg Subcutaneous Daily Dasia Mantilla MD   40 mg at 05/31/19 0859    cefepime (MAXIPIME) 2 g IVPB extended (mini-bag)  2 g Intravenous Q8H Dasia Mantilla MD   Stopped at 05/31/19 1012    methylPREDNISolone sodium (SOLU-MEDROL) injection 80 mg  80 mg Intravenous Daily Dasia Mantilla MD   80 mg at 05/31/19 0857    ipratropium-albuterol (DUONEB) nebulizer solution 1 ampule  1 ampule Inhalation Q4H WA Dasia Mantilla MD   1 ampule at 05/30/19 2021    oxyCODONE-acetaminophen (PERCOCET) 5-325 MG per tablet 1 tablet  1 tablet Oral Q4H PRN Dasia Mantilla MD   1 tablet at 05/31/19 1036       Current  Infusions      Prn Meds  acetaminophen, sodium chloride flush, magnesium hydroxide, ondansetron, oxyCODONE-acetaminophen    Radiology Review:  CTA CHEST W CONTRAST   Final Result   1. Findings for progressive malignancy in the left midchest and in the   liver and in the right lung. 2. Diffuse infiltrative process seen in the left-sided pleural   spaces/left lung parenchyma with encasement of the bronchovascular   structures. Pattern of endobronchial obstruction of the left main   bronchus and major segmental branches. This can be additionally   correlate with the bronchoscopy. 3. Development of fairly widespread hematogenous dissemination of   metastatic disease to the right lung. 4. Rapid development of a large metastatic lesions in the liver since   the previous study of March 2019.      5. No acute pulmonary embolus identified although partial malignant   encasement of the left lung pulmonary arterial circulation is present. Increasing RV/LV ratio up to 1.25, indicating strain of the right   ventricle. ALERT:  ABNORMAL REPORT. XR CHEST 1 VW   Final Result      Subtotal opacification of the left hemithorax is once again seen. This   was shown to be a rind of pleural thickening with centrally located   mass and infiltrate on the recent CT.        Multifocal small areas of consolidation within the right lung base are   once again seen without interval change.                   ASSESSMENT:  Patient Active Problem List   Diagnosis    Pleural effusion    HTN (hypertension), benign    Diabetes mellitus type 2, controlled ([address]    Constipation    Morbid obesity ([address]    Gastrointestinal stromal tumor (GIST) of stomach ([address]    Mass of stomach    Pneumonia due to organism    Lung cancer ([address]    Goals of care, counseling/discussion    Cancer associated pain    Palliative care encounter    Metastatic disease ([facility] [address]    Pneumonia       PLAN:  Supportive care at this time  WILL ADDRESS ANXIETY  Palliative care assist in transition to [facility] consult Hospice  Broadly  metastatic disease in rapid progression untreatable  Pulmonary service to maximize treatment  Broad antibiotic coverage  Cultures obtained      See  Orders  Verónica Villareal MD, FACP  Diplomate, American Board of Internal Medicine  Diplomate, [address] of Geriatric Medicine  10:56 AM  5/31/2019

## 2019-05-31 NOTE — PROGRESS NOTES
COLONOSCOPY POLYPECTOMY SNARE/COLD BIOPSY performed by Priscila Muñoz MD at [address] [place] [address] [place], COLON, DIAGNOSTIC      GASTRECTOMY N/A 2/12/2019    EXPLORATORY LAPAROTOMY WITH GASTRIC MASS RESECTION AND POSSIBLE RECONSTRUCTION -- EPIDURAL performed by Priscila Muñoz MD at [facility] GASTROASTY  1970's    for weight loss    HERNIA REPAIR  1980's    ventral done x 2    HYSTERECTOMY  1960    JOINT REPLACEMENT Right 2013    knee    OTHER SURGICAL HISTORY  11/2018    Thorencentesis    TUMOR REMOVAL Bilateral 02/12/2019    middle of stomach .  UPPER GASTROINTESTINAL ENDOSCOPY N/A 1/21/2019    EGD BIOPSY performed by Priscila Muñoz MD at [facility] ENDOSCOPY       Allergies:  Demerol hcl [meperidine]; Antivert [meclizine]; and Iv [iodides]    Family Goal: comfort    Meeting held with Nona over the phone  The hospice benefit and philosophy was explained to family. The following levels of care were discussed including, routine level of care at private home or facility, which hospice is not responsible for any room and board fees, and GIP level of care at the [facility] for short term symptom management. Once symptoms become managed, the patient would need to be moved to a lower level of care. Hospice House transition program also explained. Family informed that with the routine level of care,  the hospice team consists of the RN who visits 1-3 times a week, a  who visits within the first five days of the hospice election, the personal care team who visit 1-3 times a week, non-medical volunteers and Chaplains. [place] states they are in agreement with patient returning home with hospice. Only equipment need is oxygen. Patient has oxygen that [place] believes is thru [facility]. Spoke with Tyra Monte at [facility]- patient has oxygen and wheelchair, asked that these be changed to [address] starting today.   Dr. Malena Garber paged regarding

## 2019-06-01 NOTE — DISCHARGE SUMMARY
Discharge Summary    Caroline Badillo  :  1934  MRN:  [id number]    Admit date:  2019  Discharge date:  2019 10:29 AM    Admitting Physician:  Herb Hanson MD    Discharge Diagnoses:    Patient Active Problem List    Diagnosis Date Noted    Metastatic disease ([facility] 2019    Pneumonia 2019    Goals of care, counseling/discussion     Cancer associated pain     Palliative care encounter     Pneumonia due to organism 2019    Lung cancer ([facility] 2019    Mass of stomach 2019    Gastrointestinal stromal tumor (GIST) of stomach ([facility] 2019    HTN (hypertension), benign 2018    Diabetes mellitus type 2, controlled ([facility] 2018    Constipation 2018    Morbid obesity ([facility] 2018    Pleural effusion 2018       Past Medical Hx :   Past Medical History:   Diagnosis Date    Arthritis     Diabetes mellitus type 2, controlled ([facility] 2018    treats with diet and exercise    Full dentures     Gastric mass     GERD (gastroesophageal reflux disease)     Glaucoma     bilateral    Hiatal hernia     HTN (hypertension), benign 2018       Past Surgical Hx :   Past Surgical History:   Procedure Laterality Date    APPENDECTOMY      CHOLECYSTECTOMY      COLONOSCOPY N/A 2019    COLONOSCOPY POLYPECTOMY SNARE/COLD BIOPSY performed by Dewayne Daina MD at [address] [place] [address], DIAGNOSTIC      GASTRECTOMY N/A 2019    EXPLORATORY LAPAROTOMY WITH GASTRIC MASS RESECTION AND POSSIBLE RECONSTRUCTION -- EPIDURAL performed by Dewayne Diana MD at [facility] GASTROPLASTY  's    for weight loss    HERNIA REPAIR      ventral done x 2    HYSTERECTOMY  1960    JOINT REPLACEMENT Right 2013    knee    OTHER SURGICAL HISTORY  2018    Thorencentesis    TUMOR REMOVAL Bilateral 2019    middle of stomach .     UPPER GASTROINTESTINAL ENDOSCOPY N/A 2019    EGD BIOPSY noted that this is a malignant tumor that was resected TECHNIQUE: Single frontal projection of the chest (1 view). COMPARISON: May 20, 2019. 2019. CT chest 2019. FINDINGS: Subtotal opacification of the left hemithorax is once again seen. This was shown to be a rind of pleural thickening with centrally located mass and infiltrate on the recent CT. Multifocal small areas of consolidation within the right lung base are once again seen without interval change. The heart, lungs, mediastinum and regional skeleton are otherwise unremarkable. Subtotal opacification of the left hemithorax is once again seen. This was shown to be a rind of pleural thickening with centrally located mass and infiltrate on the recent CT. Multifocal small areas of consolidation within the right lung base are once again seen without interval change. Cta Chest W Contrast    Result Date: 2019  Patient MRN:  [id number] : 1934 Age: 80 years Gender: Female Order Date:  2019 3:45 PM TECHNIQUE/NUMBER OF IMAGES/COMPARISON/CLINICAL HISTORY: CTA of the chest After IV contrast administration axial images were obtained with sagittal and coronal MIP reconstructions of pulmonary arterial circulation thoracic aorta. History shortness of breast for a few weeks. Patient has history of hysterectomy, appendectomy, cholecystectomy, gastrectomy, diabetes. Comparison with previous this study is also , and March 15, 2019, also reviewed images of the lower lung bases from CT of abdomen and pelvis of 2018. Imagin IV contrast: 6 mL of Isovue-370. FINDINGS: There are signs for progressive malignancy in the left lung with the diffuse the peripheral encasement of the lung parenchyma with additional areas of bronchial encasement causing atelectasis throughout multiple segments of the left upper lobe and the left lower lobe.  These also causes vascular encasement of the arterial branches of the left pulmonary artery and encasement of the left superior and inferior pulmonary veins, particular of the left superior pulmonary vein. There are also encasement of the bronchial system and there is an endobronchial obstruction for the left main bronchus. Some degree of adenopathy is seen in the mediastinum as well. There is also some degree of adenopathy in the right hilar region which has been progressive since the FEMORAL and March 30 thousand 18. There is a no conspicuous of pulmonary embolus in the main PA, right and left main PAs in their lobar, segmental and subsegmental branches accounting the encasement of the left lung pulmonary arterial circulation by the malignant process affecting the left lung diffusely. There is no aneurysm formation dissection thoracic aorta. Cardiac has upper borderline size. No pericardial effusion is seen. Inner diameter for the left ventricular cavity is 3.5 cm and for the right ventricular cavity is 4.3 cm indicated that there is a increase in the RV/LV ratio up to 1.25 which can indicate a strain for the right ventricle. Diameter for the main pulmonary artery is 3.6 cm and for the ascending aorta is 3.9 cm. Additional areas of parenchymal opacities are seen in the right lung including nodular lesions which indicates metastatic disease. Several tiny multiple nodules are now present in the right lung. The liver is enlarged. The exophytic lesions are present in the liver particularly the left frontal lobe with hypoenhancement during arterial phase in this represents metastatic disease to the liver parenchyma. These is new development since the previous examination of March 2019. Additional hyperintense lesions are seen in the right lobe the liver of smaller size. Patient had previous surgery to the area of the stomach. 1 image is seen with MIP reconstructions demonstrate degenerative changes in the thoracic spine     1.  Findings for progressive malignancy in the left midchest and in the liver and in the right lung. 2. Diffuse infiltrative process seen in the left-sided pleural spaces/left lung parenchyma with encasement of the bronchovascular structures. Pattern of endobronchial obstruction of the left main bronchus and major segmental branches. This can be additionally correlate with the bronchoscopy. 3. Development of fairly widespread hematogenous dissemination of metastatic disease to the right lung. 4. Rapid development of a large metastatic lesions in the liver since the previous study of March 2019. 5. No acute pulmonary embolus identified although partial malignant encasement of the left lung pulmonary arterial circulation is present. Increasing RV/LV ratio up to 1.25, indicating strain of the right ventricle. ALERT:  ABNORMAL REPORT. Hospital Course:     66-year-old female with a recent history of left-sided central located lung cancer diagnosed in April 2019 presenting with shortness of breath has been progressively worsening over the last few weeks. She was recently admitted for pneumonia and was discharged. She states she has not felt significantly better since that time. She also had some leg swelling in both her legs but denies any pain associated with this. She has no chest pain at this time. She does wear 2 L of oxygen at baseline but is increased at 3 L today and states she is unsure if that helped. Patient is not currently undergoing chemotherapy or other cancer treatment, she was told she was poor candidate for chemotherapy or resection. On any anticoagulation at this time. She has no prior history of DVT or PE. She also notes some diffuse abdominal pain and also notes that she had a abdominal mass resected in February of this year.  Surgical pathology was reviewed and noted that this is a malignant tumor that was resected.     She was interviewed and examined this and endorses the above history  She states she feels more comfortable today but remains SOB  She is not in any distress  She states she is aware that she is not a candidate for any treatment for her cancer     5/31:  She was evaluated by Pulmonary service and Palliative care  We discussed her options and she is agreeable to discuss Hospice House    6/1:  She has been evaluated and transitioned to Hospice at Home  She is to be discharged on recommended Hospice medications for pain and anxiety at the end of life    Patient is aware of this transition and goals of treatment              Discharge Medications:    Svitlana Cox   Home Medication Instructions [id number]    Printed on:06/01/19 1022   Medication Information                      albuterol (PROVENTIL) (2.5 MG/3ML) 0.083% nebulizer solution  Take 3 mLs by nebulization every 4 hours as needed for Wheezing or Shortness of Breath             busPIRone (BUSPAR) 10 MG tablet  Take 1 tablet by mouth 3 times daily             dorzolamide (TRUSOPT) 2 % ophthalmic solution  Place 1 drop into both eyes 2 times daily             latanoprost (XALATAN) 0.005 % ophthalmic solution  Place 1 drop into both eyes nightly             LORazepam (ATIVAN) 0.5 MG tablet  Take 1 tablet by mouth every 6 hours as needed for Anxiety for up to 15 days. metoprolol succinate (TOPROL XL) 25 MG extended release tablet  Take 25 mg by mouth daily Instructed to take morning of scope with a sip of water             omeprazole (PRILOSEC) 20 MG delayed release capsule  Take 1 capsule by mouth daily             oxyCODONE-acetaminophen (PERCOCET) 5-325 MG per tablet  Take 1 tablet by mouth every 4 hours as needed for Pain for up to 7 days. Respiratory Therapy Supplies (FULL KIT NEBULIZER SET) MISC  Use as directed with nebulized medication.                  Discharge Exam:  General Appearance:    Alert, cooperative, no distress, appears stated age   Head:    Normocephalic, without obvious abnormality, atraumatic   Eyes:    PERRL, conjunctiva/corneas clear   Neck: Symmetrical, trachea midline, no adenopathy;     thyroid:  no enlargement/tenderness/nodules   Lungs:     Harsh to auscultation bilaterally, respirations unlabored   Chest Wall:    No tenderness or deformity    Heart:    Regular rate and rhythm, S1 and S2 normal, no murmur, rub   or gallop   Abdomen:     Soft, mildly-tender, bowel sounds active all four quadrants,     no masses, no organomegaly   Extremities:   Extremities normal, atraumatic, no cyanosis    Skin:   Skin color, texture, turgor normal, no rashes or lesions   Neurologic:   CNII-XII intact,      Disposition: home with Hospice          Patient Instructions:   Outlined pain mitigation and anxiety treatment  She admits to being quite comfortable at this time    Signed:  Bereket Melendez  [facility]  [employer]  6/1/2019, 10:29 AM

## 2019-06-01 NOTE — PROGRESS NOTES
CLINICAL PHARMACY NOTE: MEDS TO [address] Select Patient?: Yes  Total # of Prescriptions Filled: 2   The following medications were delivered to the patient:  · Oxycodone/acetamin 5-325  · Lorazepam 0.5   Total # of Interventions Completed: 3  Time Spent (min): 30    Additional Documentation:

## 2019-10-06 NOTE — PLAN OF CARE
Problem: Falls - Risk of:  Goal: Will remain free from falls  Description  Will remain free from falls  5/31/2019 0928 by Mayank Morgan RN  Outcome: Ongoing  5/31/2019 0247 by Russ Rodriguez RN  Outcome: Met This Shift  5/30/2019 1953 by Tere iKrby RN  Outcome: Ongoing
Problem: Falls - Risk of:  Goal: Will remain free from falls  Description  Will remain free from falls  Outcome: Ongoing
Pt will remain safe and stable
Viktoriya Matias
parents/older sibling

## 2021-02-04 NOTE — PROGRESS NOTES
CC- SOB    Subjective: The patient is awake and alert. Tolerating medications. Reports no side effects. Afebrile. On O2  10 ROS otherwise negative unless otherwise specified above. Objective:    Vitals:    05/24/19 0800   BP: (!) 99/56   Pulse: 93   Resp: 18   Temp: 97.6 °F (36.4 °C)   SpO2: 97%       General Appearance:    Awake, alert , no acute distress. HEENT:    Normocephalic,PERRL,neck supple, no JVD, mucosa moist, no thrush   Lungs:     L side diminished   Heart:    Regular rate and rhythm, no murmur   Abdomen:     Soft, non-tender, not distended  bowel sounds present,   Extremities:   No edema,no open wound,no erythema, non  tender   Skin:   no rashes or lesions   CBC+dif:  Reviewed and trend followed     Radiology:  Noted     Microbiology:  5/20-BC- 4 bottles with MRSE       Assessment:  · Sepsis L sided post obstructive pneumonia vs worsening malignancy with effusion  · CONS bacteremia- R knee TKA remote history. Does not look infected.    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
